# Patient Record
Sex: FEMALE | Race: BLACK OR AFRICAN AMERICAN | NOT HISPANIC OR LATINO | Employment: FULL TIME | URBAN - METROPOLITAN AREA
[De-identification: names, ages, dates, MRNs, and addresses within clinical notes are randomized per-mention and may not be internally consistent; named-entity substitution may affect disease eponyms.]

---

## 2017-05-23 ENCOUNTER — OFFICE VISIT (OUTPATIENT)
Dept: ORTHOPEDICS | Facility: CLINIC | Age: 62
End: 2017-05-23
Payer: COMMERCIAL

## 2017-05-23 ENCOUNTER — HOSPITAL ENCOUNTER (OUTPATIENT)
Dept: RADIOLOGY | Facility: HOSPITAL | Age: 62
Discharge: HOME | End: 2017-05-23
Attending: ORTHOPAEDIC SURGERY
Payer: COMMERCIAL

## 2017-05-23 ENCOUNTER — TELEPHONE (OUTPATIENT)
Dept: ORTHOPEDICS | Facility: CLINIC | Age: 62
End: 2017-05-23

## 2017-05-23 VITALS — HEIGHT: 66 IN | WEIGHT: 180.13 LBS | BODY MASS INDEX: 28.95 KG/M2

## 2017-05-23 DIAGNOSIS — G89.29 CHRONIC PAIN OF LEFT KNEE: Primary | ICD-10-CM

## 2017-05-23 DIAGNOSIS — M25.562 CHRONIC PAIN OF LEFT KNEE: ICD-10-CM

## 2017-05-23 DIAGNOSIS — G89.29 CHRONIC PAIN OF LEFT KNEE: ICD-10-CM

## 2017-05-23 DIAGNOSIS — M25.562 CHRONIC PAIN OF LEFT KNEE: Primary | ICD-10-CM

## 2017-05-23 DIAGNOSIS — M17.12 PRIMARY OSTEOARTHRITIS OF LEFT KNEE: ICD-10-CM

## 2017-05-23 PROCEDURE — 73560 X-RAY EXAM OF KNEE 1 OR 2: CPT | Mod: TC,RT

## 2017-05-23 PROCEDURE — 73562 X-RAY EXAM OF KNEE 3: CPT | Mod: 26,LT,, | Performed by: RADIOLOGY

## 2017-05-23 PROCEDURE — 99213 OFFICE O/P EST LOW 20 MIN: CPT | Mod: S$GLB,,, | Performed by: ORTHOPAEDIC SURGERY

## 2017-05-23 PROCEDURE — 1160F RVW MEDS BY RX/DR IN RCRD: CPT | Mod: S$GLB,,, | Performed by: ORTHOPAEDIC SURGERY

## 2017-05-23 PROCEDURE — 73560 X-RAY EXAM OF KNEE 1 OR 2: CPT | Mod: 26,RT,, | Performed by: RADIOLOGY

## 2017-05-23 PROCEDURE — 99999 PR PBB SHADOW E&M-NEW PATIENT-LVL II: CPT | Mod: PBBFAC,,, | Performed by: ORTHOPAEDIC SURGERY

## 2017-05-23 RX ORDER — AMOXICILLIN 500 MG
2 CAPSULE ORAL DAILY
COMMUNITY
End: 2023-01-09

## 2017-05-23 RX ORDER — ACETAMINOPHEN 500 MG
5000 TABLET ORAL DAILY
COMMUNITY
End: 2023-05-22

## 2017-05-23 NOTE — PROGRESS NOTES
"HPI:    Brittany Modi is a 61 y.o. female who is here today for   Chief Complaint   Patient presents with    Left Knee - Pain   .   Patient had a right total knee 12/21/15 and has done well with it.  Now starting to have left knee pain.  It's limiting her activity and she is using a cane.    Duration: 6 months  Intensity: severe  Character of pain: sharp  Location: She reports that the pain is predominately  medial    Past Medical History:   Diagnosis Date    Heart murmur           Current Outpatient Prescriptions:     cholecalciferol, vitamin D3, (VITAMIN D3) 5,000 unit Tab, Take 5,000 Units by mouth once daily., Disp: , Rfl:     fish oil-omega-3 fatty acids 300-1,000 mg capsule, Take 2 g by mouth once daily., Disp: , Rfl:     multivitamin capsule, Take 1 capsule by mouth once daily., Disp: , Rfl:      Review of patient's allergies indicates:  No Known Allergies     ROS  Constitutional: Negative for fever, Negative for weight loss  HENT Negative for congestion  Cardiovascular: Negative chest pain  Respiratory: Negative Shortness of breath  Heme: Negative excessive bleeding  Skin:NegativeItching, Negative breakdown  Musculoskeletal:Negative for back pain, Positive for joint pain, Negative muscle pain, Negative muscle weakness  Neurological: Negative for numbness and paresthesias   Psychiatric/Behavioral: Negative altered mental status, Negative for depression    Physical Exam:   Ht 5' 5.5" (1.664 m)   Wt 81.7 kg (180 lb 1.9 oz)   BMI 29.52 kg/m²   General appearance: This is a well-developed, Well nourished female No obvious acute distress.  Psychiatric: normal mood and affect;  pleasant and conversant; judgment and thought content normal  Gait is coordinated. Patient has antalgic gait to the left  Cardiovascular: There are no swelling or varicosities present.   Respiratory: normal respiratory effort   Lymphatic: no adenopathy   Neurologic: alert and oriented to person, place, and time   Deep Tendon Reflexes " are normal;  Coordination and Balance: Normal   Musculoskeletal   Neck    ROM shows normal flexion and extension and lateral rotation    Palpation: Non-tender    Stability is normal    Strength is normal    Skin is normal without masses and lesions    Sensation is intact to light touch   Back    ROM showsnormal flexion, extension    and  rotation    Palpation shows no masses    Stability is normal    Strength to flexion and extension well maintained    Core strength is diminished    Skin shows no rashes or cafe au lait spots;     Sensation is intact to light touch    Right Knee  Swelling None  TendernessNone  Range of Motion: 120    Left Knee: Swelling None  TendernessMedial joint line  Range of Motion: 120    Radiograph   Osteoarthritis: severe  Angle: mild varus    Assessment: The medial osteoarthritis is not changed drastically in the last year.    Plan:  At this point will follow it along no plans for intervention yet.

## 2017-12-26 ENCOUNTER — OFFICE VISIT (OUTPATIENT)
Dept: CARDIOLOGY | Facility: CLINIC | Age: 62
End: 2017-12-26
Payer: COMMERCIAL

## 2017-12-26 VITALS
HEIGHT: 65 IN | BODY MASS INDEX: 29.97 KG/M2 | DIASTOLIC BLOOD PRESSURE: 70 MMHG | WEIGHT: 179.88 LBS | SYSTOLIC BLOOD PRESSURE: 110 MMHG | HEART RATE: 84 BPM

## 2017-12-26 DIAGNOSIS — R00.2 PALPITATION: ICD-10-CM

## 2017-12-26 DIAGNOSIS — R07.2 PRECORDIAL PAIN: Primary | ICD-10-CM

## 2017-12-26 PROCEDURE — 93000 ELECTROCARDIOGRAM COMPLETE: CPT | Mod: S$GLB,,, | Performed by: INTERNAL MEDICINE

## 2017-12-26 PROCEDURE — 99214 OFFICE O/P EST MOD 30 MIN: CPT | Mod: S$GLB,,, | Performed by: INTERNAL MEDICINE

## 2017-12-26 PROCEDURE — 99999 PR PBB SHADOW E&M-EST. PATIENT-LVL III: CPT | Mod: PBBFAC,,, | Performed by: INTERNAL MEDICINE

## 2017-12-26 RX ORDER — NITROGLYCERIN 0.4 MG/1
0.4 TABLET SUBLINGUAL EVERY 5 MIN PRN
Qty: 60 TABLET | Refills: 1 | Status: SHIPPED | OUTPATIENT
Start: 2017-12-26 | End: 2018-01-23 | Stop reason: SDUPTHER

## 2017-12-26 NOTE — PROGRESS NOTES
Subjective:    Patient ID:  Brittany Modi is a 62 y.o. female who presents for evaluation of Chest Pain      HPI   The patient is a 62 year old female presents for evaluation of chest pain with an onset [id number]. The pain is discribed as pressure which can be severe which can be brought on with extreme exercise and elevated heart rate but occurs at resting a few minutes. She was evaluated in [facility] in 2015. The echo stress test revealed abnormal ST response but no wall motion abnormalities. A nuclear study was normal followed but a LHC which was normal. See media. She was prescribed amlodipine which was of no help. She was never give a trial of TNG. An EKG and Echo here in 2015 were normal. He is a non smoker, has no hypertension, diabetes of family history of CAD. Today's EKG reveals non specific T wave abnormalities. 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.  Lab Results   Component Value Date     12/22/2015    K 3.8 12/22/2015     12/22/2015    CO2 23 12/22/2015    BUN 13 12/22/2015    CREATININE 0.9 12/22/2015     (H) 12/22/2015    MG 1.9 12/22/2015    AST 30 12/15/2015    ALT 18 12/15/2015    ALBUMIN 4.2 12/15/2015    PROT 7.6 12/15/2015    BILITOT 0.4 12/15/2015    WBC 5.46 12/21/2015    HGB 10.7 (L) 12/22/2015    HCT 33.2 (L) 12/22/2015    MCV 98 12/21/2015     12/21/2015    INR 1.1 12/15/2015         No results found for: CHOL, HDL, TRIG    No results found for: LDLCALC    Past Medical History:   Diagnosis Date    Arthritis     GERD (gastroesophageal reflux disease)     Heart murmur        Current Outpatient Prescriptions:     cholecalciferol, vitamin D3, (VITAMIN D3) 5,000 unit Tab, Take 5,000 Units by mouth once daily., Disp: , Rfl:     fish oil-omega-3 fatty acids 300-1,000 mg capsule, Take 2 g by mouth once daily., Disp: , Rfl:     multivitamin capsule, Take 1 capsule by mouth once daily., Disp: , Rfl:     amlodipine (NORVASC) 5 MG tablet, Take 5 mg by  mouth every evening., Disp: , Rfl:     aspirin 325 MG tablet, Take 1 tablet (325 mg total) by mouth 2 (two) times daily., Disp: 60 tablet, Rfl: 0    hydrocodone-acetaminophen 5-325mg (NORCO) 5-325 mg per tablet, Take 1 tablet by mouth every 4 to 6 hours as needed for Pain., Disp: 90 tablet, Rfl: 0    metoprolol succinate (TOPROL-XL) 50 MG 24 hr tablet, Take 50 mg by mouth every evening., Disp: , Rfl:     ondansetron (ZOFRAN-ODT) 4 MG TbDL, Take 1 tablet (4 mg total) by mouth every 6 (six) hours as needed (nausea)., Disp: 60 tablet, Rfl: 0    tramadol (ULTRAM) 50 mg tablet, One or two tablets every 4 hours, prn pain, Disp: 60 tablet, Rfl: 0        Review of Systems   Constitution: Negative for decreased appetite, diaphoresis, fever, weakness, malaise/fatigue, weight gain and weight loss.   HENT: Negative for congestion, ear discharge, ear pain and nosebleeds.    Eyes: Negative for blurred vision, double vision and visual disturbance.   Cardiovascular: Positive for chest pain. Negative for claudication, cyanosis, dyspnea on exertion, irregular heartbeat, leg swelling, near-syncope, orthopnea, palpitations, paroxysmal nocturnal dyspnea and syncope.   Respiratory: Negative for cough, hemoptysis, shortness of breath, sleep disturbances due to breathing, snoring, sputum production and wheezing.    Endocrine: Negative for polydipsia, polyphagia and polyuria.   Hematologic/Lymphatic: Negative for adenopathy and bleeding problem. Does not bruise/bleed easily.   Skin: Negative for color change, nail changes, poor wound healing and rash.   Musculoskeletal: Negative for muscle cramps and muscle weakness.   Gastrointestinal: Negative for abdominal pain, anorexia, change in bowel habit, hematochezia, nausea and vomiting.   Genitourinary: Negative for dysuria, frequency and hematuria.   Neurological: Negative for brief paralysis, difficulty with concentration, excessive daytime sleepiness, dizziness, focal weakness,  "headaches, light-headedness, seizures and vertigo.   Psychiatric/Behavioral: Negative for altered mental status and depression.   Allergic/Immunologic: Negative for persistent infections.        Objective:/70   Pulse 84   Ht 5' 5" (1.651 m)   Wt 81.6 kg (179 lb 14.3 oz)   BMI 29.94 kg/m²           Physical Exam   Constitutional: She is oriented to person, place, and time. She appears well-developed and well-nourished.   HENT:   Head: Normocephalic.   Right Ear: External ear normal.   Left Ear: External ear normal.   Nose: Nose normal.   Inspection of lips, teeth and gums normal   Eyes: Conjunctivae and EOM are normal. Pupils are equal, round, and reactive to light. No scleral icterus.   Neck: Normal range of motion. No JVD present. No tracheal deviation present. No thyromegaly present.   Cardiovascular: Normal rate, regular rhythm, normal heart sounds and intact distal pulses.  Exam reveals no gallop and no friction rub.    No murmur heard.  Pulses:       Carotid pulses are 2+ on the right side, and 2+ on the left side.       Dorsalis pedis pulses are 2+ on the right side, and 2+ on the left side.        Posterior tibial pulses are 2+ on the right side, and 2+ on the left side.   Pulmonary/Chest: Effort normal and breath sounds normal. No respiratory distress. She has no wheezes. She has no rales. She exhibits no tenderness.   Abdominal: Soft. Bowel sounds are normal. She exhibits no distension. There is no hepatosplenomegaly. There is no tenderness. There is no guarding.   Musculoskeletal: Normal range of motion. She exhibits no edema or tenderness.   Lymphadenopathy:   Palpation of lymph nodes of neck and groin normal   Neurological: She is oriented to person, place, and time. No cranial nerve deficit. She exhibits normal muscle tone. Coordination normal.   Skin: Skin is warm and dry. No rash noted. No erythema. No pallor.   Palpation of skin normal   Psychiatric: She has a normal mood and affect. Her " behavior is normal. Judgment and thought content normal.         Assessment:       1. Precordial pain atypical        Plan:       Brittany was seen today for chest pain.    Diagnoses and all orders for this visit:    Precordial pain    PET ordered to evaluate for small vessel diease  Trial of TNG

## 2017-12-28 ENCOUNTER — TELEPHONE (OUTPATIENT)
Dept: CARDIOLOGY | Facility: CLINIC | Age: 62
End: 2017-12-28

## 2017-12-28 DIAGNOSIS — R00.2 PALPITATION: Primary | ICD-10-CM

## 2017-12-28 NOTE — TELEPHONE ENCOUNTER
----- Message from Geri Mckenna RN sent at 12/28/2017  1:20 PM CST -----  Regarding: FW: EKG ORDER      ----- Message -----  From: Chanel Arechiga  Sent: 12/28/2017  11:16 AM  To: [facility] Clinical Support Staff  Subject: EKG ORDER                                        Please put in EKG order, thanks. Chanel [phone]

## 2018-01-15 ENCOUNTER — PATIENT MESSAGE (OUTPATIENT)
Dept: CARDIOLOGY | Facility: CLINIC | Age: 63
End: 2018-01-15

## 2018-01-15 ENCOUNTER — TELEPHONE (OUTPATIENT)
Dept: CARDIOLOGY | Facility: CLINIC | Age: 63
End: 2018-01-15

## 2018-01-15 DIAGNOSIS — I25.10 CORONARY ARTERY DISEASE, ANGINA PRESENCE UNSPECIFIED, UNSPECIFIED VESSEL OR LESION TYPE, UNSPECIFIED WHETHER NATIVE OR TRANSPLANTED HEART: Primary | ICD-10-CM

## 2018-01-23 ENCOUNTER — PATIENT MESSAGE (OUTPATIENT)
Dept: CARDIOLOGY | Facility: CLINIC | Age: 63
End: 2018-01-23

## 2018-01-23 DIAGNOSIS — R07.2 PRECORDIAL PAIN: ICD-10-CM

## 2018-01-23 RX ORDER — NITROGLYCERIN 0.4 MG/1
0.4 TABLET SUBLINGUAL EVERY 5 MIN PRN
Qty: 25 TABLET | Refills: 3 | Status: SHIPPED | OUTPATIENT
Start: 2018-01-23 | End: 2019-07-17

## 2018-01-29 ENCOUNTER — LAB VISIT (OUTPATIENT)
Dept: LAB | Facility: HOSPITAL | Age: 63
End: 2018-01-29
Attending: OBSTETRICS & GYNECOLOGY
Payer: COMMERCIAL

## 2018-01-29 ENCOUNTER — TELEPHONE (OUTPATIENT)
Dept: OBSTETRICS AND GYNECOLOGY | Facility: CLINIC | Age: 63
End: 2018-01-29

## 2018-01-29 ENCOUNTER — OFFICE VISIT (OUTPATIENT)
Dept: OBSTETRICS AND GYNECOLOGY | Facility: CLINIC | Age: 63
End: 2018-01-29
Payer: COMMERCIAL

## 2018-01-29 VITALS
HEIGHT: 65 IN | SYSTOLIC BLOOD PRESSURE: 120 MMHG | WEIGHT: 179.88 LBS | BODY MASS INDEX: 29.97 KG/M2 | DIASTOLIC BLOOD PRESSURE: 80 MMHG

## 2018-01-29 DIAGNOSIS — Z01.419 WELL WOMAN EXAM WITH ROUTINE GYNECOLOGICAL EXAM: Primary | ICD-10-CM

## 2018-01-29 DIAGNOSIS — Z01.419 WELL WOMAN EXAM WITH ROUTINE GYNECOLOGICAL EXAM: ICD-10-CM

## 2018-01-29 DIAGNOSIS — Z78.0 MENOPAUSE: ICD-10-CM

## 2018-01-29 LAB
CHOLEST SERPL-MCNC: 168 MG/DL
CHOLEST/HDLC SERPL: 3.1 {RATIO}
ESTIMATED AVG GLUCOSE: 91 MG/DL
HBA1C MFR BLD HPLC: 4.8 %
HDLC SERPL-MCNC: 54 MG/DL
HDLC SERPL: 32.1 %
LDLC SERPL CALC-MCNC: 103.4 MG/DL
NONHDLC SERPL-MCNC: 114 MG/DL
TRIGL SERPL-MCNC: 53 MG/DL
TSH SERPL DL<=0.005 MIU/L-ACNC: 1.53 UIU/ML

## 2018-01-29 PROCEDURE — 83036 HEMOGLOBIN GLYCOSYLATED A1C: CPT

## 2018-01-29 PROCEDURE — 99386 PREV VISIT NEW AGE 40-64: CPT | Mod: S$GLB,,, | Performed by: OBSTETRICS & GYNECOLOGY

## 2018-01-29 PROCEDURE — 36415 COLL VENOUS BLD VENIPUNCTURE: CPT

## 2018-01-29 PROCEDURE — 99999 PR PBB SHADOW E&M-EST. PATIENT-LVL III: CPT | Mod: PBBFAC,,, | Performed by: OBSTETRICS & GYNECOLOGY

## 2018-01-29 PROCEDURE — 84443 ASSAY THYROID STIM HORMONE: CPT

## 2018-01-29 PROCEDURE — 80061 LIPID PANEL: CPT

## 2018-01-29 RX ORDER — ESTRADIOL 0.03 MG/D
1 PATCH TRANSDERMAL
Qty: 4 PATCH | Refills: 11 | Status: SHIPPED | OUTPATIENT
Start: 2018-01-29 | End: 2018-02-02 | Stop reason: ALTCHOICE

## 2018-01-29 RX ORDER — MEDROXYPROGESTERONE ACETATE 5 MG/1
5 TABLET ORAL DAILY
Qty: 30 TABLET | Refills: 11 | Status: SHIPPED | OUTPATIENT
Start: 2018-01-29 | End: 2018-05-16

## 2018-01-29 NOTE — TELEPHONE ENCOUNTER
----- Message from Janki Lopes MA sent at 1/29/2018 10:37 AM CST -----  Pt needs a hormone consults with Dr. Azar. Please call and set up with her.   Thank you

## 2018-01-29 NOTE — PROGRESS NOTES
History & Physical  Gynecology      SUBJECTIVE:     Chief Complaint: Well Woman (menopause, having chest pains(everyday), was evaulated and normal)       History of Present Illness:  Annual Exam-Postmenopausal  Patient presents for annual exam.  Patient denies post-menopausal vaginal bleeding. Patient is complaining of menopausal symptoms- weight gain, hot flashes.  She was getting hormone pellets in [place]- last two years ago.  Patient also complaining of urinary frequency.  The patient is sexually active. The patient is not currently taking hormone replacement therapy.  The patient participates in regular exercise: yes.  She does not smoke.     GYN screening history: last pap: approximate date  and was normal  Mammogram history: 2016, normal  Colonoscopy history: >10 years ago, normal  Dexa history: 8-10 years ago, normal    FH:   Breast cancer: sister  Colon cancer: none  Ovarian cancer: none    Review of patient's allergies indicates:  No Known Allergies    Past Medical History:   Diagnosis Date    Arthritis     GERD (gastroesophageal reflux disease)     Heart murmur      Past Surgical History:   Procedure Laterality Date    abdominal surgery      abdominoplasty    APPENDECTOMY          COLONOSCOPY      KNEE SURGERY Right 12-21-15    TKR    KNEE SURGERY Right 2015    TKR    TUBAL LIGATION      age 30's     OB History      Para Term  AB Living    2 2 2     2    SAB TAB Ectopic Multiple Live Births            2        Family History   Problem Relation Age of Onset    Diabetes Mother     Hypertension Mother     Hypothyroidism Mother     Asthma Father     Hypertension Sister     Cancer Sister      breast    Breast cancer Sister     Hypertension Brother     Hypertension Sister     Hypertension Sister     Hypertension Brother     Hypertension Son     Colon cancer Neg Hx     Ovarian cancer Neg Hx      Social History   Substance Use Topics    Smoking status: Never Smoker     Smokeless tobacco: Never Used      Comment: RN at [employer]    Alcohol use No       Current Outpatient Prescriptions   Medication Sig    cholecalciferol, vitamin D3, (VITAMIN D3) 5,000 unit Tab Take 5,000 Units by mouth once daily.    fish oil-omega-3 fatty acids 300-1,000 mg capsule Take 2 g by mouth once daily.    multivitamin capsule Take 1 capsule by mouth once daily.    nitroGLYCERIN (NITROSTAT) 0.4 MG SL tablet Place 1 tablet (0.4 mg total) under the tongue every 5 (five) minutes as needed for Chest pain.     No current facility-administered medications for this visit.        Review of Systems:  Review of Systems   Constitutional: Negative for activity change, appetite change, fever and unexpected weight change (increase in weight since menopause).   Respiratory: Negative for shortness of breath.    Cardiovascular: Negative for chest pain.   Gastrointestinal: Negative for abdominal pain, constipation, diarrhea, nausea and vomiting.   Endocrine: Positive for hot flashes.   Genitourinary: Positive for frequency. Negative for menorrhagia, menstrual problem, pelvic pain, urgency, vaginal bleeding, vaginal discharge, vaginal pain and vaginal odor.   Neurological: Negative for numbness and headaches.   Breast: Negative for breast pain and nipple discharge       OBJECTIVE:     Physical Exam:  Physical Exam   Constitutional: She is oriented to person, place, and time. She appears well-developed and well-nourished.   Neck: Normal range of motion. Neck supple. No tracheal deviation present. No thyromegaly present.   Cardiovascular: Normal rate, regular rhythm and normal heart sounds.    Pulmonary/Chest: Effort normal and breath sounds normal. Right breast exhibits no inverted nipple, no mass, no nipple discharge, no skin change and no tenderness. Left breast exhibits no inverted nipple, no mass, no nipple discharge, no skin change and no tenderness. Breasts are symmetrical.   Abdominal: Soft.   Genitourinary: Vagina  normal and uterus normal. No labial fusion. There is no rash, tenderness, lesion or injury on the right labia. There is no rash, tenderness, lesion or injury on the left labia. Uterus is not deviated, not enlarged, not fixed and not tender. Cervix exhibits no motion tenderness, no discharge and no friability. Right adnexum displays no mass, no tenderness and no fullness. Left adnexum displays no mass, no tenderness and no fullness. No erythema, tenderness or bleeding in the vagina. No foreign body in the vagina. No signs of injury around the vagina. No vaginal discharge found.   Neurological: She is alert and oriented to person, place, and time.   Psychiatric: She has a normal mood and affect. Her behavior is normal. Judgment and thought content normal.   Nursing note and vitals reviewed.        ASSESSMENT:       ICD-10-CM ICD-9-CM    1. Well woman exam with routine gynecological exam Z01.419 V72.31 Mammo Digital Screening Bilat with CAD      Urine culture      TSH      LIPID PANEL      Hemoglobin A1c      Urine culture   2. Menopause Z78.0 627.2           Plan:      Brittany was seen today for well woman.    Diagnoses and all orders for this visit:    Well woman exam with routine gynecological exam  -     Mammo Digital Screening Bilat with CAD; Future  -     Urine culture  -     TSH; Future  -     LIPID PANEL; Future  -     Hemoglobin A1c; Future  -     Urine culture; Future    Menopause        Orders Placed This Encounter   Procedures    Urine culture    Urine culture    Mammo Digital Screening Bilat with CAD    TSH    LIPID PANEL    Hemoglobin A1c       Well Woman:   - Pap smear: up to date per patient  - Mammogram: ordered for today  - Colonoscopy: needs to be done, patient does not want to schedule today  - Dexa: has had, normal  - Immunizations: n/a  - Labs: a1c, lipids, TSH  - Exercise counseling provided    Menopause:  - patient previously on pellets- last used in 2015; discussed Dr. Doe faith  and she is interested in making an appointment  - patient does desire treatment again; discussed using hormones recommendations by ACOG- smallest dose for the shortest amount of time.  Discussed the risks with hormones including slight increased risk for breast cancer, cardiovascular disease, stroke, blood clots; patient recognizes the risk and would like to start back on hormones again  - will prescribe     Follow up in one year for annual, or prn.    Bethany Denney

## 2018-01-30 ENCOUNTER — HOSPITAL ENCOUNTER (OUTPATIENT)
Dept: RADIOLOGY | Facility: HOSPITAL | Age: 63
Discharge: HOME | End: 2018-01-30
Attending: OBSTETRICS & GYNECOLOGY
Payer: COMMERCIAL

## 2018-01-30 DIAGNOSIS — Z01.419 WELL WOMAN EXAM WITH ROUTINE GYNECOLOGICAL EXAM: ICD-10-CM

## 2018-01-30 DIAGNOSIS — Z12.39 BREAST CANCER SCREENING: ICD-10-CM

## 2018-01-30 PROCEDURE — 77063 BREAST TOMOSYNTHESIS BI: CPT | Mod: 26,,, | Performed by: RADIOLOGY

## 2018-01-30 PROCEDURE — 77067 SCR MAMMO BI INCL CAD: CPT | Mod: TC,PO

## 2018-01-30 PROCEDURE — 77067 SCR MAMMO BI INCL CAD: CPT | Mod: 26,,, | Performed by: RADIOLOGY

## 2018-02-02 ENCOUNTER — OFFICE VISIT (OUTPATIENT)
Dept: OBSTETRICS AND GYNECOLOGY | Facility: CLINIC | Age: 63
End: 2018-02-02
Attending: OBSTETRICS & GYNECOLOGY
Payer: COMMERCIAL

## 2018-02-02 VITALS
WEIGHT: 182.31 LBS | DIASTOLIC BLOOD PRESSURE: 74 MMHG | SYSTOLIC BLOOD PRESSURE: 124 MMHG | BODY MASS INDEX: 30.37 KG/M2 | HEIGHT: 65 IN

## 2018-02-02 DIAGNOSIS — Z78.0 MENOPAUSE: Primary | ICD-10-CM

## 2018-02-02 PROCEDURE — 99214 OFFICE O/P EST MOD 30 MIN: CPT | Mod: 25,S$GLB,, | Performed by: OBSTETRICS & GYNECOLOGY

## 2018-02-02 PROCEDURE — 3008F BODY MASS INDEX DOCD: CPT | Mod: S$GLB,,, | Performed by: OBSTETRICS & GYNECOLOGY

## 2018-02-02 PROCEDURE — 96372 THER/PROPH/DIAG INJ SC/IM: CPT | Mod: S$GLB,,, | Performed by: OBSTETRICS & GYNECOLOGY

## 2018-02-02 RX ORDER — TESTOSTERONE CYPIONATE 200 MG/ML
50 INJECTION, SOLUTION INTRAMUSCULAR ONCE
Status: COMPLETED | OUTPATIENT
Start: 2018-02-02 | End: 2018-02-02

## 2018-02-02 RX ADMIN — TESTOSTERONE CYPIONATE 50 MG: 200 INJECTION, SOLUTION INTRAMUSCULAR at 03:02

## 2018-02-02 NOTE — PROGRESS NOTES
Subjective:       Patient ID: Brittany Modi is a 62 y.o. female.    Chief Complaint:  Menopause      History of Present Illness  HPI  This 62 yr old P2 female is here for discussion of HRT specifically she desires to get pellets.  She was receiving pellets for yrs in [facility] from Dr Isauro Mina and states she never took a progesterone pill as was in her pellets.  She never had bleeding either.  She just saw Dr Denney for [employer] and is up to date.  No family of clotting and one sister with breast ca.  We discussed risks and benefits and she is very up to date on the data.  We also discussed injection today for dosing purposes.  She last had pellets 4 yrs ago and on nothing since then.  She is having classic symptoms of hot flashes, night sweats, vaginal dryness, fatigue and weight gain. And urinary frequency  We spent a significant amt of time discussing estrogen replacement theropy.  We discussed the risks and benefits including breast cancer and embolic risk, osteoporosis and heart and colon health benefits.  Pt understands that there are many different ways to take estrogen and many different doses and that she does not have to take long term unless she chooses.  She understands that if she still has her uterus, she will need to take progesterone with this to decrease risk of endometrial cancer.We discussed side effects that might include but not limited to headaches, edema, nauseasness,   The patient and I have discussed testosterone therapy and its risks and benefits.  The risks include increased increasing cholesterol levels, facial hair and other hair growth, acne, increased sized of clitoris, deepening of voice, anxiety as well as other side effects.  Benefits include increased energy level, increased libido, easier orgasm and potiential increased muscle mass.  Pt understands that different women have different amounts of risks and benefits to this med and that she can stop the medication at any  time.   GYN & OB History  No LMP recorded. Patient is postmenopausal.   Date of Last Pap: No result found    OB History    Para Term  AB Living   2 2 2     2   SAB TAB Ectopic Multiple Live Births           2      # Outcome Date GA Lbr Alejandro/2nd Weight Sex Delivery Anes PTL Lv   2 Term     M Vag-Spont   JOB   1 Term     F Vag-Spont   JOB          Review of Systems  Review of Systems   Constitutional: Positive for fatigue and unexpected weight change. Negative for chills and fever.   Respiratory: Negative for shortness of breath.    Cardiovascular: Negative for chest pain.   Gastrointestinal: Negative for abdominal pain, nausea and vomiting.   Endocrine: Positive for heat intolerance.   Genitourinary: Positive for frequency. Negative for difficulty urinating, dyspareunia, genital sores, menstrual problem, pelvic pain, vaginal bleeding, vaginal discharge and vaginal pain.   Skin: Negative for wound.   Hematological: Negative for adenopathy.   Psychiatric/Behavioral: Positive for sleep disturbance.           Objective:    Physical Exam       Assessment:        1. Menopause               Plan:      Will give her IM E/T 5/50 today and dose her pellets accordingly.  We spent over 25 min and all in counseling

## 2018-02-02 NOTE — PROGRESS NOTES
Brittany ADITI Modi received hormone therapy injection in clinic per Dr. Azar, no complaints at this time , Injection given as ordered, tolerated well, no report of pain prior to or after injection. Return to clinic as scheduled.     Site - LB    Testosterone   50   mg  Depo Estradiol       5  mg    Clinic Supplied Medication

## 2018-02-23 ENCOUNTER — TELEPHONE (OUTPATIENT)
Dept: OBSTETRICS AND GYNECOLOGY | Facility: CLINIC | Age: 63
End: 2018-02-23

## 2018-02-23 NOTE — TELEPHONE ENCOUNTER
----- Message from Geri Azar MD sent at 2/22/2018  2:31 PM CST -----  Contact: Pt  Please let her know this can be normal but if she starts heavy bleeding to let us know. Make sure she is taking her progesterone.  thanks  ----- Message -----  From: Padma Melo MA  Sent: 2/21/2018  10:29 AM  To: Geri Azar MD        ----- Message -----  From: Tello [place]  Sent: 2/21/2018   8:56 AM  To: Doe [facility] Staff    X_ 1st Request  _ 2nd Request  _ 3rd Request    Who: KERMIT PAULSON [[id number]]    Why: Patient would like to speak with staff in regards to heavy spotting since injection. Please advise    What Number to Call Back: [phone]    When to Expect a call back: (Before the end of the day)  -- if call after 3:00 call back will be tomorrow.

## 2018-03-02 ENCOUNTER — TELEPHONE (OUTPATIENT)
Dept: OBSTETRICS AND GYNECOLOGY | Facility: CLINIC | Age: 63
End: 2018-03-02

## 2018-03-02 NOTE — TELEPHONE ENCOUNTER
----- Message from Staci Neely sent at 3/2/2018 11:31 AM CST -----  Contact: pt  x_ 1st Request  _ 2nd Request  _ 3rd Request    Who: pt    Why: in regards to reporting her vaginal bleeding after taking the RX    What Number to Call Back: [phone]    When to Expect a call back: (Before the end of the day)  -- if call after 3:00 call back will be tomorrow.

## 2018-03-02 NOTE — TELEPHONE ENCOUNTER
----- Message from Bry Magana sent at 3/2/2018  1:24 PM CST -----  Contact: Pt  x 1st Request  _  2nd Request  _  3rd Request        Who: KERMIT PAULSON [[id number]]    Why: Returning a call back from your office. Please return the call at earliest convenience.   Thanks!    What Number to Call Back:[phone] (home)       When to Expect a call back: (With in 24 hours)

## 2018-05-16 ENCOUNTER — OFFICE VISIT (OUTPATIENT)
Dept: INTERNAL MEDICINE | Facility: CLINIC | Age: 63
End: 2018-05-16
Payer: COMMERCIAL

## 2018-05-16 ENCOUNTER — LAB VISIT (OUTPATIENT)
Dept: LAB | Facility: HOSPITAL | Age: 63
End: 2018-05-16
Attending: INTERNAL MEDICINE
Payer: COMMERCIAL

## 2018-05-16 ENCOUNTER — PATIENT MESSAGE (OUTPATIENT)
Dept: INTERNAL MEDICINE | Facility: CLINIC | Age: 63
End: 2018-05-16

## 2018-05-16 ENCOUNTER — PATIENT MESSAGE (OUTPATIENT)
Dept: ADMINISTRATIVE | Facility: OTHER | Age: 63
End: 2018-05-16

## 2018-05-16 ENCOUNTER — TELEPHONE (OUTPATIENT)
Dept: INTERNAL MEDICINE | Facility: CLINIC | Age: 63
End: 2018-05-16

## 2018-05-16 VITALS
DIASTOLIC BLOOD PRESSURE: 74 MMHG | BODY MASS INDEX: 30.04 KG/M2 | RESPIRATION RATE: 16 BRPM | SYSTOLIC BLOOD PRESSURE: 128 MMHG | TEMPERATURE: 99 F | WEIGHT: 180.31 LBS | HEART RATE: 71 BPM | HEIGHT: 65 IN

## 2018-05-16 DIAGNOSIS — E04.9 PALPABLE THYROID: ICD-10-CM

## 2018-05-16 DIAGNOSIS — R35.89 POLYURIA: ICD-10-CM

## 2018-05-16 DIAGNOSIS — R35.89 POLYURIA: Primary | ICD-10-CM

## 2018-05-16 DIAGNOSIS — R63.1 POLYDIPSIA: ICD-10-CM

## 2018-05-16 DIAGNOSIS — Z78.0 MENOPAUSE: Primary | ICD-10-CM

## 2018-05-16 DIAGNOSIS — R07.9 INTERMITTENT CHEST PAIN: ICD-10-CM

## 2018-05-16 DIAGNOSIS — E66.9 OBESITY (BMI 30.0-34.9): ICD-10-CM

## 2018-05-16 LAB
ALBUMIN SERPL BCP-MCNC: 4.3 G/DL
ALP SERPL-CCNC: 77 U/L
ALT SERPL W/O P-5'-P-CCNC: 18 U/L
ANION GAP SERPL CALC-SCNC: 6 MMOL/L
AST SERPL-CCNC: 28 U/L
BASOPHILS # BLD AUTO: 0.04 K/UL
BASOPHILS NFR BLD: 1 %
BILIRUB SERPL-MCNC: 0.6 MG/DL
BUN SERPL-MCNC: 14 MG/DL
CALCIUM SERPL-MCNC: 10.1 MG/DL
CHLORIDE SERPL-SCNC: 105 MMOL/L
CO2 SERPL-SCNC: 32 MMOL/L
CREAT SERPL-MCNC: 0.9 MG/DL
DIFFERENTIAL METHOD: ABNORMAL
EOSINOPHIL # BLD AUTO: 0 K/UL
EOSINOPHIL NFR BLD: 1 %
ERYTHROCYTE [DISTWIDTH] IN BLOOD BY AUTOMATED COUNT: 13.1 %
EST. GFR  (AFRICAN AMERICAN): >60 ML/MIN/1.73 M^2
EST. GFR  (NON AFRICAN AMERICAN): >60 ML/MIN/1.73 M^2
ESTIMATED AVG GLUCOSE: 97 MG/DL
GLUCOSE SERPL-MCNC: 93 MG/DL
HBA1C MFR BLD HPLC: 5 %
HCT VFR BLD AUTO: 42.2 %
HGB BLD-MCNC: 13.5 G/DL
IMM GRANULOCYTES # BLD AUTO: 0 K/UL
IMM GRANULOCYTES NFR BLD AUTO: 0 %
LYMPHOCYTES # BLD AUTO: 2 K/UL
LYMPHOCYTES NFR BLD: 47.7 %
MCH RBC QN AUTO: 31.5 PG
MCHC RBC AUTO-ENTMCNC: 32 G/DL
MCV RBC AUTO: 99 FL
MONOCYTES # BLD AUTO: 0.4 K/UL
MONOCYTES NFR BLD: 10.2 %
NEUTROPHILS # BLD AUTO: 1.7 K/UL
NEUTROPHILS NFR BLD: 40.1 %
NRBC BLD-RTO: 0 /100 WBC
PLATELET # BLD AUTO: 282 K/UL
PMV BLD AUTO: 11.9 FL
POTASSIUM SERPL-SCNC: 4.3 MMOL/L
PROT SERPL-MCNC: 7.8 G/DL
RBC # BLD AUTO: 4.28 M/UL
SODIUM SERPL-SCNC: 143 MMOL/L
THYROGLOB AB SERPL IA-ACNC: <4 IU/ML
THYROPEROXIDASE IGG SERPL-ACNC: <6 IU/ML
TSH SERPL DL<=0.005 MIU/L-ACNC: 1.85 UIU/ML
WBC # BLD AUTO: 4.21 K/UL

## 2018-05-16 PROCEDURE — 84445 ASSAY OF TSI GLOBULIN: CPT

## 2018-05-16 PROCEDURE — 3008F BODY MASS INDEX DOCD: CPT | Mod: CPTII,S$GLB,, | Performed by: INTERNAL MEDICINE

## 2018-05-16 PROCEDURE — 86376 MICROSOMAL ANTIBODY EACH: CPT

## 2018-05-16 PROCEDURE — 83036 HEMOGLOBIN GLYCOSYLATED A1C: CPT

## 2018-05-16 PROCEDURE — 85025 COMPLETE CBC W/AUTO DIFF WBC: CPT

## 2018-05-16 PROCEDURE — 99999 PR PBB SHADOW E&M-EST. PATIENT-LVL IV: CPT | Mod: PBBFAC,,, | Performed by: INTERNAL MEDICINE

## 2018-05-16 PROCEDURE — 86800 THYROGLOBULIN ANTIBODY: CPT

## 2018-05-16 PROCEDURE — 84443 ASSAY THYROID STIM HORMONE: CPT

## 2018-05-16 PROCEDURE — 99214 OFFICE O/P EST MOD 30 MIN: CPT | Mod: S$GLB,,, | Performed by: INTERNAL MEDICINE

## 2018-05-16 PROCEDURE — 36415 COLL VENOUS BLD VENIPUNCTURE: CPT | Mod: PO

## 2018-05-16 PROCEDURE — 80053 COMPREHEN METABOLIC PANEL: CPT

## 2018-05-16 NOTE — PROGRESS NOTES
Subjective:       Patient ID: Brittany Modi is a 62 y.o. female who presents for frequent urination and thirsty  intermittent chest pain- declined PET scan       61yo F who presents with increase in urination and increase in thirst. She has a family history of diabetes. Her recent HbA1c was normal.    Previously on HRT using hormone pellets when she lived in [place], was evaluated here by . She requested hormonal testing but would need evaluation by OB/GYN    Urinary Frequency    This is a new problem. The current episode started 1 to 4 weeks ago. The problem has been waxing and waning. The pain is at a severity of 0/10. The patient is experiencing no pain. There has been no fever. Associated symptoms include frequency. Pertinent negatives include no hematuria, nausea, urgency, vomiting, constipation or rash. There is no history of recurrent UTIs.        Review of Systems   Constitutional: Positive for unexpected weight change. Negative for activity change, diaphoresis and fever.   HENT: Negative for congestion, hearing loss, rhinorrhea, sinus pressure and trouble swallowing.    Eyes: Positive for visual disturbance. Negative for discharge and redness.   Respiratory: Negative for cough, chest tightness, shortness of breath and wheezing.    Cardiovascular: Positive for chest pain and palpitations. Negative for leg swelling.   Gastrointestinal: Negative for blood in stool, constipation, diarrhea, nausea and vomiting.   Endocrine: Positive for cold intolerance, polydipsia and polyuria. Negative for heat intolerance and polyphagia.   Genitourinary: Positive for frequency. Negative for difficulty urinating, dysuria, hematuria, menstrual problem and urgency.   Musculoskeletal: Positive for arthralgias. Negative for joint swelling and neck pain.   Skin: Negative for rash.   Neurological: Negative for dizziness, weakness, numbness and headaches.   Hematological: Negative for adenopathy.   Psychiatric/Behavioral:  Positive for dysphoric mood and sleep disturbance. Negative for confusion.       Answers for HPI/ROS submitted by the patient on 5/15/2018   activity change: No  unexpected weight change: Yes  neck pain: No  hearing loss: No  rhinorrhea: No  trouble swallowing: No  eye discharge: No  visual disturbance: Yes  chest tightness: No  wheezing: No  chest pain: Yes  palpitations: Yes  blood in stool: No  constipation: No  vomiting: No  diarrhea: No  polydipsia: Yes  polyuria: Yes  difficulty urinating: No  hematuria: No  menstrual problem: No  dysuria: No  joint swelling: No  arthralgias: Yes  headaches: No  weakness: No  confusion: No  dysphoric mood: Yes      Objective:      Physical Exam   Constitutional: She is oriented to person, place, and time. Vital signs are normal. She appears well-developed and well-nourished. No distress.   HENT:   Head: Normocephalic and atraumatic.   Right Ear: Hearing and external ear normal.   Left Ear: Hearing and external ear normal.   Nose: Nose normal.   Mouth/Throat: Uvula is midline and mucous membranes are normal.   Eyes: Lids are normal.   Neck: Full passive range of motion without pain. No thyroid mass present. Thyromegaly: thyroid is palpable.   Cardiovascular: Normal rate, regular rhythm, normal heart sounds and intact distal pulses.    No murmur heard.  Pulmonary/Chest: Effort normal and breath sounds normal. She has no wheezes.   Abdominal: Soft. Bowel sounds are normal. She exhibits no distension. There is no tenderness.   Musculoskeletal: Normal range of motion. She exhibits no edema.   Neurological: She is alert and oriented to person, place, and time.   Skin: Skin is warm, dry and intact. No rash noted. She is not diaphoretic.   Psychiatric: She has a normal mood and affect.   Vitals reviewed.      Assessment:       1. Polyuria    2. Palpable thyroid    3. Polydipsia    4. Obesity (BMI 30.0-34.9)    5. Intermittent chest pain        Plan:         1. Polyuria  - recent HbA1c  in January was 4.8  - CBC auto differential; Future  - Comprehensive metabolic panel; Future  - Hemoglobin A1c; Future  - Urinalysis; Future  - Urine culture; Future    2. Palpable thyroid  - TSH; Future  - Anti-thyroglobulin antibody; Future  - Thyroid peroxidase antibody; Future  - Thyroid stimulating immunoglobulin; Future  - US Soft Tissue Head Neck Thyroid; Future    3. Polydipsia  - Comprehensive metabolic panel; Future  - Hemoglobin A1c; Future    4. Obesity (BMI 30.0-34.9)  - Ambulatory Referral to [facility] ([facility])    5. Intermittent chest pain  - pain evaluated by Cardiology  - planned cardiac PET but patient has delayed this, encouraged her to schedule    Sandra Conley MD

## 2018-05-16 NOTE — TELEPHONE ENCOUNTER
Spoke with lab, unable to add on labs patient is requesting due to correct tubes not drawn this am for these. Dr Conley notified.

## 2018-05-16 NOTE — TELEPHONE ENCOUNTER
Spoke with patient.    She is agreeable with f/u with gynecology for appropriate hormone testing.

## 2018-05-18 LAB — TSI SER-ACNC: <0.1 IU/L

## 2018-07-03 ENCOUNTER — OFFICE VISIT (OUTPATIENT)
Dept: URGENT CARE | Facility: CLINIC | Age: 63
End: 2018-07-03
Payer: COMMERCIAL

## 2018-07-03 VITALS
BODY MASS INDEX: 29.99 KG/M2 | TEMPERATURE: 98 F | OXYGEN SATURATION: 100 % | HEART RATE: 72 BPM | WEIGHT: 180 LBS | SYSTOLIC BLOOD PRESSURE: 141 MMHG | DIASTOLIC BLOOD PRESSURE: 86 MMHG | RESPIRATION RATE: 19 BRPM | HEIGHT: 65 IN

## 2018-07-03 DIAGNOSIS — M75.21 BICEPS TENDINITIS OF RIGHT UPPER EXTREMITY: Primary | ICD-10-CM

## 2018-07-03 PROCEDURE — 96372 THER/PROPH/DIAG INJ SC/IM: CPT | Mod: S$GLB,,, | Performed by: NURSE PRACTITIONER

## 2018-07-03 PROCEDURE — 99214 OFFICE O/P EST MOD 30 MIN: CPT | Mod: 25,S$GLB,, | Performed by: NURSE PRACTITIONER

## 2018-07-03 PROCEDURE — 3008F BODY MASS INDEX DOCD: CPT | Mod: CPTII,S$GLB,, | Performed by: NURSE PRACTITIONER

## 2018-07-03 RX ORDER — METHYLPREDNISOLONE 4 MG/1
TABLET ORAL
Qty: 21 TABLET | Refills: 0 | Status: SHIPPED | OUTPATIENT
Start: 2018-07-03 | End: 2019-07-17

## 2018-07-03 RX ORDER — KETOROLAC TROMETHAMINE 30 MG/ML
30 INJECTION, SOLUTION INTRAMUSCULAR; INTRAVENOUS
Status: COMPLETED | OUTPATIENT
Start: 2018-07-03 | End: 2018-07-03

## 2018-07-03 RX ADMIN — KETOROLAC TROMETHAMINE 30 MG: 30 INJECTION, SOLUTION INTRAMUSCULAR; INTRAVENOUS at 11:07

## 2018-07-03 NOTE — PATIENT INSTRUCTIONS
FOLLOW UP WITH PCP IF SYMPTOMS PERSIST FOR FURTHER EVAL AND TREATMENT    REST THE ARM    PAIN IS YOUR LIMITATION    Tendonitis  A tendon is the thick fibrous cord that joins muscle to bone and allows joints to move. When a tendon becomes inflamed, it is called tendonitis. This can occur from overuse, injury, or infection. This usually involves the shoulders, forearm, wrist, hands and foot. Symptoms include pain, swelling and tenderness to the touch. Moving the joint increases the pain.  It takes 4 to 6 weeks for tendonitis to heal. It is treated by preventing motion of the tendon with a splint or brace and the use of anti-inflammatory medicine.  Home care  · Some people find relief with ice packs. These can be crushed or cubed ice in a plastic bag or a bag of frozen vegetables wrapped in a thin towel. Other people get better relief with heat. This can include a hot shower, hot bath, or a moist towel warmed in a microwave. Try each and use the method that feels best, for 15 to 20 minutes several times a day.  · Rest the inflamed joint and protect it from movement.  · You may use over-the-counter ibuprofen or naproxen to treat pain and inflammation, unless another medicine was prescribed. If you can't take these medicines, acetaminophen may help with the pain, but does not treat inflammation. If you have chronic liver or kidney disease or ever had a stomach ulcer or gastrointestinal bleeding, talk with your doctor before using these medicines.  · As your symptoms improve, begin gradual motion at the involved joint.  Follow-up care  Follow up with your healthcare provider if you are not improving after 5 days of treatment.  When to seek medical advice  Call your healthcare provider right away if any of these occur:  · Redness over the painful area  · Increasing pain or swelling at the joint  · Fever (1 degree above your normal temperature) lasting 24 to 48 hours Or, whatever your healthcare provider told you to  report based on your condition  Date Last Reviewed: 11/21/2015  © 8086-9979 [employer]. [address] [place] [address], [place] [address]. All rights reserved. This information is not intended as a substitute for professional medical care. Always follow your healthcare professional's instructions.        Understanding Biceps Tendonitis    A tendon is a strong band of tissue that connects muscle to bone. The biceps muscle is in the front of the upper arm. It helps with movements such as bending the elbow or raising the arm. The upper end of the biceps muscle is called the proximal end. It has two tendons called the long head and the short head. These tendons attach the muscle to the bones in the shoulder. Biceps tendonitis occurs when either of these tendons is irritated or red and swollen (inflamed). Most cases involve the long head.  Causes of biceps tendonitis  Causes can include:  · Wear and tear of the tendon from aging or normal use over time  · Overuse of the tendon from sports or work activities, especially those that involve repeated overhead movements  · Injury to the tendon from a fall or other accident  · Other problems in the shoulder, such as shoulder impingement or a rotator cuff tear  Symptoms of biceps tendonitis  Common symptoms include:  · Pain in the front of the shoulder that may also travel down the arm. The pain may be worse with activity and at night.  · Swelling in the shoulder  · Clicking or catching sensation when using the arm and shoulder  · Trouble moving the arm and shoulder  Treating biceps tendonitis  Treatment for biceps tendonitis may include:  · Resting the arm and shoulder. This involves limiting certain movements, such as reaching above the head or raising the arm. These can slow healing and make symptoms worse. You may also need to limit certain sports and types of work for a time.  · Cold therapy. This involves using items such as ice packs to help relieve symptoms. Cold  can help reduce pain and swelling.  · Medicines. These help relieve pain and swelling. NSAIDs (nonsteroidal anti-inflammatory drugs) are the most common medicines used. Medicines may be prescribed or bought over-the-counter. They may be given as pills. Or they may be applied to the skin in the form of a gel, cream, or patch.  · Injections of medicine into the injured area. These help relieve pain and swelling for a time.  · Physical therapy and exercises.  These help improve strength and range of motion in the arm and shoulder.  Possible complications  · If the tendon isnt given time to heal, symptoms may worsen. Also, the tendon may tear (rupture).  · If the tendon ruptures or doesnt get better with treatment, your healthcare provider may recommend surgery. This most often involves repairing and reattaching the tendon.     When to call your healthcare provider  Call your healthcare provider right away if you have any of these:  · Fever of 100.4°F (38°C) or higher, or as directed  · Symptoms that dont get better with treatment, or get worse  · Weakness or instability in the arm or shoulder  · Sudden sharp pain, bruising, swelling, popping or snapping sensation, or bulge in the upper arm or shoulder  · New symptoms   Date Last Reviewed: 3/10/2016  © 9633-6751 [employer]. [address] [place] [address], [place] [address]. All rights reserved. This information is not intended as a substitute for professional medical care. Always follow your healthcare professional's instructions.

## 2018-07-03 NOTE — PROGRESS NOTES
"Subjective:       Patient ID: Brittany Modi is a 62 y.o. female.    Vitals:  height is 5' 5" (1.651 m) and weight is 81.6 kg (180 lb). Her oral temperature is 97.5 °F (36.4 °C). Her blood pressure is 141/86 (abnormal) and her pulse is 72. Her respiration is 19 and oxygen saturation is 100%.     Chief Complaint: Arm Pain (right)    Pt is here right upper arm pain that started 3 weeks ago. Pt denies any fall or injury. Pt says the day before the pain started she was lifting weights at the gym but experienced no pain at the gym. Pt says the pain is in her biceps radiates to her upper back/shoulder at times. Pt reports the pain is worse when she bends her elbow but pain also occurs at rest. Pt denies any actual elbow pain. Pt says bicep is not tender to touch. Pt denies any chest pain. Pt is requesting a sling. 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 Pt has been taking ibuprofen for 3 weeks.       Arm Pain    The incident occurred more than 1 week ago (3 weeks). The incident occurred at home. There was no injury mechanism. The pain is present in the right shoulder and upper right arm. The quality of the pain is described as stabbing and burning. The pain radiates to the right arm and back. The pain is at a severity of 8/10. The pain is severe. The pain has been constant since the incident. Pertinent negatives include no chest pain. The symptoms are aggravated by movement and lifting. She has tried ice and acetaminophen for the symptoms. The treatment provided mild relief.     Review of Systems   Constitution: Negative for chills and fever.   HENT: Negative for sore throat.    Eyes: Negative for blurred vision.   Cardiovascular: Negative for chest pain.   Respiratory: Negative for shortness of breath.    Skin: Negative for rash.   Musculoskeletal: Negative for back pain and joint pain.   Gastrointestinal: Negative for abdominal pain, diarrhea, nausea and vomiting.   Neurological: Negative for " headaches.   Psychiatric/Behavioral: The patient is not nervous/anxious.        Objective:      Physical Exam   Constitutional: She is oriented to person, place, and time. Vital signs are normal. She appears well-developed and well-nourished. She is active and cooperative. No distress.   HENT:   Head: Normocephalic and atraumatic.   Nose: Nose normal.   Mouth/Throat: Oropharynx is clear and moist and mucous membranes are normal.   Eyes: Conjunctivae and lids are normal.   Neck: Trachea normal, normal range of motion, full passive range of motion without pain and phonation normal. Neck supple.   Cardiovascular: Normal rate, regular rhythm, normal heart sounds, intact distal pulses and normal pulses.    Pulmonary/Chest: Effort normal and breath sounds normal.   Abdominal: Soft. Normal appearance and bowel sounds are normal. She exhibits no abdominal bruit, no pulsatile midline mass and no mass.   Musculoskeletal: She exhibits no edema or deformity.        Right elbow: Normal.       Right upper arm: Normal.   Pain in biceps tendon with flexion and extension of the elbow, bicep non tender to touch   Neurological: She is alert and oriented to person, place, and time. She has normal strength and normal reflexes. No sensory deficit.   Skin: Skin is warm, dry and intact. She is not diaphoretic.   Psychiatric: She has a normal mood and affect. Her speech is normal and behavior is normal. Judgment and thought content normal. Cognition and memory are normal.   Nursing note and vitals reviewed.      Assessment:       1. Biceps tendinitis of right upper extremity        Plan:         Biceps tendinitis of right upper extremity  -     methylPREDNISolone (MEDROL DOSEPACK) 4 mg tablet; Take as directed on package  Dispense: 21 tablet; Refill: 0  -     ketorolac injection 30 mg; Inject 1 mL (30 mg total) into the muscle one time.  -     SLING FOR HOME USE    pt instructed to wear the sling as needed but not to wear it at all times to  prevent muscle stiffness/spasms   Patient Instructions       FOLLOW UP WITH PCP IF SYMPTOMS PERSIST FOR FURTHER EVAL AND TREATMENT    REST THE ARM    PAIN IS YOUR LIMITATION    Tendonitis  A tendon is the thick fibrous cord that joins muscle to bone and allows joints to move. When a tendon becomes inflamed, it is called tendonitis. This can occur from overuse, injury, or infection. This usually involves the shoulders, forearm, wrist, hands and foot. Symptoms include pain, swelling and tenderness to the touch. Moving the joint increases the pain.  It takes 4 to 6 weeks for tendonitis to heal. It is treated by preventing motion of the tendon with a splint or brace and the use of anti-inflammatory medicine.  Home care  · Some people find relief with ice packs. These can be crushed or cubed ice in a plastic bag or a bag of frozen vegetables wrapped in a thin towel. Other people get better relief with heat. This can include a hot shower, hot bath, or a moist towel warmed in a microwave. Try each and use the method that feels best, for 15 to 20 minutes several times a day.  · Rest the inflamed joint and protect it from movement.  · You may use over-the-counter ibuprofen or naproxen to treat pain and inflammation, unless another medicine was prescribed. If you can't take these medicines, acetaminophen may help with the pain, but does not treat inflammation. If you have chronic liver or kidney disease or ever had a stomach ulcer or gastrointestinal bleeding, talk with your doctor before using these medicines.  · As your symptoms improve, begin gradual motion at the involved joint.  Follow-up care  Follow up with your healthcare provider if you are not improving after 5 days of treatment.  When to seek medical advice  Call your healthcare provider right away if any of these occur:  · Redness over the painful area  · Increasing pain or swelling at the joint  · Fever (1 degree above your normal temperature) lasting 24 to 48  hours Or, whatever your healthcare provider told you to report based on your condition  Date Last Reviewed: 11/21/2015 © 2000-2017 [employer]. [address] [place] [address], [place] [address]. All rights reserved. This information is not intended as a substitute for professional medical care. Always follow your healthcare professional's instructions.        Understanding Biceps Tendonitis    A tendon is a strong band of tissue that connects muscle to bone. The biceps muscle is in the front of the upper arm. It helps with movements such as bending the elbow or raising the arm. The upper end of the biceps muscle is called the proximal end. It has two tendons called the long head and the short head. These tendons attach the muscle to the bones in the shoulder. Biceps tendonitis occurs when either of these tendons is irritated or red and swollen (inflamed). Most cases involve the long head.  Causes of biceps tendonitis  Causes can include:  · Wear and tear of the tendon from aging or normal use over time  · Overuse of the tendon from sports or work activities, especially those that involve repeated overhead movements  · Injury to the tendon from a fall or other accident  · Other problems in the shoulder, such as shoulder impingement or a rotator cuff tear  Symptoms of biceps tendonitis  Common symptoms include:  · Pain in the front of the shoulder that may also travel down the arm. The pain may be worse with activity and at night.  · Swelling in the shoulder  · Clicking or catching sensation when using the arm and shoulder  · Trouble moving the arm and shoulder  Treating biceps tendonitis  Treatment for biceps tendonitis may include:  · Resting the arm and shoulder. This involves limiting certain movements, such as reaching above the head or raising the arm. These can slow healing and make symptoms worse. You may also need to limit certain sports and types of work for a time.  · Cold therapy. This involves  using items such as ice packs to help relieve symptoms. Cold can help reduce pain and swelling.  · Medicines. These help relieve pain and swelling. NSAIDs (nonsteroidal anti-inflammatory drugs) are the most common medicines used. Medicines may be prescribed or bought over-the-counter. They may be given as pills. Or they may be applied to the skin in the form of a gel, cream, or patch.  · Injections of medicine into the injured area. These help relieve pain and swelling for a time.  · Physical therapy and exercises.  These help improve strength and range of motion in the arm and shoulder.  Possible complications  · If the tendon isnt given time to heal, symptoms may worsen. Also, the tendon may tear (rupture).  · If the tendon ruptures or doesnt get better with treatment, your healthcare provider may recommend surgery. This most often involves repairing and reattaching the tendon.     When to call your healthcare provider  Call your healthcare provider right away if you have any of these:  · Fever of 100.4°F (38°C) or higher, or as directed  · Symptoms that dont get better with treatment, or get worse  · Weakness or instability in the arm or shoulder  · Sudden sharp pain, bruising, swelling, popping or snapping sensation, or bulge in the upper arm or shoulder  · New symptoms   Date Last Reviewed: 3/10/2016  © 3634-4212 [employer] [address] [place] [address], [place] [address]. All rights reserved. This information is not intended as a substitute for professional medical care. Always follow your healthcare professional's instructions.

## 2018-07-06 ENCOUNTER — TELEPHONE (OUTPATIENT)
Dept: URGENT CARE | Facility: CLINIC | Age: 63
End: 2018-07-06

## 2018-07-06 NOTE — TELEPHONE ENCOUNTER
Pt Called stating Pharmacy did not receive her RX on DOS 7/3/18, I called & spoke with pharmacy & they stated it has been ready since 7/3/18. Notified pt that RX is ready, she claimed that she went to pharmacy x3 & it was not ready. I instructed her to go back & if they give her the same message to call us back while she is at [facility].

## 2018-09-19 ENCOUNTER — OFFICE VISIT (OUTPATIENT)
Dept: URGENT CARE | Facility: CLINIC | Age: 63
End: 2018-09-19
Payer: COMMERCIAL

## 2018-09-19 VITALS
TEMPERATURE: 98 F | HEIGHT: 65 IN | RESPIRATION RATE: 16 BRPM | OXYGEN SATURATION: 99 % | DIASTOLIC BLOOD PRESSURE: 71 MMHG | WEIGHT: 180 LBS | BODY MASS INDEX: 29.99 KG/M2 | SYSTOLIC BLOOD PRESSURE: 116 MMHG | HEART RATE: 102 BPM

## 2018-09-19 DIAGNOSIS — J01.10 ACUTE FRONTAL SINUSITIS, RECURRENCE NOT SPECIFIED: Primary | ICD-10-CM

## 2018-09-19 DIAGNOSIS — J20.9 ACUTE BRONCHITIS, UNSPECIFIED ORGANISM: ICD-10-CM

## 2018-09-19 PROCEDURE — 99214 OFFICE O/P EST MOD 30 MIN: CPT | Mod: S$GLB,,, | Performed by: EMERGENCY MEDICINE

## 2018-09-19 PROCEDURE — 3008F BODY MASS INDEX DOCD: CPT | Mod: CPTII,S$GLB,, | Performed by: EMERGENCY MEDICINE

## 2018-09-19 RX ORDER — PANTOPRAZOLE SODIUM 40 MG/1
40 TABLET, DELAYED RELEASE ORAL
COMMUNITY
Start: 2015-01-08 | End: 2019-07-17

## 2018-09-19 RX ORDER — METOPROLOL SUCCINATE 25 MG/1
12.5 TABLET, EXTENDED RELEASE ORAL
COMMUNITY
Start: 2015-01-08 | End: 2019-07-17

## 2018-09-19 RX ORDER — PROMETHAZINE HYDROCHLORIDE AND DEXTROMETHORPHAN HYDROBROMIDE 6.25; 15 MG/5ML; MG/5ML
5 SYRUP ORAL EVERY 6 HOURS PRN
Qty: 180 ML | Refills: 0 | Status: SHIPPED | OUTPATIENT
Start: 2018-09-19 | End: 2018-09-29

## 2018-09-19 RX ORDER — ESTRADIOL 0.03 MG/D
1 FILM, EXTENDED RELEASE TRANSDERMAL
COMMUNITY
End: 2019-07-17

## 2018-09-19 NOTE — PATIENT INSTRUCTIONS
Please return here or go to the Emergency Department for any concerns or worsening of condition      Zyrtec, Claritin, or Allegra OTC as directed for the next 7 days    Flonase OTC as directed for the next 7 days    Salt Water Nasal Spray OTC 3x/day for the next 7 days    Alternate Tylenol and Ibuprofen every 4 hours for fever control    Mucinex or Robitussin OTC as directed for cough    Sudafed as directed for runny nose and congestion        Follow up with Primary Care or ENT if not improved in 7-10 Days:  [id number]      Sinusitis (No Antibiotics)    The sinuses are air-filled spaces within the bones of the face. They connect to the inside of the nose. Sinusitis is an inflammation of the tissue lining the sinus cavity. Sinus inflammation can occur during a cold. It can also be due to allergies to pollens and other particles in the air. It can cause symptoms such as sinus congestion, headache, sore throat, facial swelling and fullness. It may also cause a low-grade fever. No infection is present, and no antibiotic treatment is needed.  Home care  · Drink plenty of water, hot tea, and other liquids. This may help thin mucus. It also may promote sinus drainage.  · Heat may help soothe painful areas of the face. Use a towel soaked in hot water. Or,  the shower and direct the hot spray onto your face. Using a vaporizer along with a menthol rub at night may also help.   · An expectorant containing guaifenesin may help thin the mucus and promote drainage from the sinuses.  · Over-the-counter decongestants may be used unless a similar medicine was prescribed. Nasal sprays work the fastest. Use one that contains phenylephrine or oxymetazoline. First blow the nose gently. Then use the spray. Do not use these medicines more often than directed on the label or symptoms may get worse. You may also use tablets containing pseudoephedrine. Avoid products that combine ingredients, because side effects may be increased. Read  labels. You can also ask the pharmacist for help. (NOTE: Persons with high blood pressure should not use decongestants. They can raise blood pressure.)  · Over-the-counter antihistamines may help if allergies contributed to your sinusitis.    · Use acetaminophen or ibuprofen to control pain, unless another pain medicine was prescribed. (If you have chronic liver or kidney disease or ever had a stomach ulcer, talk with your doctor before using these medicines. Aspirin should never be used in anyone under 18 years of age who is ill with a fever. It may cause severe liver damage.)  · Use nasal rinses or irrigation as instructed by your health care provider.  · Don't smoke. This can worsen symptoms.  Follow-up care  Follow up with your healthcare provider or our staff if you are not improving within the next week.  When to seek medical advice  Call your healthcare provider if any of these occur:  · Green or yellow discharge from the nose or into the throat  · Facial pain or headache becoming more severe  · Stiff neck  · Unusual drowsiness or confusion  · Swelling of the forehead or eyelids  · Vision problems, including blurred or double vision  · Fever of 100.4ºF (38ºC) or higher, or as directed by your healthcare provider  · Seizure  · Breathing problems  · Symptoms not resolving within 10 days  Date Last Reviewed: 4/13/2015 © 2000-2016 [employer]. [address] [place] [address], [place] [address]. All rights reserved. This information is not intended as a substitute for professional medical care. Always follow your healthcare professional's instructions.      When to Use Antibiotics   Antibiotics are medicines used to treat infections caused by bacteria. They dont work for illnesses caused by viruses or an allergic reaction. In fact, taking antibiotics for reasons other than a bacterial infection can cause problems. For example, you may have side effects from the medicine. And if you really need an  antibiotic, it may not work well.                                                                                                                                              When antibiotics wont help  Your healthcare provider wont usually prescribe antibiotics for the following conditions. You can help by not asking for them if you have:   · A cold. This type of illness is caused by a virus. It can cause a runny nose, stuffed-up nose, sneezing, coughing, headache, mild body aches, and low fever. A cold gets better on its own in a few days to a week.  · The flu (influenza). This is a respiratory illness caused by a virus. The flu usually goes away on its own in a week or so. It can cause fever, body aches, sore throat, and fatigue.  · Bronchitis. This is an infection in the lungs most often caused by a virus. You may have coughing, phlegm, body aches, and a low fever. A common type of bronchitis is known as a chest cold (acute bronchitis). This often happens after you have a respiratory infection like a common cold. Bronchitis can take weeks to go away, but antibiotics usually dont help.  · Most sore throats. Sore throats are most often caused by viruses. Your throat may feel scratchy or achy, and it may hurt to swallow. You may also have a low fever and body aches. A sore throat usually gets better in a few days.  · Most ear infections. An ear infection may be caused by a virus or bacteria. It causes pain in the ear. Antibiotics usually dont help, and the infection goes away on its own.  · Most sinus infections (sinusitis). This kind of infection causes sinus pain and swelling, and a runny nose. In most cases, sinusitis goes away on its own, and antibiotics dont make recovery quicker.  · Allergic rhinitis. This is a set of symptoms caused by an allergic reaction. You may have sneezing, a runny nose, itchy or watery eyes, or a sore throat. Allergies are not treated with antibiotics.  · Low fever. A mild fever  thats less than 100.4°F (38°C) most likely doesnt need treatment with antibiotics.   When antibiotics can help   Antibiotics can be used to treat:                                                     · Strep throat. This is a throat infectioncaused by a certain type of bacteria. Symptoms of strep throat include a sore throat, white patches on the tonsils, red spots on the roof of the mouth, fever, body aches, and nausea and vomiting.  · Urinary tract infection (UTI). This is a bacterial infection of the bladder and the tube that takes urine out of the body. It can cause burning pain and urine thats cloudy or tinted with blood. UTIs are very common. Antibiotics usually help treat these infections.  · Some ear infections. In some cases, a healthcare provider may prescribe antibiotics for an ear infection. You may need a test to show whats causing the ear infection.  · Some sinus infections. In some cases, yourhealthcare provider may give you antibiotics. He or she may first need to make sure your symptoms arent caused by a virus, fungus, allergies, or air pollutants such as smoke.   Your doctor may also recommend antibiotics if you have a condition that can affect your immune system, such as diabetes or cancer.   Self-care at home   If your infection cant be treated with antibiotics, you can take other steps to feel better. Try the remedies below. In general:   · Rest and sleep as much as needed.  · Drink water and other clear fluids.  · Dont smoke, and avoid smoke from other people.  · Use over-the-counter medicine such as acetaminophen to ease pain or fever, as directed by your healthcare provider.   To treat sinus pain or nasal congestion:   · Put a warm, moist washcloth on your face where you feel sinus pain or pressure.  · Use a nasal spray with medicine or saline, as directed by your healthcare provider.  · Breathe in steam from a hot shower.  · Use a humidifier or cool mist vaporizer.   To quiet a  cough:   · Use a humidifier or cool mist vaporizer.  · Breathe in steam from a hot shower.  · Use cough lozenges.   To sooth a sore throat:   · Suck on ice chips, popsicles, or lozenges.  · Use a sore throat spray.  · Use a humidifier or cool mist vaporizer.  · Gargle with saltwater.  · Drink warm liquids.   To ease ear pain:   · Hold a warm, moist washcloth on the ear for 10 minutes at a time.  Date Last Reviewed: 9/1/2016 © 2000-2016 [employer]. [address] [place] [address], [place] [address]. All rights reserved. This information is not intended as a substitute for professional medical care. Always follow your healthcare professional's instructions.             What Is Acute Bronchitis?  Acute bronchitis is when the airways in your lungs (bronchial tubes) become red and swollen (inflamed). It is usually caused by a viral infection. But it can also occur because of a bacteria or allergen. Symptoms include a cough that produces yellow or greenish mucus and can last for days or sometimes weeks.  Inside healthy lungs    Air travels in and out of the lungs through the airways. The linings of these airways produce sticky mucus. This mucus traps particles that enter the lungs. Tiny structures called cilia then sweep the particles out of the airways.     Healthy airway: Airways are normally open. Air moves in and out easily.      Healthy cilia: Tiny, hairlike cilia sweep mucus and particles up and out of the airways.   Lungs with bronchitis  Bronchitis often occurs with a cold or the flu virus. The airways become inflamed (red and swollen). There is a deep hacking cough from the extra mucus. Other symptoms may include:  · Wheezing  · Chest discomfort  · Shortness of breath  · Mild fever  A second infection, this time due to bacteria, may then occur. And airways irritated by allergens or smoke are more likely to get infected.        Inflamed airway: Inflammation and extra mucus narrow the airway, causing  shortness of breath.      Impaired cilia: Extra mucus impairs cilia, causing congestion and wheezing. Smoking makes the problem worse.   Making a diagnosis  A physical exam, health history, and certain tests help your healthcare provider make the diagnosis.  Health history  Your healthcare provider will ask you about your symptoms.  The exam  Your provider listens to your chest for signs of congestion. He or she may also check your ears, nose, and throat.  Possible tests  · A sputum test for bacteria. This requires a sample of mucus from your lungs.  · A nasal or throat swab. This tests to see if you have a bacterial infection.  · A chest X-ray. This is done if your healthcare provider thinks you have pneumonia.  · Tests to check for an underlying condition. Other tests may be done to check for things such as allergies, asthma, or COPD (chronic obstructive pulmonary disease). You may need to see a specialist for more lung function testing.  Treating a cough  The main treatment for bronchitis is easing symptoms. Avoiding smoke, allergens, and other things that trigger coughing can often help. If the infection is bacterial, you may be given antibiotics. During the illness, it's important to get plenty of sleep. To ease symptoms:  · Dont smoke. Also avoid secondhand smoke.  · Use a humidifier. Or try breathing in steam from a hot shower. This may help loosen mucus.  · Drink a lot of water and juice. They can soothe the throat and may help thin mucus.  · Sit up or use extra pillows when in bed. This helps to lessen coughing and congestion.  · Ask your provider about using medicine. Ask about using cough medicine, pain and fever medicine, or a decongestant.  Antibiotics  Most cases of bronchitis are caused by cold or flu viruses. They dont need antibiotics to treat them, even if your mucus is thick and green or yellow. Antibiotics dont treat viral illness and antibiotics have not been shown to have any benefit in  cases of acute bronchitis. Taking antibiotics when they are not needed increases your risk of getting an infection later that is antibiotic-resistant. Antibiotics can also cause severe cases of diarrhea that require other antibiotics to treat.  It is important that you accept your healthcare provider's opinion to not use antibiotics. Your provider will prescribe antibiotics if the infection is caused by bacteria. If they are prescribed:  · Take all of the medicine. Take the medicine until it is used up, even if symptoms have improved. If you dont, the bronchitis may come back.  · Take the medicines as directed. For instance, some medicines should be taken with food.  · Ask about side effects. Ask your provider or pharmacist what side effects are common, and what to do about them.  Follow-up care  You should see your provider again in 2 to 3 weeks. By this time, symptoms should have improved. An infection that lasts longer may mean you have a more serious problem.  Prevention  · Avoid tobacco smoke. If you smoke, quit. Stay away from smoky places. Ask friends and family not to smoke around you, or in your home or car.  · Get checked for allergies.  · Ask your provider about getting a yearly flu shot. Also ask about pneumococcal or pneumonia shots.  · Wash your hands often. This helps reduce the chance of picking up viruses that cause colds and flu.  Call your healthcare provider if:  · Symptoms worsen, or you have new symptoms  · Breathing problems worsen or  become severe  · Symptoms dont get better within a week, or within 3 days of taking antibiotics   Date Last Reviewed: 2/1/2017  © 4890-5011 [email or web address]. [address] [place] [address], [place] [address]. All rights reserved. This information is not intended as a substitute for professional medical care. Always follow your healthcare professional's instructions.

## 2018-09-19 NOTE — PROGRESS NOTES
"Subjective:       Patient ID: Brittany Modi is a 63 y.o. female.    Vitals:    09/19/18 1455   BP: 116/71   Pulse: 102   Resp: 16   Temp: 98.1 °F (36.7 °C)   TempSrc: Tympanic   SpO2: 99%   Weight: 81.6 kg (180 lb)   Height: 5' 5" (1.651 m)       Chief Complaint: Cough    Patient reports Dry cough that started yesterday along with ear fullness,nasal congestion,sinus drip and sinus pressure.      Cough   This is a new problem. The current episode started yesterday. The problem has been gradually worsening. The problem occurs every few minutes. The cough is non-productive. Associated symptoms include ear congestion, headaches, nasal congestion, postnasal drip, rhinorrhea and a sore throat. Pertinent negatives include no chest pain, chills, ear pain, eye redness, fever, myalgias, shortness of breath or wheezing. Treatments tried: Mucinex DM. The treatment provided no relief. There is no history of asthma or bronchitis.     Review of Systems   Constitution: Negative for chills, fever and malaise/fatigue.   HENT: Positive for congestion (nasal), postnasal drip, rhinorrhea and sore throat. Negative for ear pain and hoarse voice.         Ear fullness  Sinus drip   Sinus pressure   Eyes: Negative for discharge and redness.   Cardiovascular: Negative for chest pain, dyspnea on exertion and leg swelling.   Respiratory: Positive for cough (Dry). Negative for shortness of breath, sputum production and wheezing.    Musculoskeletal: Negative for myalgias.   Gastrointestinal: Negative for abdominal pain and nausea.   Neurological: Positive for headaches.       Objective:      Physical Exam   Constitutional: She is oriented to person, place, and time. She appears well-developed and well-nourished. She is cooperative.  Non-toxic appearance. She does not appear ill. No distress.   HENT:   Head: Normocephalic and atraumatic.   Right Ear: Hearing, tympanic membrane, external ear and ear canal normal.   Left Ear: Hearing, tympanic " membrane, external ear and ear canal normal.   Nose: Mucosal edema and rhinorrhea present. No nasal deformity. No epistaxis. Right sinus exhibits frontal sinus tenderness. Right sinus exhibits no maxillary sinus tenderness. Left sinus exhibits frontal sinus tenderness. Left sinus exhibits no maxillary sinus tenderness.   Mouth/Throat: Uvula is midline, oropharynx is clear and moist and mucous membranes are normal. No trismus in the jaw. Normal dentition. No uvula swelling. No posterior oropharyngeal erythema.   Eyes: Conjunctivae and lids are normal. No scleral icterus.   Sclera clear bilat   Neck: Trachea normal, full passive range of motion without pain and phonation normal. Neck supple.   Cardiovascular: Normal rate, regular rhythm, normal heart sounds, intact distal pulses and normal pulses.   Pulmonary/Chest: Effort normal and breath sounds normal. No respiratory distress.   Frequent cough on exam     Abdominal: Soft. Normal appearance and bowel sounds are normal. She exhibits no distension. There is no tenderness.   Musculoskeletal: Normal range of motion. She exhibits no edema or deformity.   Neurological: She is alert and oriented to person, place, and time. She exhibits normal muscle tone. Coordination normal.   Skin: Skin is warm, dry and intact. She is not diaphoretic. No pallor.   Psychiatric: She has a normal mood and affect. Her speech is normal and behavior is normal. Judgment and thought content normal. Cognition and memory are normal.   Nursing note and vitals reviewed.      Assessment:       1. Acute frontal sinusitis, recurrence not specified    2. Acute bronchitis, unspecified organism        Plan:       Brittany was seen today for cough.    Diagnoses and all orders for this visit:    Acute frontal sinusitis, recurrence not specified    Acute bronchitis, unspecified organism    Other orders  -     promethazine-dextromethorphan (PROMETHAZINE-DM) 6.25-15 mg/5 mL Syrp; Take 5 mLs by mouth every 6  (six) hours as needed.      Patient Instructions     Please return here or go to the Emergency Department for any concerns or worsening of condition      Zyrtec, Claritin, or Allegra OTC as directed for the next 7 days    Flonase OTC as directed for the next 7 days    Salt Water Nasal Spray OTC 3x/day for the next 7 days    Alternate Tylenol and Ibuprofen every 4 hours for fever control    Mucinex or Robitussin OTC as directed for cough    Sudafed as directed for runny nose and congestion        Follow up with Primary Care or ENT if not improved in 7-10 Days:  [id number]      Sinusitis (No Antibiotics)    The sinuses are air-filled spaces within the bones of the face. They connect to the inside of the nose. Sinusitis is an inflammation of the tissue lining the sinus cavity. Sinus inflammation can occur during a cold. It can also be due to allergies to pollens and other particles in the air. It can cause symptoms such as sinus congestion, headache, sore throat, facial swelling and fullness. It may also cause a low-grade fever. No infection is present, and no antibiotic treatment is needed.  Home care  · Drink plenty of water, hot tea, and other liquids. This may help thin mucus. It also may promote sinus drainage.  · Heat may help soothe painful areas of the face. Use a towel soaked in hot water. Or,  the shower and direct the hot spray onto your face. Using a vaporizer along with a menthol rub at night may also help.   · An expectorant containing guaifenesin may help thin the mucus and promote drainage from the sinuses.  · Over-the-counter decongestants may be used unless a similar medicine was prescribed. Nasal sprays work the fastest. Use one that contains phenylephrine or oxymetazoline. First blow the nose gently. Then use the spray. Do not use these medicines more often than directed on the label or symptoms may get worse. You may also use tablets containing pseudoephedrine. Avoid products that combine  ingredients, because side effects may be increased. Read labels. You can also ask the pharmacist for help. (NOTE: Persons with high blood pressure should not use decongestants. They can raise blood pressure.)  · Over-the-counter antihistamines may help if allergies contributed to your sinusitis.    · Use acetaminophen or ibuprofen to control pain, unless another pain medicine was prescribed. (If you have chronic liver or kidney disease or ever had a stomach ulcer, talk with your doctor before using these medicines. Aspirin should never be used in anyone under 18 years of age who is ill with a fever. It may cause severe liver damage.)  · Use nasal rinses or irrigation as instructed by your health care provider.  · Don't smoke. This can worsen symptoms.  Follow-up care  Follow up with your healthcare provider or our staff if you are not improving within the next week.  When to seek medical advice  Call your healthcare provider if any of these occur:  · Green or yellow discharge from the nose or into the throat  · Facial pain or headache becoming more severe  · Stiff neck  · Unusual drowsiness or confusion  · Swelling of the forehead or eyelids  · Vision problems, including blurred or double vision  · Fever of 100.4ºF (38ºC) or higher, or as directed by your healthcare provider  · Seizure  · Breathing problems  · Symptoms not resolving within 10 days  Date Last Reviewed: 4/13/2015  © 1786-6651 [employer]. [address] [place] [address], [place] [address]. All rights reserved. This information is not intended as a substitute for professional medical care. Always follow your healthcare professional's instructions.      When to Use Antibiotics   Antibiotics are medicines used to treat infections caused by bacteria. They dont work for illnesses caused by viruses or an allergic reaction. In fact, taking antibiotics for reasons other than a bacterial infection can cause problems. For example, you may have side  effects from the medicine. And if you really need an antibiotic, it may not work well.                                                                                                                                              When antibiotics wont help  Your healthcare provider wont usually prescribe antibiotics for the following conditions. You can help by not asking for them if you have:   · A cold. This type of illness is caused by a virus. It can cause a runny nose, stuffed-up nose, sneezing, coughing, headache, mild body aches, and low fever. A cold gets better on its own in a few days to a week.  · The flu (influenza). This is a respiratory illness caused by a virus. The flu usually goes away on its own in a week or so. It can cause fever, body aches, sore throat, and fatigue.  · Bronchitis. This is an infection in the lungs most often caused by a virus. You may have coughing, phlegm, body aches, and a low fever. A common type of bronchitis is known as a chest cold (acute bronchitis). This often happens after you have a respiratory infection like a common cold. Bronchitis can take weeks to go away, but antibiotics usually dont help.  · Most sore throats. Sore throats are most often caused by viruses. Your throat may feel scratchy or achy, and it may hurt to swallow. You may also have a low fever and body aches. A sore throat usually gets better in a few days.  · Most ear infections. An ear infection may be caused by a virus or bacteria. It causes pain in the ear. Antibiotics usually dont help, and the infection goes away on its own.  · Most sinus infections (sinusitis). This kind of infection causes sinus pain and swelling, and a runny nose. In most cases, sinusitis goes away on its own, and antibiotics dont make recovery quicker.  · Allergic rhinitis. This is a set of symptoms caused by an allergic reaction. You may have sneezing, a runny nose, itchy or watery eyes, or a sore throat. Allergies are not  treated with antibiotics.  · Low fever. A mild fever thats less than 100.4°F (38°C) most likely doesnt need treatment with antibiotics.   When antibiotics can help   Antibiotics can be used to treat:                                                     · Strep throat. This is a throat infectioncaused by a certain type of bacteria. Symptoms of strep throat include a sore throat, white patches on the tonsils, red spots on the roof of the mouth, fever, body aches, and nausea and vomiting.  · Urinary tract infection (UTI). This is a bacterial infection of the bladder and the tube that takes urine out of the body. It can cause burning pain and urine thats cloudy or tinted with blood. UTIs are very common. Antibiotics usually help treat these infections.  · Some ear infections. In some cases, a healthcare provider may prescribe antibiotics for an ear infection. You may need a test to show whats causing the ear infection.  · Some sinus infections. In some cases, yourhealthcare provider may give you antibiotics. He or she may first need to make sure your symptoms arent caused by a virus, fungus, allergies, or air pollutants such as smoke.   Your doctor may also recommend antibiotics if you have a condition that can affect your immune system, such as diabetes or cancer.   Self-care at home   If your infection cant be treated with antibiotics, you can take other steps to feel better. Try the remedies below. In general:   · Rest and sleep as much as needed.  · Drink water and other clear fluids.  · Dont smoke, and avoid smoke from other people.  · Use over-the-counter medicine such as acetaminophen to ease pain or fever, as directed by your healthcare provider.   To treat sinus pain or nasal congestion:   · Put a warm, moist washcloth on your face where you feel sinus pain or pressure.  · Use a nasal spray with medicine or saline, as directed by your healthcare provider.  · Breathe in steam from a hot shower.  · Use a  humidifier or cool mist vaporizer.   To quiet a cough:   · Use a humidifier or cool mist vaporizer.  · Breathe in steam from a hot shower.  · Use cough lozenges.   To sooth a sore throat:   · Suck on ice chips, popsicles, or lozenges.  · Use a sore throat spray.  · Use a humidifier or cool mist vaporizer.  · Gargle with saltwater.  · Drink warm liquids.   To ease ear pain:   · Hold a warm, moist washcloth on the ear for 10 minutes at a time.  Date Last Reviewed: 9/1/2016 © 2000-2016 [employer]. [address] [place] [address], [place] [address]. All rights reserved. This information is not intended as a substitute for professional medical care. Always follow your healthcare professional's instructions.             What Is Acute Bronchitis?  Acute bronchitis is when the airways in your lungs (bronchial tubes) become red and swollen (inflamed). It is usually caused by a viral infection. But it can also occur because of a bacteria or allergen. Symptoms include a cough that produces yellow or greenish mucus and can last for days or sometimes weeks.  Inside healthy lungs    Air travels in and out of the lungs through the airways. The linings of these airways produce sticky mucus. This mucus traps particles that enter the lungs. Tiny structures called cilia then sweep the particles out of the airways.     Healthy airway: Airways are normally open. Air moves in and out easily.      Healthy cilia: Tiny, hairlike cilia sweep mucus and particles up and out of the airways.   Lungs with bronchitis  Bronchitis often occurs with a cold or the flu virus. The airways become inflamed (red and swollen). There is a deep hacking cough from the extra mucus. Other symptoms may include:  · Wheezing  · Chest discomfort  · Shortness of breath  · Mild fever  A second infection, this time due to bacteria, may then occur. And airways irritated by allergens or smoke are more likely to get infected.        Inflamed airway: Inflammation  and extra mucus narrow the airway, causing shortness of breath.      Impaired cilia: Extra mucus impairs cilia, causing congestion and wheezing. Smoking makes the problem worse.   Making a diagnosis  A physical exam, health history, and certain tests help your healthcare provider make the diagnosis.  Health history  Your healthcare provider will ask you about your symptoms.  The exam  Your provider listens to your chest for signs of congestion. He or she may also check your ears, nose, and throat.  Possible tests  · A sputum test for bacteria. This requires a sample of mucus from your lungs.  · A nasal or throat swab. This tests to see if you have a bacterial infection.  · A chest X-ray. This is done if your healthcare provider thinks you have pneumonia.  · Tests to check for an underlying condition. Other tests may be done to check for things such as allergies, asthma, or COPD (chronic obstructive pulmonary disease). You may need to see a specialist for more lung function testing.  Treating a cough  The main treatment for bronchitis is easing symptoms. Avoiding smoke, allergens, and other things that trigger coughing can often help. If the infection is bacterial, you may be given antibiotics. During the illness, it's important to get plenty of sleep. To ease symptoms:  · Dont smoke. Also avoid secondhand smoke.  · Use a humidifier. Or try breathing in steam from a hot shower. This may help loosen mucus.  · Drink a lot of water and juice. They can soothe the throat and may help thin mucus.  · Sit up or use extra pillows when in bed. This helps to lessen coughing and congestion.  · Ask your provider about using medicine. Ask about using cough medicine, pain and fever medicine, or a decongestant.  Antibiotics  Most cases of bronchitis are caused by cold or flu viruses. They dont need antibiotics to treat them, even if your mucus is thick and green or yellow. Antibiotics dont treat viral illness and antibiotics have  not been shown to have any benefit in cases of acute bronchitis. Taking antibiotics when they are not needed increases your risk of getting an infection later that is antibiotic-resistant. Antibiotics can also cause severe cases of diarrhea that require other antibiotics to treat.  It is important that you accept your healthcare provider's opinion to not use antibiotics. Your provider will prescribe antibiotics if the infection is caused by bacteria. If they are prescribed:  · Take all of the medicine. Take the medicine until it is used up, even if symptoms have improved. If you dont, the bronchitis may come back.  · Take the medicines as directed. For instance, some medicines should be taken with food.  · Ask about side effects. Ask your provider or pharmacist what side effects are common, and what to do about them.  Follow-up care  You should see your provider again in 2 to 3 weeks. By this time, symptoms should have improved. An infection that lasts longer may mean you have a more serious problem.  Prevention  · Avoid tobacco smoke. If you smoke, quit. Stay away from smoky places. Ask friends and family not to smoke around you, or in your home or car.  · Get checked for allergies.  · Ask your provider about getting a yearly flu shot. Also ask about pneumococcal or pneumonia shots.  · Wash your hands often. This helps reduce the chance of picking up viruses that cause colds and flu.  Call your healthcare provider if:  · Symptoms worsen, or you have new symptoms  · Breathing problems worsen or  become severe  · Symptoms dont get better within a week, or within 3 days of taking antibiotics   Date Last Reviewed: 2/1/2017  © 8811-8800 [employer]. [address] [place] [address], [place] [address]. All rights reserved. This information is not intended as a substitute for professional medical care. Always follow your healthcare professional's instructions.

## 2018-09-19 NOTE — LETTER
September 19, 2018      [facility] [address] [place]  [facility] [address]  Phone: [phone]  Fax: [phone]       Patient: Brittany Modi   YOB: 1955  Date of Visit: 09/19/2018    To Whom It May Concern:    Kristie Modi  was at [facility] on 09/19/2018. She may return to work/school on 9/22/18 with no restrictions. If you have any questions or concerns, or if I can be of further assistance, please do not hesitate to contact me.    Sincerely,    Bartolome Darden III, MD

## 2018-09-24 ENCOUNTER — TELEPHONE (OUTPATIENT)
Dept: URGENT CARE | Facility: CLINIC | Age: 63
End: 2018-09-24

## 2018-09-24 RX ORDER — CEFDINIR 300 MG/1
300 CAPSULE ORAL EVERY 12 HOURS
Qty: 20 CAPSULE | Refills: 0 | Status: SHIPPED | OUTPATIENT
Start: 2018-09-24 | End: 2018-10-04

## 2018-09-24 NOTE — TELEPHONE ENCOUNTER
CALLED AS NOT IMPROVED WITH REGIMEN GIVEN IN CLINIC. WAS TOLD TO CALL BACK FOR ANTIBIOTIC IF NOT IMPROVED IN 5 DAYS. WELL, SHE IS NOT BETTER IN 5 DAYS AND STATES SHE DOES NOT WANT TO PAY ANOTHER 30$ TO BE SEEN, AND SHE CAN'T JUST TAKE OFF WORK TO GO SEE HER PCP WHICH HAD BEEN RECOMMENDED TO HER. I THERE FORE SENT OVER SINUS APPROPRIATE ANTIBIOTIC TO HER PHARMACY OF CHOICE FOR HER GIVEN HER PERSISTENCE OF SYMPTOMS OF FACIAL PRESSURE AND ALSO POST NASAL DRIP, SINUS PAIN, COUGH, CONGESTION. DENIES SOB OR FEVERS. ENCOURAGED TO RETURN IF NOT BETTER OR WORSE, BUT THAT WE WOULD DO THIS FOR HER.    9/24/18  11:33  DEVENDRA SCHMIDT

## 2018-10-08 ENCOUNTER — TELEPHONE (OUTPATIENT)
Dept: OBSTETRICS AND GYNECOLOGY | Facility: CLINIC | Age: 63
End: 2018-10-08

## 2018-10-08 NOTE — TELEPHONE ENCOUNTER
----- Message from Sarah Bonilla sent at 10/8/2018 10:26 AM CDT -----  Contact: pt   Name of Who is Calling: KERMIT LICONA [[id number]]       What is the request in detail: patient is requesting a call back she needs appointment for pellet insertion..... Please contact to further discuss and advise.       Can the clinic reply by [facility]: no       What Number to Call Back if not in [facility]: [phone]

## 2018-10-09 ENCOUNTER — TELEPHONE (OUTPATIENT)
Dept: OBSTETRICS AND GYNECOLOGY | Facility: CLINIC | Age: 63
End: 2018-10-09

## 2018-10-09 ENCOUNTER — LAB VISIT (OUTPATIENT)
Dept: LAB | Facility: HOSPITAL | Age: 63
End: 2018-10-09
Attending: OBSTETRICS & GYNECOLOGY
Payer: COMMERCIAL

## 2018-10-09 DIAGNOSIS — R53.83 FATIGUE, UNSPECIFIED TYPE: ICD-10-CM

## 2018-10-09 DIAGNOSIS — N95.1 MENOPAUSAL SYMPTOMS: Primary | ICD-10-CM

## 2018-10-09 DIAGNOSIS — N95.1 MENOPAUSAL SYMPTOMS: ICD-10-CM

## 2018-10-09 PROCEDURE — 84402 ASSAY OF FREE TESTOSTERONE: CPT

## 2018-10-09 PROCEDURE — 82670 ASSAY OF TOTAL ESTRADIOL: CPT

## 2018-10-09 NOTE — TELEPHONE ENCOUNTER
----- Message from Sarah Bonilla sent at 10/9/2018  9:50 AM CDT -----  Contact: pt   Name of Who is Calling: KERMIT LICONA [[id number]]       What is the request in detail:patient is requesting a call back in regards to an pelvic insertion... Please contact to further discuss and advise.       Can the clinic reply by [facility]: no       [facility]: [phone]

## 2018-10-09 NOTE — TELEPHONE ENCOUNTER
----- Message from Sarah Bonilla sent at 10/9/2018 10:22 AM CDT -----  Contact: pt   Name of Who is Calling: KERMIT LICONA [[id number]]       What is the request in detail:patient is returning a call.... Please contact to further discuss and advise.       Can the clinic reply by [facility]: no       What [facility]: [phone]

## 2018-10-10 ENCOUNTER — PATIENT MESSAGE (OUTPATIENT)
Dept: OBSTETRICS AND GYNECOLOGY | Facility: CLINIC | Age: 63
End: 2018-10-10

## 2018-10-10 LAB — ESTRADIOL SERPL-MCNC: 10 PG/ML

## 2018-10-12 LAB — TESTOST FREE SERPL-MCNC: 0.8 PG/ML

## 2018-10-17 ENCOUNTER — PATIENT MESSAGE (OUTPATIENT)
Dept: OBSTETRICS AND GYNECOLOGY | Facility: CLINIC | Age: 63
End: 2018-10-17

## 2018-10-25 ENCOUNTER — PATIENT MESSAGE (OUTPATIENT)
Dept: OBSTETRICS AND GYNECOLOGY | Facility: CLINIC | Age: 63
End: 2018-10-25

## 2018-10-30 ENCOUNTER — PROCEDURE VISIT (OUTPATIENT)
Dept: OBSTETRICS AND GYNECOLOGY | Facility: CLINIC | Age: 63
End: 2018-10-30
Attending: OBSTETRICS & GYNECOLOGY

## 2018-10-30 VITALS
DIASTOLIC BLOOD PRESSURE: 80 MMHG | HEIGHT: 65 IN | WEIGHT: 179.69 LBS | BODY MASS INDEX: 29.94 KG/M2 | SYSTOLIC BLOOD PRESSURE: 138 MMHG

## 2018-10-30 DIAGNOSIS — Z78.0 MENOPAUSE: Primary | ICD-10-CM

## 2018-10-30 DIAGNOSIS — N95.1 SYMPTOMATIC MENOPAUSAL OR FEMALE CLIMACTERIC STATES: ICD-10-CM

## 2018-10-30 PROCEDURE — 11980 IMPLANT HORMONE PELLET(S): CPT | Mod: CSM,S$GLB,, | Performed by: OBSTETRICS & GYNECOLOGY

## 2018-10-30 RX ORDER — PROGESTERONE 100 MG/1
100 CAPSULE ORAL NIGHTLY
Qty: 90 CAPSULE | Refills: 5 | Status: SHIPPED | OUTPATIENT
Start: 2018-10-30 | End: 2019-07-17

## 2018-10-30 NOTE — PATIENT INSTRUCTIONS
Follow the information you have been given by your doctor. Here are some additional instructions and reminders on how to care for yourself once you're home.     Keep pressure on the area for 1 hour. You may have some discomfort. This will go away.   A slight bloody discharge is normal.   A slight yellow discharge of serum is normal.   Pellet can be felt below the skin cut. This is normal.   Breast and vulva tingling and feeling of fullness is normal.   Change your bandage daily for 5 to 7 days.   If small butterfly tape comes off, you do not need to do anything.   You may remove butterfly tape after 5 days.   You may shower the next morning after your procedure. Do NOT take a bath.   Change your bandage if it becomes wet.   If one small white pellet comes out, just wipe off with alcohol and throw away.   If area becomes tender and inflamed, take the antibiotic medication you have been given.   If the suture looks like it has melted, schedule an appointment to have it removed.   Do not do exercise or stretch for 3 days.   Some fluid retention can occur in first weeks or later.   In about 1 month, this area will feel like a lump. This is normal.    If you have any questions, contact the [facility] at [phone].    SUSANA Azar MD  [facility]  [address] [facility], [address]  Phone: [phone]  Fax: [phone]

## 2018-10-30 NOTE — LETTER
October 30, 2018      [facility]  [address], [address]  [facility] [address]  Phone: [phone]  Fax: [phone]       Patient: Brittany Modi   YOB: 1955  Date of Visit: 10/30/2018    To Whom It May Concern:    Kristie Modi  was at [facility] on 10/30/2018. She may return to work on .10/30/2018 with no restrictions. If you have any questions or concerns, or if I can be of further assistance, please do not hesitate to contact me.    Sincerely,          Geri Azar MD

## 2018-11-23 ENCOUNTER — TELEPHONE (OUTPATIENT)
Dept: OBSTETRICS AND GYNECOLOGY | Facility: CLINIC | Age: 63
End: 2018-11-23

## 2018-11-23 NOTE — TELEPHONE ENCOUNTER
Spoke with patient whom complained of breast tenderness and vaginal bleeding since starting progesterone. Patient was informed that breast tenderness is a side effect of the estrogen and vaginal  Bleeding is a side effect of progesterone. She was advised to double up on progesterone for seven days and there after continue one a day. Will f/u with patient on Tuesday. Patient has no further questions or complaints.

## 2018-11-29 ENCOUNTER — TELEPHONE (OUTPATIENT)
Dept: OBSTETRICS AND GYNECOLOGY | Facility: CLINIC | Age: 63
End: 2018-11-29

## 2018-11-29 NOTE — TELEPHONE ENCOUNTER
----- Message from Sarah Bonilla sent at 11/29/2018  8:56 AM CST -----  Contact: pt   Name of Who is Calling: KERMIT LICONA [[id number]]       What is the request in detail:   Patient is requesting a call in regards to medication she was prescribed she states she is having PMS symptoms, and tender breast ........Please contact to further discuss and advise.       Can the clinic reply by [facility]: no       What [facility]: [phone]

## 2018-12-03 ENCOUNTER — TELEPHONE (OUTPATIENT)
Dept: OBSTETRICS AND GYNECOLOGY | Facility: CLINIC | Age: 63
End: 2018-12-03

## 2018-12-03 DIAGNOSIS — N95.0 POSTMENOPAUSAL BLEEDING: Primary | ICD-10-CM

## 2018-12-03 NOTE — TELEPHONE ENCOUNTER
----- Message from Megan Lind sent at 12/3/2018  9:53 AM CST -----  Contact: Pt   Name of Who is Calling: KERMIT LICONA [[id number]]      What is the request in detail: Patient states she didn't receive a call from staff in regards to the symptoms she's having with her medication. Please contact to further discuss and advise      Can the clinic reply by [facility]: No       What Number to Call Back if not in [facility]: [phone]

## 2018-12-03 NOTE — TELEPHONE ENCOUNTER
Spoke with patient and advised her to stop taking the progesterone for 2 weeks and patient is scheduled for a pelvic ultrasound. Patient has no further questions or complaints.

## 2018-12-07 ENCOUNTER — HOSPITAL ENCOUNTER (OUTPATIENT)
Dept: RADIOLOGY | Facility: OTHER | Age: 63
Discharge: HOME | End: 2018-12-07
Attending: OBSTETRICS & GYNECOLOGY
Payer: COMMERCIAL

## 2018-12-07 DIAGNOSIS — N95.0 POSTMENOPAUSAL BLEEDING: ICD-10-CM

## 2018-12-07 PROCEDURE — 76856 US EXAM PELVIC COMPLETE: CPT | Mod: TC

## 2018-12-07 PROCEDURE — 76830 TRANSVAGINAL US NON-OB: CPT | Mod: 26,,, | Performed by: INTERNAL MEDICINE

## 2018-12-07 PROCEDURE — 76830 TRANSVAGINAL US NON-OB: CPT | Mod: TC

## 2018-12-07 PROCEDURE — 76856 US EXAM PELVIC COMPLETE: CPT | Mod: 26,,, | Performed by: INTERNAL MEDICINE

## 2018-12-17 ENCOUNTER — PATIENT MESSAGE (OUTPATIENT)
Dept: OBSTETRICS AND GYNECOLOGY | Facility: CLINIC | Age: 63
End: 2018-12-17

## 2018-12-17 ENCOUNTER — TELEPHONE (OUTPATIENT)
Dept: OBSTETRICS AND GYNECOLOGY | Facility: CLINIC | Age: 63
End: 2018-12-17

## 2018-12-17 NOTE — TELEPHONE ENCOUNTER
----- Message from Geri Azar MD sent at 12/17/2018  8:44 AM CST -----  Padma, Please call this pt to come in for EMBx in January.  Thanks  (sent her message)

## 2018-12-17 NOTE — TELEPHONE ENCOUNTER
"----- Message from Nataly Cruz sent at 12/17/2018  1:10 PM CST -----  Contact: KERMIT LICONA [[id number]]            Name of Who is Calling: KERMIT LICONA [[id number]]      What is the request in detail:   Patient called and said, "she's returning your call and would like you to please call again."   THANKS!      Can the clinic reply by [facility]: No      What Number to Call Back: KERMIT LICONA / ph# [phone]                                    "

## 2019-01-24 ENCOUNTER — TELEPHONE (OUTPATIENT)
Dept: OBSTETRICS AND GYNECOLOGY | Facility: CLINIC | Age: 64
End: 2019-01-24

## 2019-01-24 NOTE — TELEPHONE ENCOUNTER
----- Message from Ganga Mari LPN sent at 1/23/2019  4:28 PM CST -----  Pt is needing to r/s her EMBX that is scheduled for 1/24/19. The pt can be reached at [phone].     Pt canceled for 1/24/19

## 2019-02-01 ENCOUNTER — OFFICE VISIT (OUTPATIENT)
Dept: URGENT CARE | Facility: CLINIC | Age: 64
End: 2019-02-01
Payer: COMMERCIAL

## 2019-02-01 VITALS
OXYGEN SATURATION: 100 % | WEIGHT: 180 LBS | BODY MASS INDEX: 29.99 KG/M2 | SYSTOLIC BLOOD PRESSURE: 144 MMHG | DIASTOLIC BLOOD PRESSURE: 70 MMHG | HEIGHT: 65 IN | RESPIRATION RATE: 16 BRPM | HEART RATE: 66 BPM | TEMPERATURE: 98 F

## 2019-02-01 DIAGNOSIS — N89.8 VAGINAL DISCHARGE: ICD-10-CM

## 2019-02-01 DIAGNOSIS — N39.0 URINARY TRACT INFECTION WITHOUT HEMATURIA, SITE UNSPECIFIED: Primary | ICD-10-CM

## 2019-02-01 LAB
BILIRUB UR QL STRIP: NEGATIVE
GLUCOSE UR QL STRIP: NEGATIVE
KETONES UR QL STRIP: NEGATIVE
LEUKOCYTE ESTERASE UR QL STRIP: POSITIVE
PH, POC UA: 6 (ref 5–8)
POC BLOOD, URINE: NEGATIVE
POC NITRATES, URINE: NEGATIVE
PROT UR QL STRIP: NEGATIVE
SP GR UR STRIP: 1.02 (ref 1–1.03)
UROBILINOGEN UR STRIP-ACNC: NORMAL (ref 0.1–1.1)

## 2019-02-01 PROCEDURE — 3008F BODY MASS INDEX DOCD: CPT | Mod: CPTII,S$GLB,, | Performed by: PHYSICIAN ASSISTANT

## 2019-02-01 PROCEDURE — 99214 PR OFFICE/OUTPT VISIT, EST, LEVL IV, 30-39 MIN: ICD-10-PCS | Mod: 25,S$GLB,, | Performed by: PHYSICIAN ASSISTANT

## 2019-02-01 PROCEDURE — 81003 POCT URINALYSIS, DIPSTICK, AUTOMATED, W/O SCOPE: ICD-10-PCS | Mod: QW,S$GLB,, | Performed by: PHYSICIAN ASSISTANT

## 2019-02-01 PROCEDURE — 3008F PR BODY MASS INDEX (BMI) DOCUMENTED: ICD-10-PCS | Mod: CPTII,S$GLB,, | Performed by: PHYSICIAN ASSISTANT

## 2019-02-01 PROCEDURE — 99214 OFFICE O/P EST MOD 30 MIN: CPT | Mod: 25,S$GLB,, | Performed by: PHYSICIAN ASSISTANT

## 2019-02-01 PROCEDURE — 81003 URINALYSIS AUTO W/O SCOPE: CPT | Mod: QW,S$GLB,, | Performed by: PHYSICIAN ASSISTANT

## 2019-02-01 RX ORDER — NITROFURANTOIN 25; 75 MG/1; MG/1
100 CAPSULE ORAL 2 TIMES DAILY
Qty: 10 CAPSULE | Refills: 0 | Status: SHIPPED | OUTPATIENT
Start: 2019-02-01 | End: 2019-02-06

## 2019-02-01 RX ORDER — FLUCONAZOLE 150 MG/1
150 TABLET ORAL DAILY
Qty: 1 TABLET | Refills: 1 | Status: SHIPPED | OUTPATIENT
Start: 2019-02-01 | End: 2019-02-02

## 2019-02-01 NOTE — PATIENT INSTRUCTIONS
- Rest.    - Drink plenty of fluids.    - Tylenol or Ibuprofen as directed as needed for fever/pain.      - You have been given an antibiotic to treat your condition today.    - Please complete the antibiotic as directed on the bottle.   - If you are female and on oral birth control pills, use additional methods to prevent pregnancy while on antibiotics and for one cycle after.     - You can take over the counter AZO as prescribed for discomfort with urination.     -   Fluconazole is a yeast infection medication to treat yeast infection.  The treatment is just 1 pill ( 150 mg) taken once.  I gave the 1 refill in case you get an antibiotic-induced yeast infection.    - we will call you in the next 3-7 days regarding your lab results    - Follow up with your PCP or gynecologist as directed in the next 1-2 weeks if not improved or as needed.  You can call [phone] to schedule an appointment with the appropriate provider.    - Go to the ER if your symptoms worsen.    - You must understand that you have received an Urgent Care treatment only and that you may be released before all of your medical problems are known or treated.   - You, the patient, will arrange for follow up care as instructed.   - If your condition worsens or fails to improve we recommend that you receive another evaluation at the ER immediately or contact your PCP to discuss your concerns or return here.       Urinary Tract Infections in Women    Urinary tract infections (UTIs) are most often caused by bacteria (germs). These bacteria enter the urinary tract. The bacteria may come from outside the body. Or they may travel from the skin outside the rectum or vagina into the urethra. Female anatomy makes it easy for bacteria from the bowel to enter a womans urinary tract, which is the most common source of UTI. This means women develop UTIs more often than men. Pain in or around the urinary tract is a common UTI symptom. But the only way to know  for sure if you have a UTI for the healthcare provider to test your urine. The two tests that may be done are the urinalysis and urine culture.  Types of UTIs  · Cystitis: A bladder infection (cystitis) is the most common UTI in women. You may have urgent or frequent urination. You may also have pain, burning when you urinate, and bloody urine.  · Urethritis: This is an inflamed urethra, which is the tube that carries urine from the bladder to outside the body. You may have lower stomach or back pain. You may also have urgent or frequent urination.  · Pyelonephritis: This is a kidney infection. If not treated, it can be serious and damage your kidneys. In severe cases, you may be hospitalized. You may have a fever and lower back pain.  Medicines to treat a UTI  Most UTIs are treated with antibiotics. These kill the bacteria. The length of time you need to take them depends on the type of infection. It may be as short as 3 days. If you have repeated UTIs, a low-dose antibiotic may be needed for several months. Take antibiotics exactly as directed. Dont stop taking them until all of the medicine is gone. If you stop taking the antibiotic too soon, the infection may not go away, and you may develop a resistance to the antibiotic. This can make it much harder to treat.  Lifestyle changes to treat and prevent UTIs  The lifestyle changes below will help get rid of your UTI. They may also help prevent future UTIs.  · Drink plenty of fluids. This includes water, juice, or other caffeine-free drinks. Fluids help flush bacteria out of your body.  · Empty your bladder. Always empty your bladder when you feel the urge to urinate. And always urinate before going to sleep. Urine that stays in your bladder can lead to infection. Try to urinate before and after sex as well.  · Practice good personal hygiene. Wipe yourself from front to back after using the toilet. This helps keep bacteria from getting into the urethra.  · Use  condoms during sex. These help prevent UTIs caused by sexually transmitted bacteria. Also, avoid using spermicides during sex. These can increase the risk of UTIs. Choose other forms of birth control instead. For women who tend to get UTIs after sex, a low-dose of a preventive antibiotic may be used. Be sure to discuss this option with your healthcare provider.  · Follow up with your healthcare provider as directed. He or she may test to make sure the infection has cleared. If needed, more treatment may be started.  Date Last Reviewed: 1/1/2017 © 2000-2017 [employer]. [address] [place] [address]. All rights reserved. This information is not intended as a substitute for professional medical care. Always follow your healthcare professional's instructions.        Candida Vaginal Infection    You have a Candida vaginal infection. This is also known as a yeast infection. It is most often caused by a type of yeast (fungus) called Candida. Candida are normally found in the vagina. But if they increase in number, this can lead to infection and cause symptoms.  Symptoms of a yeast infection can include:  · Clumpy or thin, white discharge, which may look like cottage cheese  · Itching or burning  · Burning with urination  Certain factors can make a yeast infection more likely. These can include:  · Taking certain medicines, such as antibiotics or birth control pills  · Pregnancy  · Diabetes  · Weakened immune system  A yeast infection is most often treated with antifungal medicine. This may be given as a vaginal cream or pills you take by mouth. Treatment may last for about 1 to 7 days. Women with severe or recurrent infections may need longer courses of treatment.  Home care  · If youre prescribed medicine, be sure to use it as directed. Finish all of the medicine, even if your symptoms go away. Note: Dont try to treat yourself using over-the-counter products without talking to your provider  first. He or she will let you know if this is a good option for you.  · Ask your provider what steps you can take to help reduce your risk of having a yeast infection in the future.  Follow-up care  Follow up with your healthcare provider, or as directed.  When to seek medical advice  Call your healthcare provider right away if:  · You have a fever of 100.4ºF (38ºC) or higher, or as directed by your provider.  · Your symptoms worsen, or they dont go away within a few days of starting treatment.  · You have new pain in the lower belly or pelvic region.  · You have side effects that bother you or a reaction to the cream or pills youre prescribed.  · You or any partners you have sex with have new symptoms, such as a rash, joint pain, or sores.  Date Last Reviewed: 7/30/2015  © 0295-2148 [employer]. [address] [place] [address]. All rights reserved. This information is not intended as a substitute for professional medical care. Always follow your healthcare professional's instructions.        Vaginal Infection: Understanding the Vaginal Environment  The vagina is a canal. It connects the uterus (womb) to the outside of the body. It is home to many types of bacteria and other tiny organisms. These different bacteria most often stay balanced in number. This keeps the vagina healthy. If the balance changes, it can cause infection.   A healthy environment  Many types of bacteria are present in a healthy vagina. When balanced, they dont cause problems. Small amounts of yeast may also be present without causing problems. The most common type of bacteria in the vagina is lactobacillus. It helps keep the vagina at a low pH. A low pH keeps bad bacteria from taking over.  Normal vaginal discharge  The vagina makes fluid. It is sent out as discharge. This also keeps the vagina healthy. Normal discharge can be clear, white, or yellowish. Most women find that normal discharge varies in amount and color  through the month.  An unhealthy environment  The vaginal environment may get out of balance. This may result in a vaginal infection. There are a few reasons this can happen. The pH may have changed. The amount of one organism, such as yeast, may increase. Or an outside organism may get into the vagina and throw off the balance:  · Bacterial vaginosis (BV). BV is due to an imbalance in the normal bacteria in the vagina. Lactobacillus bacteria decrease. As a result, the numbers of bad bacteria increase.  · Candidiasis (yeast infection). Yeast is a type of fungus. A yeast infection occurs when yeast cells in the vagina increase. They then attack vaginal tissues. A type of yeast called Candida albicans is often involved.  · Trichomoniasis (trich). Trich is a parasite. It is passed from one person to another during sex. Men with trich often dont have any symptoms. In women, it can take weeks or months before symptoms appear.  Date Last Reviewed: 3/1/2017  © 1027-3853 [employer]. [address] [place] [address], [place] [address]. All rights reserved. This information is not intended as a substitute for professional medical care. Always follow your healthcare professional's instructions.

## 2019-02-03 LAB
BACTERIA UR CULT: NO GROWTH
BACTERIA UR CULT: NORMAL

## 2019-02-05 LAB
A VAGINAE DNA VAG QL NAA+PROBE: ABNORMAL SCORE
BVAB2 DNA VAG QL NAA+PROBE: ABNORMAL SCORE
C ALBICANS DNA VAG QL NAA+PROBE: POSITIVE
C GLABRATA DNA VAG QL NAA+PROBE: NEGATIVE
C TRACH RRNA SPEC QL NAA+PROBE: NEGATIVE
MEGA1 DNA VAG QL NAA+PROBE: ABNORMAL SCORE
N GONORRHOEA RRNA SPEC QL NAA+PROBE: NEGATIVE
T VAGINALIS RRNA SPEC QL NAA+PROBE: NEGATIVE

## 2019-02-08 ENCOUNTER — TELEPHONE (OUTPATIENT)
Dept: URGENT CARE | Facility: CLINIC | Age: 64
End: 2019-02-08

## 2019-02-08 NOTE — TELEPHONE ENCOUNTER
Informed pt of test results. Pt had no further questions.  ----- Message from Ulices Hurst MD sent at 2/7/2019  2:53 PM CST -----  Call patient and let know test only showed yeast for which she was treated

## 2019-02-20 ENCOUNTER — TELEPHONE (OUTPATIENT)
Dept: OBSTETRICS AND GYNECOLOGY | Facility: CLINIC | Age: 64
End: 2019-02-20

## 2019-02-20 DIAGNOSIS — Z12.31 VISIT FOR SCREENING MAMMOGRAM: Primary | ICD-10-CM

## 2019-02-20 NOTE — TELEPHONE ENCOUNTER
----- Message from Elba Severino sent at 2/20/2019 11:59 AM CST -----  Contact: Pt  Patient Requesting Order    Order Needed: Mammo Orders    Communication Preference: [phone]    Additional Information: Pt would like to receive a call once the orders are in if possible, so she can know to contact Dolores to schedule.

## 2019-02-26 ENCOUNTER — PROCEDURE VISIT (OUTPATIENT)
Dept: OBSTETRICS AND GYNECOLOGY | Facility: CLINIC | Age: 64
End: 2019-02-26
Attending: OBSTETRICS & GYNECOLOGY
Payer: COMMERCIAL

## 2019-02-26 VITALS
WEIGHT: 188.25 LBS | SYSTOLIC BLOOD PRESSURE: 136 MMHG | HEIGHT: 65 IN | BODY MASS INDEX: 31.36 KG/M2 | DIASTOLIC BLOOD PRESSURE: 84 MMHG

## 2019-02-26 DIAGNOSIS — N95.0 POST-MENOPAUSAL BLEEDING: Primary | ICD-10-CM

## 2019-02-26 PROCEDURE — 88305 TISSUE EXAM BY PATHOLOGIST: CPT | Performed by: PATHOLOGY

## 2019-02-26 PROCEDURE — 58100 BIOPSY OF UTERUS LINING: CPT | Mod: S$GLB,,, | Performed by: OBSTETRICS & GYNECOLOGY

## 2019-02-26 PROCEDURE — 58100 PR BIOPSY OF UTERUS LINING: ICD-10-PCS | Mod: S$GLB,,, | Performed by: OBSTETRICS & GYNECOLOGY

## 2019-02-26 PROCEDURE — 88305 TISSUE EXAM BY PATHOLOGIST: CPT | Mod: 26,,, | Performed by: PATHOLOGY

## 2019-02-26 PROCEDURE — 88305 TISSUE SPECIMEN TO PATHOLOGY, OBSTETRICS/GYNECOLOGY: ICD-10-PCS | Mod: 26,,, | Performed by: PATHOLOGY

## 2019-02-26 NOTE — PROCEDURES
This 63 yr old female is here for and endometrial biopsy.  She had ultrasound done for bleeding on HRT (when she restarted her pellets) and was found to have thickened endometrial stripe of 11mm and is menopausal.     We discussed the risks and benefits of the procedure and she signed her informed consent.  While in dorso lithotomy position and after uterus confirmed to be anteflexed, cervix was cleansed with betadine and the anterior cervix was grasped with a single toothed tenaculum and the sound was easily passed after os finder used to dilate.pipelle passed to 8cm and pipelle was easily passed x2as well and adequate tissue obtained.  All instruments were then removed and pt tolerated the procedure well.  Will follow up on [facility] with results  Specimen sent to pathology

## 2019-03-12 ENCOUNTER — PATIENT MESSAGE (OUTPATIENT)
Dept: OBSTETRICS AND GYNECOLOGY | Facility: CLINIC | Age: 64
End: 2019-03-12

## 2019-03-12 NOTE — TELEPHONE ENCOUNTER
Spoke with pt and will discuss and possible pre op for D&C at next appointment.  Path was hyperplasia without atypia

## 2019-07-17 ENCOUNTER — HOSPITAL ENCOUNTER (OUTPATIENT)
Dept: RADIOLOGY | Facility: HOSPITAL | Age: 64
Discharge: HOME | End: 2019-07-17
Attending: OBSTETRICS & GYNECOLOGY
Payer: COMMERCIAL

## 2019-07-17 ENCOUNTER — OFFICE VISIT (OUTPATIENT)
Dept: INTERNAL MEDICINE | Facility: CLINIC | Age: 64
End: 2019-07-17
Payer: COMMERCIAL

## 2019-07-17 VITALS
TEMPERATURE: 99 F | WEIGHT: 176.56 LBS | BODY MASS INDEX: 29.42 KG/M2 | DIASTOLIC BLOOD PRESSURE: 68 MMHG | HEIGHT: 65 IN | SYSTOLIC BLOOD PRESSURE: 130 MMHG | HEART RATE: 73 BPM | RESPIRATION RATE: 18 BRPM

## 2019-07-17 DIAGNOSIS — Z12.31 VISIT FOR SCREENING MAMMOGRAM: ICD-10-CM

## 2019-07-17 DIAGNOSIS — Z00.00 ANNUAL PHYSICAL EXAM: Primary | ICD-10-CM

## 2019-07-17 DIAGNOSIS — K59.09 CHRONIC CONSTIPATION: ICD-10-CM

## 2019-07-17 PROCEDURE — 77067 SCR MAMMO BI INCL CAD: CPT | Mod: 26,,, | Performed by: RADIOLOGY

## 2019-07-17 PROCEDURE — 99396 PREV VISIT EST AGE 40-64: CPT | Mod: S$GLB,,, | Performed by: INTERNAL MEDICINE

## 2019-07-17 PROCEDURE — 99999 PR PBB SHADOW E&M-EST. PATIENT-LVL III: ICD-10-PCS | Mod: PBBFAC,,, | Performed by: INTERNAL MEDICINE

## 2019-07-17 PROCEDURE — 77063 BREAST TOMOSYNTHESIS BI: CPT | Mod: 26,,, | Performed by: RADIOLOGY

## 2019-07-17 PROCEDURE — 77067 MAMMO DIGITAL SCREENING BILAT WITH TOMOSYNTHESIS_CAD: ICD-10-PCS | Mod: 26,,, | Performed by: RADIOLOGY

## 2019-07-17 PROCEDURE — 77063 MAMMO DIGITAL SCREENING BILAT WITH TOMOSYNTHESIS_CAD: ICD-10-PCS | Mod: 26,,, | Performed by: RADIOLOGY

## 2019-07-17 PROCEDURE — 77067 SCR MAMMO BI INCL CAD: CPT | Mod: TC,PO

## 2019-07-17 PROCEDURE — 99999 PR PBB SHADOW E&M-EST. PATIENT-LVL III: CPT | Mod: PBBFAC,,, | Performed by: INTERNAL MEDICINE

## 2019-07-17 PROCEDURE — 99396 PR PREVENTIVE VISIT,EST,40-64: ICD-10-PCS | Mod: S$GLB,,, | Performed by: INTERNAL MEDICINE

## 2019-07-17 RX ORDER — POLYETHYLENE GLYCOL 3350 17 G/17G
17 POWDER, FOR SOLUTION ORAL DAILY
Qty: 850 G | Refills: 5 | Status: ON HOLD | OUTPATIENT
Start: 2019-07-17 | End: 2019-08-22 | Stop reason: HOSPADM

## 2019-07-17 NOTE — PROGRESS NOTES
Subjective:       Patient ID: Brittany Modi is a 63 y.o. female.    Chief Complaint: Establish Care; Constipation (chronic); and Abdominal Pain    HPI   63 y.o. Female here for annual exam.     Vaccines: Influenza (2018); Tetanus (declined); Shingrix (will get)  Eye exam: pt will schedule  Mammogram: 7/19  Gyn exam: 2018  Colonoscopy: needs    Exercise: no  Diet: regular    Past Medical History:  No date: Arthritis  No date: GERD (gastroesophageal reflux disease)  No date: Heart murmur  Past Surgical History:  No date: abdominal surgery      Comment:  abdominoplasty  No date: APPENDECTOMY      Comment:  2010  No date: COLONOSCOPY  12-21-15: KNEE SURGERY; Right      Comment:  TKR  12/2015: KNEE SURGERY; Right      Comment:  TKR  12/21/2015: REPLACEMENT-KNEE-TOTAL; Right      Comment:  Performed by [facility] at [facility] OR [address] [place] [address]  No date: TUBAL LIGATION      Comment:  age 30's  Social History    Socioeconomic History      Marital status:       Spouse name: Not on file      Number of children: 2      Years of education: Not on file      Highest education level: Not on file    Occupational History      Occupation: RN    Social Needs      Financial resource strain: Not on file      Food insecurity:        Worry: Not on file        Inability: Not on file      Transportation needs:        Medical: Not on file        Non-medical: Not on file    Tobacco Use      Smoking status: Never Smoker      Smokeless tobacco: Never Used      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at [employer]    Substance and Sexual Activity      Alcohol use: No      Drug use: No      Sexual activity: Yes        Partners: Male        Birth control/protection: None    Lifestyle      Physical activity:        Days per week: Not on file        Minutes per session: Not on file      Stress: Not on file    Relationships      Social connections:        Talks on phone: Not on file        Gets together: Not on file        Attends Buddhist service: Not on file         Active member of club or organization: Not on file        Attends meetings of clubs or organizations: Not on file        Relationship status: Not on file    Other Topics      Concerns:        Not on file    ocial History Narrative      ** Merged History Encounter **           Review of patient's allergies indicates:  No Known Allergies  Xenia Modi had no medications administered during this visit.    Review of Systems   Constitutional: Negative for activity change, appetite change, chills, diaphoresis, fatigue, fever and unexpected weight change.   HENT: Negative for congestion, mouth sores, postnasal drip, rhinorrhea, sinus pressure, sneezing, sore throat, trouble swallowing and voice change.    Eyes: Negative for pain, discharge and visual disturbance.   Respiratory: Negative for cough, shortness of breath and wheezing.    Cardiovascular: Negative for chest pain, palpitations and leg swelling.   Gastrointestinal: Negative for abdominal pain, blood in stool, constipation, diarrhea, nausea and vomiting.   Endocrine: Negative for cold intolerance and heat intolerance.   Genitourinary: Negative for difficulty urinating, dysuria, frequency, hematuria and urgency.   Musculoskeletal: Negative for arthralgias and myalgias.   Skin: Negative for rash and wound.   Allergic/Immunologic: Negative for environmental allergies and food allergies.   Neurological: Negative for dizziness, tremors, seizures, syncope, weakness, light-headedness and headaches.   Hematological: Negative for adenopathy. Does not bruise/bleed easily.   Psychiatric/Behavioral: Negative for confusion and sleep disturbance. The patient is not nervous/anxious.        Objective:      Physical Exam   Constitutional: She is oriented to person, place, and time. She appears well-developed and well-nourished. No distress.   HENT:   Head: Normocephalic and atraumatic.   Right Ear: External ear normal.   Left Ear: External ear normal.   Nose: Nose normal.    Mouth/Throat: Oropharynx is clear and moist. No oropharyngeal exudate.   Eyes: Pupils are equal, round, and reactive to light. Conjunctivae and EOM are normal. Right eye exhibits no discharge. Left eye exhibits no discharge. No scleral icterus.   Neck: Neck supple. No JVD present. No thyromegaly present.   Cardiovascular: Normal rate, regular rhythm, normal heart sounds and intact distal pulses.   No murmur heard.  Pulmonary/Chest: Effort normal and breath sounds normal. No respiratory distress. She has no wheezes. She has no rales. She exhibits no tenderness.   Abdominal: Soft. Bowel sounds are normal. She exhibits no distension. There is no tenderness. There is no guarding.   Musculoskeletal: She exhibits no edema.   Lymphadenopathy:     She has no cervical adenopathy.   Neurological: She is alert and oriented to person, place, and time. No cranial nerve deficit. Coordination normal.   Skin: Skin is warm and dry. No rash noted. She is not diaphoretic. No pallor.   Psychiatric: She has a normal mood and affect. Judgment normal.   Nursing note and vitals reviewed.      Assessment:       1. Annual physical exam    2. Chronic constipation        Plan:    1. Blood work ordered       Vaccines: Influenza (2018); Tetanus (declined); Shingrix (will get)       Eye exam: pt will schedule       Mammogram: 7/19       Gyn exam: 2018       Colonoscopy: needs   2. Chronic constipation- pt advised to start daily Miralax       Will consider Linzess if above not effective       Will schedule colonoscopy   3. F/u in 1 yr

## 2019-07-18 ENCOUNTER — TELEPHONE (OUTPATIENT)
Dept: RADIOLOGY | Facility: HOSPITAL | Age: 64
End: 2019-07-18

## 2019-07-19 ENCOUNTER — TELEPHONE (OUTPATIENT)
Dept: RADIOLOGY | Facility: HOSPITAL | Age: 64
End: 2019-07-19

## 2019-07-19 ENCOUNTER — PATIENT MESSAGE (OUTPATIENT)
Dept: OPTOMETRY | Facility: CLINIC | Age: 64
End: 2019-07-19

## 2019-07-19 ENCOUNTER — LAB VISIT (OUTPATIENT)
Dept: LAB | Facility: HOSPITAL | Age: 64
End: 2019-07-19
Attending: INTERNAL MEDICINE
Payer: COMMERCIAL

## 2019-07-19 ENCOUNTER — OFFICE VISIT (OUTPATIENT)
Dept: OPTOMETRY | Facility: CLINIC | Age: 64
End: 2019-07-19
Payer: COMMERCIAL

## 2019-07-19 DIAGNOSIS — H52.4 MYOPIA WITH ASTIGMATISM AND PRESBYOPIA, BILATERAL: ICD-10-CM

## 2019-07-19 DIAGNOSIS — H43.393 VISUAL FLOATERS, BILATERAL: Primary | ICD-10-CM

## 2019-07-19 DIAGNOSIS — H53.19 VITREOUS FLASHES OF BOTH EYES: ICD-10-CM

## 2019-07-19 DIAGNOSIS — H52.203 MYOPIA WITH ASTIGMATISM AND PRESBYOPIA, BILATERAL: ICD-10-CM

## 2019-07-19 DIAGNOSIS — H40.013 OAG (OPEN ANGLE GLAUCOMA) SUSPECT, LOW RISK, BILATERAL: ICD-10-CM

## 2019-07-19 DIAGNOSIS — Z00.00 ANNUAL PHYSICAL EXAM: ICD-10-CM

## 2019-07-19 DIAGNOSIS — H52.13 MYOPIA WITH ASTIGMATISM AND PRESBYOPIA, BILATERAL: ICD-10-CM

## 2019-07-19 DIAGNOSIS — H04.123 DRY EYE SYNDROME OF BOTH EYES: ICD-10-CM

## 2019-07-19 LAB
ALBUMIN SERPL BCP-MCNC: 3.9 G/DL (ref 3.5–5.2)
ALP SERPL-CCNC: 55 U/L (ref 55–135)
ALT SERPL W/O P-5'-P-CCNC: 30 U/L (ref 10–44)
ANION GAP SERPL CALC-SCNC: 8 MMOL/L (ref 8–16)
AST SERPL-CCNC: 38 U/L (ref 10–40)
BASOPHILS # BLD AUTO: 0.03 K/UL (ref 0–0.2)
BASOPHILS NFR BLD: 0.8 % (ref 0–1.9)
BILIRUB SERPL-MCNC: 0.7 MG/DL (ref 0.1–1)
BUN SERPL-MCNC: 10 MG/DL (ref 8–23)
CALCIUM SERPL-MCNC: 9.6 MG/DL (ref 8.7–10.5)
CHLORIDE SERPL-SCNC: 105 MMOL/L (ref 95–110)
CHOLEST SERPL-MCNC: 165 MG/DL (ref 120–199)
CHOLEST/HDLC SERPL: 3.8 {RATIO} (ref 2–5)
CO2 SERPL-SCNC: 27 MMOL/L (ref 23–29)
CREAT SERPL-MCNC: 1 MG/DL (ref 0.5–1.4)
DIFFERENTIAL METHOD: ABNORMAL
EOSINOPHIL # BLD AUTO: 0.1 K/UL (ref 0–0.5)
EOSINOPHIL NFR BLD: 1.3 % (ref 0–8)
ERYTHROCYTE [DISTWIDTH] IN BLOOD BY AUTOMATED COUNT: 12.6 % (ref 11.5–14.5)
EST. GFR  (AFRICAN AMERICAN): >60 ML/MIN/1.73 M^2
EST. GFR  (NON AFRICAN AMERICAN): >60 ML/MIN/1.73 M^2
ESTIMATED AVG GLUCOSE: 94 MG/DL (ref 68–131)
GLUCOSE SERPL-MCNC: 90 MG/DL (ref 70–110)
HBA1C MFR BLD HPLC: 4.9 % (ref 4–5.6)
HCT VFR BLD AUTO: 41.3 % (ref 37–48.5)
HDLC SERPL-MCNC: 44 MG/DL (ref 40–75)
HDLC SERPL: 26.7 % (ref 20–50)
HGB BLD-MCNC: 13.3 G/DL (ref 12–16)
IMM GRANULOCYTES # BLD AUTO: 0.01 K/UL (ref 0–0.04)
IMM GRANULOCYTES NFR BLD AUTO: 0.3 % (ref 0–0.5)
LDLC SERPL CALC-MCNC: 107 MG/DL (ref 63–159)
LYMPHOCYTES # BLD AUTO: 1.9 K/UL (ref 1–4.8)
LYMPHOCYTES NFR BLD: 47.8 % (ref 18–48)
MCH RBC QN AUTO: 32 PG (ref 27–31)
MCHC RBC AUTO-ENTMCNC: 32.2 G/DL (ref 32–36)
MCV RBC AUTO: 100 FL (ref 82–98)
MONOCYTES # BLD AUTO: 0.4 K/UL (ref 0.3–1)
MONOCYTES NFR BLD: 10.6 % (ref 4–15)
NEUTROPHILS # BLD AUTO: 1.6 K/UL (ref 1.8–7.7)
NEUTROPHILS NFR BLD: 39.2 % (ref 38–73)
NONHDLC SERPL-MCNC: 121 MG/DL
NRBC BLD-RTO: 0 /100 WBC
PLATELET # BLD AUTO: 271 K/UL (ref 150–350)
PMV BLD AUTO: 12.4 FL (ref 9.2–12.9)
POTASSIUM SERPL-SCNC: 4.3 MMOL/L (ref 3.5–5.1)
PROT SERPL-MCNC: 7.2 G/DL (ref 6–8.4)
RBC # BLD AUTO: 4.15 M/UL (ref 4–5.4)
SODIUM SERPL-SCNC: 140 MMOL/L (ref 136–145)
TRIGL SERPL-MCNC: 70 MG/DL (ref 30–150)
TSH SERPL DL<=0.005 MIU/L-ACNC: 1.88 UIU/ML (ref 0.4–4)
WBC # BLD AUTO: 3.95 K/UL (ref 3.9–12.7)

## 2019-07-19 PROCEDURE — 80061 LIPID PANEL: CPT

## 2019-07-19 PROCEDURE — 92004 COMPRE OPH EXAM NEW PT 1/>: CPT | Mod: S$GLB,,, | Performed by: OPTOMETRIST

## 2019-07-19 PROCEDURE — 92015 DETERMINE REFRACTIVE STATE: CPT | Mod: S$GLB,,, | Performed by: OPTOMETRIST

## 2019-07-19 PROCEDURE — 99999 PR PBB SHADOW E&M-EST. PATIENT-LVL II: ICD-10-PCS | Mod: PBBFAC,,, | Performed by: OPTOMETRIST

## 2019-07-19 PROCEDURE — 85025 COMPLETE CBC W/AUTO DIFF WBC: CPT

## 2019-07-19 PROCEDURE — 36415 COLL VENOUS BLD VENIPUNCTURE: CPT | Mod: PO

## 2019-07-19 PROCEDURE — 92133 CPTRZD OPH DX IMG PST SGM ON: CPT | Mod: S$GLB,,, | Performed by: OPTOMETRIST

## 2019-07-19 PROCEDURE — 92133 POSTERIOR SEGMENT OCT OPTIC NERVE(OCULAR COHERENCE TOMOGRAPHY) - OU - BOTH EYES: ICD-10-PCS | Mod: S$GLB,,, | Performed by: OPTOMETRIST

## 2019-07-19 PROCEDURE — 99999 PR PBB SHADOW E&M-EST. PATIENT-LVL II: CPT | Mod: PBBFAC,,, | Performed by: OPTOMETRIST

## 2019-07-19 PROCEDURE — 92004 PR EYE EXAM, NEW PATIENT,COMPREHESV: ICD-10-PCS | Mod: S$GLB,,, | Performed by: OPTOMETRIST

## 2019-07-19 PROCEDURE — 84443 ASSAY THYROID STIM HORMONE: CPT

## 2019-07-19 PROCEDURE — 80053 COMPREHEN METABOLIC PANEL: CPT

## 2019-07-19 PROCEDURE — 83036 HEMOGLOBIN GLYCOSYLATED A1C: CPT

## 2019-07-19 PROCEDURE — 92015 PR REFRACTION: ICD-10-PCS | Mod: S$GLB,,, | Performed by: OPTOMETRIST

## 2019-07-19 NOTE — TELEPHONE ENCOUNTER
Spoke with patient and explained mammogram findings.Patient expressed understanding of results. Patient scheduled abnormal mammogram follow up appointment at [facility] on 7/22/2019.

## 2019-07-19 NOTE — PROGRESS NOTES
HPI     THERESA:2 yrs   Glasses? Yes   Contacts? no  H/o eye surgery, injections or laser: no  H/o eye injury: no  Known eye conditions? no  Family h/o eye conditions? Mother, 2xsisters-glaucoma   Eye gtts?no    (+) Flashes for over a year. Not very often (+) Floaters OU ongoing, no   changes, frequent  (-) Mucous   (+) Tearing OU new, within the last two months  (-) Itching (-) Burning   (-) Headaches (-) Eye Pain/discomfort (-) Irritation   (-) Redness (-) Double vision (-) Blurry vision    Diabetic? (-)  A1c?  (Hemoglobin A1C       Date                     Value               Ref Range             Status                05/16/2018               5.0                 4.0 - 5.6 %           Final              Comment:    According to ADA guidelines, hemoglobin A1c <7.0% represents  optimal   control in non-pregnant diabetic patients. Different  metrics may apply to   specific patient populations.   Standards of Medical Care in   Diabetes-2016.  For the purpose of screening for the presence of   diabetes:  <5.7%     Consistent with the absence of diabetes  5.7-6.4%    Consistent with increasing risk for diabetes   (prediabetes)  >or=6.5%    Consistent with diabetes  Currently, no consensus exists for use of   hemoglobin A1c  for diagnosis of diabetes for children.  This Hemoglobin   A1c assay has significant interference with fetal   hemoglobin   (HbF).   The results are invalid for patients with abnormal amounts of   HbF,     including those with known Hereditary Persistence   of Fetal Hemoglobin.   Heterozygous hemoglobin variants (HbAS, HbAC,   HbAD, HbAE, HbA2) do not   significantly interfere with this assay;   however, presence of multiple   variants in a sample may impact the %   interference.         01/29/2018               4.8                 4.0 - 5.6 %           Final              Comment:    According to ADA guidelines, hemoglobin A1c <7.0% represents  optimal   control in non-pregnant diabetic patients.  Different  metrics may apply to   specific patient populations.   Standards of Medical Care in   Diabetes-2016.  For the purpose of screening for the presence of   diabetes:  <5.7%     Consistent with the absence of diabetes  5.7-6.4%    Consistent with increasing risk for diabetes   (prediabetes)  >or=6.5%    Consistent with diabetes  Currently, no consensus exists for use of   hemoglobin A1c  for diagnosis of diabetes for children.  This Hemoglobin   A1c assay has significant interference with fetal   hemoglobin   (HbF).   The results are invalid for patients with abnormal amounts of   HbF,     including those with known Hereditary Persistence   of Fetal Hemoglobin.   Heterozygous hemoglobin variants (HbAS, HbAC,   HbAD, HbAE, HbA2) do not   significantly interfere with this assay;   however, presence of multiple   variants in a sample may impact the %   interference.    ----------)        Last edited by Mark Michel, OD on 7/19/2019 11:39 AM. (History)        ROS     Negative for: Constitutional, Gastrointestinal, Neurological, Skin,   Genitourinary, Musculoskeletal, HENT, Endocrine, Cardiovascular, Eyes,   Respiratory, Psychiatric, Allergic/Imm, Heme/Lymph    Last edited by Mark Michel, OD on 7/19/2019 11:39 AM. (History)        Assessment /Plan     For exam results, see Encounter Report.    Visual floaters, bilateral    Vitreous flashes of both eyes    Dry eye syndrome of both eyes    OAG (open angle glaucoma) suspect, low risk, bilateral  -     Hartmann Visual Field - OU - Extended - Both Eyes; Future  -     Posterior Segment OCT Optic Nerve- Both eyes    Myopia with astigmatism and presbyopia, bilateral      1-2. No e/o h/b/t 360 degrees OU. Monitor for worsening of symptoms or S/Sx of RD.   3. Recommend Systane Ultra or Refresh Optive BID-TID OU to aid with symptoms of dry eyes.  4. (+)FHx- mother, 2 sisters. IOP 13 OD, 16 OS. C/d 0.7 OD, 0.65 OS.   7/19/19 OCT OD WNL, OS borderline TS.    Educated pt on findings. RTC 2-3 weeks IOP/pachy/HVF.   5. SRx released to patient. Patient educated on lens options. Normal ocular health.

## 2019-07-22 ENCOUNTER — HOSPITAL ENCOUNTER (OUTPATIENT)
Dept: RADIOLOGY | Facility: HOSPITAL | Age: 64
Discharge: HOME | End: 2019-07-22
Attending: OBSTETRICS & GYNECOLOGY
Payer: COMMERCIAL

## 2019-07-22 VITALS — WEIGHT: 176 LBS | HEIGHT: 65 IN | BODY MASS INDEX: 29.32 KG/M2

## 2019-07-22 DIAGNOSIS — R92.8 ABNORMAL MAMMOGRAM: ICD-10-CM

## 2019-07-22 PROCEDURE — 76642 US BREAST BILATERAL LIMITED: ICD-10-PCS | Mod: 26,,, | Performed by: RADIOLOGY

## 2019-07-22 PROCEDURE — 76642 ULTRASOUND BREAST LIMITED: CPT | Mod: 26,,, | Performed by: RADIOLOGY

## 2019-07-22 PROCEDURE — 77066 MAMMO DIGITAL DIAGNOSTIC BILAT WITH TOMOSYNTHESIS_CAD: ICD-10-PCS | Mod: 26,,, | Performed by: RADIOLOGY

## 2019-07-22 PROCEDURE — 77062 BREAST TOMOSYNTHESIS BI: CPT | Mod: 26,,, | Performed by: RADIOLOGY

## 2019-07-22 PROCEDURE — 77062 MAMMO DIGITAL DIAGNOSTIC BILAT WITH TOMOSYNTHESIS_CAD: ICD-10-PCS | Mod: 26,,, | Performed by: RADIOLOGY

## 2019-07-22 PROCEDURE — 76642 ULTRASOUND BREAST LIMITED: CPT | Mod: TC,50,PO

## 2019-07-22 PROCEDURE — 77066 DX MAMMO INCL CAD BI: CPT | Mod: TC,PO

## 2019-07-22 PROCEDURE — 77066 DX MAMMO INCL CAD BI: CPT | Mod: 26,,, | Performed by: RADIOLOGY

## 2019-07-26 ENCOUNTER — TELEPHONE (OUTPATIENT)
Dept: ENDOSCOPY | Facility: HOSPITAL | Age: 64
End: 2019-07-26

## 2019-07-26 DIAGNOSIS — Z12.11 SPECIAL SCREENING FOR MALIGNANT NEOPLASMS, COLON: Primary | ICD-10-CM

## 2019-07-26 RX ORDER — POLYETHYLENE GLYCOL 3350, SODIUM SULFATE ANHYDROUS, SODIUM BICARBONATE, SODIUM CHLORIDE, POTASSIUM CHLORIDE 236; 22.74; 6.74; 5.86; 2.97 G/4L; G/4L; G/4L; G/4L; G/4L
4 POWDER, FOR SOLUTION ORAL ONCE
Qty: 4000 ML | Refills: 0 | Status: SHIPPED | OUTPATIENT
Start: 2019-07-26 | End: 2019-07-26

## 2019-07-30 ENCOUNTER — PATIENT MESSAGE (OUTPATIENT)
Dept: OBSTETRICS AND GYNECOLOGY | Facility: CLINIC | Age: 64
End: 2019-07-30

## 2019-08-22 ENCOUNTER — HOSPITAL ENCOUNTER (OUTPATIENT)
Facility: HOSPITAL | Age: 64
Discharge: HOME | End: 2019-08-22
Attending: COLON & RECTAL SURGERY | Admitting: COLON & RECTAL SURGERY
Payer: COMMERCIAL

## 2019-08-22 ENCOUNTER — ANESTHESIA (OUTPATIENT)
Dept: ENDOSCOPY | Facility: HOSPITAL | Age: 64
End: 2019-08-22
Payer: COMMERCIAL

## 2019-08-22 ENCOUNTER — ANESTHESIA EVENT (OUTPATIENT)
Dept: ENDOSCOPY | Facility: HOSPITAL | Age: 64
End: 2019-08-22
Payer: COMMERCIAL

## 2019-08-22 VITALS
HEIGHT: 66 IN | OXYGEN SATURATION: 98 % | TEMPERATURE: 98 F | SYSTOLIC BLOOD PRESSURE: 168 MMHG | WEIGHT: 172 LBS | RESPIRATION RATE: 16 BRPM | HEART RATE: 84 BPM | BODY MASS INDEX: 27.64 KG/M2 | DIASTOLIC BLOOD PRESSURE: 77 MMHG

## 2019-08-22 DIAGNOSIS — Z12.11 ENCOUNTER FOR SCREENING COLONOSCOPY: Primary | ICD-10-CM

## 2019-08-22 PROCEDURE — E9220 PRA ENDO ANESTHESIA: HCPCS | Mod: ,,, | Performed by: NURSE ANESTHETIST, CERTIFIED REGISTERED

## 2019-08-22 PROCEDURE — G0121 COLON CA SCRN NOT HI RSK IND: HCPCS | Performed by: COLON & RECTAL SURGERY

## 2019-08-22 PROCEDURE — 37000009 HC ANESTHESIA EA ADD 15 MINS: Performed by: COLON & RECTAL SURGERY

## 2019-08-22 PROCEDURE — 37000008 HC ANESTHESIA 1ST 15 MINUTES: Performed by: COLON & RECTAL SURGERY

## 2019-08-22 PROCEDURE — 63600175 PHARM REV CODE 636 W HCPCS: Performed by: COLON & RECTAL SURGERY

## 2019-08-22 PROCEDURE — 63600175 PHARM REV CODE 636 W HCPCS: Performed by: NURSE ANESTHETIST, CERTIFIED REGISTERED

## 2019-08-22 PROCEDURE — G0121 COLON CA SCRN NOT HI RSK IND: ICD-10-PCS | Mod: ,,, | Performed by: COLON & RECTAL SURGERY

## 2019-08-22 PROCEDURE — G0121 COLON CA SCRN NOT HI RSK IND: HCPCS | Mod: ,,, | Performed by: COLON & RECTAL SURGERY

## 2019-08-22 PROCEDURE — E9220 PRA ENDO ANESTHESIA: ICD-10-PCS | Mod: ,,, | Performed by: NURSE ANESTHETIST, CERTIFIED REGISTERED

## 2019-08-22 RX ORDER — SODIUM CHLORIDE 9 MG/ML
INJECTION, SOLUTION INTRAVENOUS CONTINUOUS
Status: DISCONTINUED | OUTPATIENT
Start: 2019-08-22 | End: 2019-08-22 | Stop reason: HOSPADM

## 2019-08-22 RX ORDER — LIDOCAINE HCL/PF 100 MG/5ML
SYRINGE (ML) INTRAVENOUS
Status: DISCONTINUED | OUTPATIENT
Start: 2019-08-22 | End: 2019-08-22

## 2019-08-22 RX ORDER — PROPOFOL 10 MG/ML
VIAL (ML) INTRAVENOUS
Status: DISCONTINUED | OUTPATIENT
Start: 2019-08-22 | End: 2019-08-22

## 2019-08-22 RX ORDER — PROPOFOL 10 MG/ML
VIAL (ML) INTRAVENOUS CONTINUOUS PRN
Status: DISCONTINUED | OUTPATIENT
Start: 2019-08-22 | End: 2019-08-22

## 2019-08-22 RX ADMIN — PROPOFOL 80 MG: 10 INJECTION, EMULSION INTRAVENOUS at 02:08

## 2019-08-22 RX ADMIN — SODIUM CHLORIDE: 0.9 INJECTION, SOLUTION INTRAVENOUS at 01:08

## 2019-08-22 RX ADMIN — PROPOFOL 200 MCG/KG/MIN: 10 INJECTION, EMULSION INTRAVENOUS at 02:08

## 2019-08-22 RX ADMIN — LIDOCAINE HYDROCHLORIDE 40 MG: 20 INJECTION, SOLUTION INTRAVENOUS at 02:08

## 2019-08-22 NOTE — PROVATION PATIENT INSTRUCTIONS
Discharge Summary/Instructions after an Endoscopic Procedure  Patient Name: Brittany Modi  Patient MRN: [id number]  Patient YOB: 1955 Thursday, August 22, 2019  Rose Mary Ervin MD  RESTRICTIONS:  During your procedure today, you received medications for sedation.  These   medications may affect your judgment, balance and coordination.  Therefore,   for 24 hours, you have the following restrictions:   - DO NOT drive a car, operate machinery, make legal/financial decisions,   sign important papers or drink alcohol.    ACTIVITY:  Today: no heavy lifting, straining or running due to procedural   sedation/anesthesia.  The following day: return to full activity including work.  DIET:  Eat and drink normally unless instructed otherwise.     TREATMENT FOR COMMON SIDE EFFECTS:  - Mild abdominal pain, nausea, belching, bloating or excessive gas:  rest,   eat lightly and use a heating pad.  - Sore Throat: treat with throat lozenges and/or gargle with warm salt   water.  - Because air was used during the procedure, expelling large amounts of air   from your rectum or belching is normal.  - If a bowel prep was taken, you may not have a bowel movement for 1-3 days.    This is normal.  SYMPTOMS TO WATCH FOR AND REPORT TO YOUR PHYSICIAN:  1. Abdominal pain or bloating, other than gas cramps.  2. Chest pain.  3. Back pain.  4. Signs of infection such as: chills or fever occurring within 24 hours   after the procedure.  5. Rectal bleeding, which would show as bright red, maroon, or black stools.   (A tablespoon of blood from the rectum is not serious, especially if   hemorrhoids are present.)  6. Vomiting.  7. Weakness or dizziness.  GO DIRECTLY TO THE NEAREST EMERGENCY ROOM IF YOU HAVE ANY OF THE FOLLOWING:      Difficulty breathing              Chills and/or fever over 101 F   Persistent vomiting and/or vomiting blood   Severe abdominal pain   Severe chest pain   Black, tarry stools   Bleeding- more than one  tablespoon   Any other symptom or condition that you feel may need urgent attention  Your doctor recommends these additional instructions:  If any biopsies were taken, your doctors clinic will contact you in 1 to 2   weeks with any results.  - Discharge patient to home.   - Resume previous diet.   - Continue present medications.   - Repeat colonoscopy in 10 years for screening purposes.   - Return to referring physician.   - Written discharge instructions were provided to the patient.   - The signs and symptoms of potential delayed complications were discussed   with the patient.   - Patient has a contact number available for emergencies.   - Return to normal activities tomorrow.  For questions, problems or results please call your physician - Rose Mary Ervin MD at Work:  [phone].  [facility], EMERGENCY ROOM PHONE NUMBER: [phone]  IF A COMPLICATION OR EMERGENCY SITUATION ARISES AND YOU ARE UNABLE TO REACH   YOUR PHYSICIAN - GO DIRECTLY TO THE EMERGENCY ROOM.  Rose Mary Ervin MD  8/22/2019 3:35:01 PM  This report has been verified and signed electronically.  [employer]OVATION

## 2019-08-22 NOTE — TRANSFER OF CARE
"Anesthesia Transfer of Care Note    Patient: Brittany Modi    Procedure(s) Performed: Procedure(s) (LRB):  COLONOSCOPY (N/A)    Patient location: PACU    Anesthesia Type: general    Transport from OR: Transported from OR on room air with adequate spontaneous ventilation    Post pain: adequate analgesia    Post assessment: no apparent anesthetic complications and tolerated procedure well    Post vital signs: stable    Level of consciousness: awake, alert and oriented    Nausea/Vomiting: no nausea/vomiting    Complications: none    Transfer of care protocol was followed      Last vitals:   Visit Vitals  BP (!) 171/79 (BP Location: Left arm, Patient Position: Sitting)   Pulse 87   Temp 36.7 °C (98.1 °F) (Temporal)   Resp 18   Ht 5' 5.5" (1.664 m)   Wt 78 kg (172 lb)   SpO2 100%   Breastfeeding? No   BMI 28.19 kg/m²     "

## 2019-08-22 NOTE — DISCHARGE INSTRUCTIONS
Colonoscopy     A camera attached to a flexible tube with a viewing lens is used to take video pictures.     Colonoscopy is a test to view the inside of your lower digestive tract (colon and rectum). Sometimes it can show the last part of the small intestine (ileum). During the test, small pieces of tissue may be removed for testing. This is called a biopsy. Small growths, such as polyps, may also be removed.   Why is colonoscopy done?  The test is done to help look for colon cancer. And it can help find the source of abdominal pain, bleeding, and changes in bowel habits. It may be needed once a year, depending on factors such as your:  · Age  · Health history  · Family health history  · Symptoms  · Results from any prior colonoscopy  Risks and possible complications  These include:  · Bleeding               · A puncture or tear in the colon   · Risks of anesthesia  · A cancer lesion not being seen  Getting ready   To prepare for the test:  · Talk with your healthcare provider about the risks of the test (see below). Also ask your healthcare provider about alternatives to the test.  · Tell your healthcare provider about any medicines you take. Also tell him or her about any health conditions you may have.  · Make sure your rectum and colon are empty for the test. Follow the diet and bowel prep instructions exactly. If you dont, the test may need to be rescheduled.  · Plan for a friend or family member to drive you home after the test.     Colonoscopy provides an inside view of the entire colon.     You may discuss the results with your doctor right away or at a future visit.  During the test   The test is usually done in the hospital on an outpatient basis. This means you go home the same day. The procedure takes about 30 minutes. During that time:  · You are given relaxing (sedating) medicine through an IV line. You may be drowsy, or fully asleep.  · The healthcare provider will first give you a physical exam to  check for anal and rectal problems.  · Then the anus is lubricated and the scope inserted.  · If you are awake, you may have a feeling similar to needing to have a bowel movement. You may also feel pressure as air is pumped into the colon. Its OK to pass gas during the procedure.  · Biopsy, polyp removal, or other treatments may be done during the test.  After the test   You may have gas right after the test. It can help to try to pass it to help prevent later bloating. Your healthcare provider may discuss the results with you right away. Or you may need to schedule a follow-up visit to talk about the results. After the test, you can go back to your normal eating and other activities. You may be tired from the sedation and need to rest for a few hours.  Date Last Reviewed: 11/1/2016 © 2000-2017 [employer] [address] [place] [address], [place] [address]. All rights reserved. This information is not intended as a substitute for professional medical care. Always follow your healthcare professional's instructions.

## 2019-08-22 NOTE — ANESTHESIA PREPROCEDURE EVALUATION
08/22/2019  Brittany Modi is a 64 y.o., female.  Past Medical History:   Diagnosis Date    Arthritis     GERD (gastroesophageal reflux disease)     Heart murmur      Past Surgical History:   Procedure Laterality Date    abdominal surgery      abdominoplasty    APPENDECTOMY      2010    COLONOSCOPY      KNEE SURGERY Right 12-21-15    TKR    KNEE SURGERY Right 12/2015    TKR    REPLACEMENT-KNEE-TOTAL Right 12/21/2015    [facility]    TUBAL LIGATION      age 30's     Patient Active Problem List    Diagnosis Date Noted    OAG (open angle glaucoma) suspect, low risk, bilateral 07/19/2019    Status post total right knee replacement 12/21/2015 01/05/2016    GERD (gastroesophageal reflux disease) 12/15/2015    H/O angina pectoris 12/15/2015           Anesthesia Evaluation    I have reviewed the Patient Summary Reports.    I have reviewed the Nursing Notes.   I have reviewed the Medications.     Review of Systems  Anesthesia Hx:  No problems with previous Anesthesia    Hematology/Oncology:  Hematology Normal   Oncology Normal     EENT/Dental:EENT/Dental Normal   Cardiovascular:  Cardiovascular Normal     Pulmonary:  Pulmonary Normal    Renal/:  Renal/ Normal     Hepatic/GI:   GERD    Musculoskeletal:  Musculoskeletal Normal    Neurological:  Neurology Normal    Endocrine:  Endocrine Normal    Dermatological:  Skin Normal    Psych:  Psychiatric Normal           Physical Exam  General:  Well nourished    Airway/Jaw/Neck:  Airway Findings: Mouth Opening: Normal Tongue: Normal  General Airway Assessment: Adult  Mallampati: I  TM Distance: Normal, at least 6 cm  Jaw/Neck Findings:     Neck ROM: Normal ROM     Eyes/Ears/Nose:  Eyes/Ears/Nose Findings:    Dental:  Dental Findings: In tact        Mental Status:  Mental Status Findings:  Cooperative, Alert and  Oriented         Anesthesia Plan  Type of Anesthesia, risks & benefits discussed:  Anesthesia Type:  general  Patient's Preference:   Intra-op Monitoring Plan: standard ASA monitors  Intra-op Monitoring Plan Comments:   Post Op Pain Control Plan:   Post Op Pain Control Plan Comments:   Induction:   IV  Beta Blocker:  Patient is not currently on a Beta-Blocker (No further documentation required).       Informed Consent: Patient understands risks and agrees with Anesthesia plan.  Questions answered.   ASA Score: 2     Day of Surgery Review of History & Physical: I have interviewed and examined the patient. I have reviewed the patient's H&P dated:  There are no significant changes.          Ready For Surgery From Anesthesia Perspective.

## 2019-08-22 NOTE — ANESTHESIA POSTPROCEDURE EVALUATION
Anesthesia Post Evaluation    Patient: Brittany Modi    Procedure(s) Performed: Procedure(s) (LRB):  COLONOSCOPY (N/A)    Final Anesthesia Type: general  Patient location during evaluation: GI PACU  Patient participation: Yes- Able to Participate  Level of consciousness: awake and alert  Post-procedure vital signs: reviewed and stable  Pain management: adequate  Airway patency: patent  PONV status at discharge: No PONV  Anesthetic complications: no      Cardiovascular status: blood pressure returned to baseline  Respiratory status: unassisted  Hydration status: euvolemic  Follow-up not needed.          Vitals Value Taken Time   /73 8/22/2019  3:55 PM   Temp 36.7 °C (98.1 °F) 8/22/2019  3:40 PM   Pulse 72 8/22/2019  3:55 PM   Resp 16 8/22/2019  3:55 PM   SpO2 97 % 8/22/2019  3:40 PM         No case tracking events are documented in the log.      Pain/Heriberto Score: Heriberto Score: 7 (8/22/2019  3:40 PM)

## 2019-08-22 NOTE — H&P
COLONOSCOPY HISTORY AND PHYSICAL EXAM    Procedure : Colonoscopy      INDICATIONS: asymptomatic screening exam    Family Hx of CRC: none    Last Colonoscopy:  ~10 years ago  Findings: no polyps per patient       Past Medical History:   Diagnosis Date    Arthritis     GERD (gastroesophageal reflux disease)     Heart murmur      Sedation Problems: NO  Family History   Problem Relation Age of Onset    Diabetes Mother     Hypertension Mother     Hypothyroidism Mother     Asthma Father     Hypertension Sister     Cancer Sister         breast    Breast cancer Sister     Hypertension Brother     Hypertension Sister     Hypertension Sister     Hypertension Brother     Hypertension Son     Colon cancer Neg Hx     Ovarian cancer Neg Hx      Fam Hx of Sedation Problems: NO  Social History     Socioeconomic History    Marital status:      Spouse name: Not on file    Number of children: 2    Years of education: Not on file    Highest education level: Not on file   Occupational History    Occupation: RN   Social Needs    Financial resource strain: Not on file    Food insecurity:     Worry: Not on file     Inability: Not on file    Transportation needs:     Medical: Not on file     Non-medical: Not on file   Tobacco Use    Smoking status: Never Smoker    Smokeless tobacco: Never Used    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at [employer]   Substance and Sexual Activity    Alcohol use: No    Drug use: No    Sexual activity: Yes     Partners: Male     Birth control/protection: None   Lifestyle    Physical activity:     Days per week: Not on file     Minutes per session: Not on file    Stress: Not on file   Relationships    Social connections:     Talks on phone: Not on file     Gets together: Not on file     Attends Anabaptist service: Not on file     Active member of club or organization: Not on file     Attends meetings of clubs or organizations: Not on file     Relationship status: Not on file   Other Topics Concern     Not on file   Social History Narrative    ** Merged History Encounter **            Review of Systems - Negative except   Respiratory ROS: no dyspnea  Cardiovascular ROS: no exertional chest pain  Gastrointestinal ROS: NO abdominal discomfort,  NO rectal bleeding  Musculoskeletal ROS: no muscular pain  Neurological ROS: no recent stroke    Physical Exam:  Breastfeeding? No   General: no distress  Head: normocephalic  Mallampati Score   Neck: supple, symmetrical, trachea midline  Lungs:  clear to auscultation bilaterally and normal respiratory effort  Heart: regular rate and rhythm and no murmur  Abdomen: soft, non-tender non-distented; bowel sounds normal; no masses,  no organomegaly  Extremities: no cyanosis or edema, or clubbing    ASA:  II    PLAN  COLONOSCOPY.    SedationPlan :MAC    The details of the procedure, the possible need for biopsy or polypectomy and the potential risks including bleeding, perforation, missed polyps were discussed in detail.

## 2019-08-29 ENCOUNTER — TELEPHONE (OUTPATIENT)
Dept: ENDOSCOPY | Facility: HOSPITAL | Age: 64
End: 2019-08-29

## 2019-09-03 ENCOUNTER — TELEPHONE (OUTPATIENT)
Dept: SURGERY | Facility: CLINIC | Age: 64
End: 2019-09-03

## 2019-09-03 NOTE — TELEPHONE ENCOUNTER
----- Message from Yun Mancia sent at 9/3/2019 11:42 AM CDT -----  Contact: pt   Type:  Needs Medical Advice    Who Called: pt called stated she had a colonoscopy on 8/22/19,   Symptoms (please be specific): she stated she have not had a bowel movement since. She did take medication for this and it was watery and she when she eat she have abdominal pain and she is very bloated    How long has patient had these symptoms:  8/22/19  Pharmacy name and phone #:    [id number] - [place], [place] - [address] AT [address] [facility]  [facility]  [facility] [address]  Phone: [phone] Fax: [phone]    Would the patient rather a call back or a response via [facility]? Call   Best Call Back Number: [phone]  Additional Information:

## 2019-10-04 ENCOUNTER — TELEPHONE (OUTPATIENT)
Dept: OBSTETRICS AND GYNECOLOGY | Facility: CLINIC | Age: 64
End: 2019-10-04

## 2019-10-04 NOTE — TELEPHONE ENCOUNTER
----- Message from Cecilia Hoffman sent at 10/4/2019 12:34 PM CDT -----  Contact: KERMIT LICONA [[id number]]  Name of Who is Calling: KERMIT LICONA [[id number]]    What is the request in detail: patient would like to schedule appt for irregular bleeding (post-menstrual). Please call to schedule     Can the clinic reply by [facility]: no      What Number to Call Back if not in [facility]: [phone]

## 2019-10-09 ENCOUNTER — TELEPHONE (OUTPATIENT)
Dept: OBSTETRICS AND GYNECOLOGY | Facility: CLINIC | Age: 64
End: 2019-10-09

## 2019-10-09 DIAGNOSIS — N95.1 MENOPAUSAL SYMPTOMS: Primary | ICD-10-CM

## 2019-10-09 NOTE — TELEPHONE ENCOUNTER
----- Message from Rose Mary Quinones sent at 10/9/2019  1:47 PM CDT -----  Contact: KERMIT LICONA   Patient Returning Call      Who Called: KERMIT LICONA       Who Left Message for Patient: Judith Melo      Does the patient know what this is regarding?: What is the request in detail: patient would like to schedule appt for irregular bleeding (post-menstrual). Please call to schedule       Best Call Back Number: [phone]      Additional Information: n/a

## 2019-10-11 ENCOUNTER — LAB VISIT (OUTPATIENT)
Dept: LAB | Facility: HOSPITAL | Age: 64
End: 2019-10-11
Attending: OBSTETRICS & GYNECOLOGY
Payer: COMMERCIAL

## 2019-10-11 DIAGNOSIS — N95.1 MENOPAUSAL SYMPTOMS: ICD-10-CM

## 2019-10-11 LAB — ESTRADIOL SERPL-MCNC: 60 PG/ML

## 2019-10-11 PROCEDURE — 82670 ASSAY OF TOTAL ESTRADIOL: CPT

## 2019-10-11 PROCEDURE — 84402 ASSAY OF FREE TESTOSTERONE: CPT

## 2019-10-11 PROCEDURE — 36415 COLL VENOUS BLD VENIPUNCTURE: CPT | Mod: PO

## 2019-10-13 ENCOUNTER — PATIENT OUTREACH (OUTPATIENT)
Dept: ADMINISTRATIVE | Facility: OTHER | Age: 64
End: 2019-10-13

## 2019-10-14 LAB — TESTOST FREE SERPL-MCNC: 1 PG/ML

## 2019-10-15 ENCOUNTER — OFFICE VISIT (OUTPATIENT)
Dept: OBSTETRICS AND GYNECOLOGY | Facility: CLINIC | Age: 64
End: 2019-10-15
Attending: OBSTETRICS & GYNECOLOGY
Payer: COMMERCIAL

## 2019-10-15 ENCOUNTER — TELEPHONE (OUTPATIENT)
Dept: OBSTETRICS AND GYNECOLOGY | Facility: CLINIC | Age: 64
End: 2019-10-15

## 2019-10-15 VITALS
WEIGHT: 170.94 LBS | HEIGHT: 66 IN | DIASTOLIC BLOOD PRESSURE: 76 MMHG | SYSTOLIC BLOOD PRESSURE: 124 MMHG | BODY MASS INDEX: 27.47 KG/M2

## 2019-10-15 DIAGNOSIS — N95.0 POST-MENOPAUSAL BLEEDING: ICD-10-CM

## 2019-10-15 DIAGNOSIS — Z79.890 HORMONE REPLACEMENT THERAPY (HRT): ICD-10-CM

## 2019-10-15 DIAGNOSIS — N88.2 STENOTIC CERVICAL OS: ICD-10-CM

## 2019-10-15 DIAGNOSIS — N95.0 POSTMENOPAUSAL BLEEDING: Primary | ICD-10-CM

## 2019-10-15 PROCEDURE — 99214 PR OFFICE/OUTPT VISIT, EST, LEVL IV, 30-39 MIN: ICD-10-PCS | Mod: 25,S$GLB,, | Performed by: OBSTETRICS & GYNECOLOGY

## 2019-10-15 PROCEDURE — 3008F PR BODY MASS INDEX (BMI) DOCUMENTED: ICD-10-PCS | Mod: CPTII,S$GLB,, | Performed by: OBSTETRICS & GYNECOLOGY

## 2019-10-15 PROCEDURE — 96372 PR INJECTION,THERAP/PROPH/DIAG2ST, IM OR SUBCUT: ICD-10-PCS | Mod: S$GLB,,, | Performed by: OBSTETRICS & GYNECOLOGY

## 2019-10-15 PROCEDURE — 3008F BODY MASS INDEX DOCD: CPT | Mod: CPTII,S$GLB,, | Performed by: OBSTETRICS & GYNECOLOGY

## 2019-10-15 PROCEDURE — 99214 OFFICE O/P EST MOD 30 MIN: CPT | Mod: 25,S$GLB,, | Performed by: OBSTETRICS & GYNECOLOGY

## 2019-10-15 PROCEDURE — 96372 THER/PROPH/DIAG INJ SC/IM: CPT | Mod: S$GLB,,, | Performed by: OBSTETRICS & GYNECOLOGY

## 2019-10-15 RX ORDER — TESTOSTERONE CYPIONATE 200 MG/ML
50 INJECTION, SOLUTION INTRAMUSCULAR
Status: DISCONTINUED | OUTPATIENT
Start: 2019-10-15 | End: 2019-12-03 | Stop reason: CLARIF

## 2019-10-15 RX ORDER — MUPIROCIN 20 MG/G
OINTMENT TOPICAL
Status: CANCELLED | OUTPATIENT
Start: 2019-10-15

## 2019-10-15 RX ORDER — PROGESTERONE 100 MG/1
100 CAPSULE ORAL NIGHTLY
Qty: 90 CAPSULE | Refills: 3 | Status: SHIPPED | OUTPATIENT
Start: 2019-10-15 | End: 2019-12-03 | Stop reason: CLARIF

## 2019-10-15 RX ORDER — MISOPROSTOL 200 UG/1
200 TABLET ORAL EVERY 6 HOURS
Qty: 4 TABLET | Refills: 0 | Status: SHIPPED | OUTPATIENT
Start: 2019-10-15 | End: 2019-12-03 | Stop reason: CLARIF

## 2019-10-15 RX ADMIN — TESTOSTERONE CYPIONATE 50 MG: 200 INJECTION, SOLUTION INTRAMUSCULAR at 03:10

## 2019-10-15 NOTE — PROGRESS NOTES
Subjective:       Patient ID: Brittany Modi is a 64 y.o. female.    Chief Complaint:  discuss bleeding      History of Present Illness  HPI  This 64 yr old P2 female is here for follow up on bleeding on HRT. She had pellets placed in 10/18 100 of estrogen and 150 of testosterone and po progesterone.  We did ultrasound when she began bleeding and had stripe of 11mm.  Her EMBX in Feb showed hyperplasia without atypia.  Her estradiol last week was 60.  She has not been taking the prometrium.  We discussed and will do a D&C hysteroscope with pre treatment of cytotec.  See pre op for details.  She had appendectomy in     GYN & OB History  No LMP recorded. Patient is postmenopausal.   Date of Last Pap: No result found    OB History    Para Term  AB Living   2 2 2     2   SAB TAB Ectopic Multiple Live Births           2      # Outcome Date GA Lbr Alejandro/2nd Weight Sex Delivery Anes PTL Lv   2 Term     M Vag-Spont   JOB   1 Term     F Vag-Spont   JOB       Review of Systems  Review of Systems   Constitutional: Negative for chills and fever.   Respiratory: Negative for shortness of breath.    Cardiovascular: Negative for chest pain.   Gastrointestinal: Negative for abdominal pain, nausea and vomiting.   Genitourinary: Negative for difficulty urinating, dyspareunia, genital sores, menstrual problem, pelvic pain, vaginal bleeding, vaginal discharge and vaginal pain.   Skin: Negative for wound.   Hematological: Negative for adenopathy.           Objective:   Physical Exam       Assessment:        1. Post-menopausal bleeding    2. Hormone replacement therapy (HRT)    3. Stenotic cervical os               Plan:      Pre oped today for D&C

## 2019-10-15 NOTE — LETTER
October 15, 2019      [address]  [address], [address]  [facility] [address]  Phone: [phone]  Fax: [phone]       Patient: Brittany Modi   YOB: 1955  Date of Visit: 10/15/2019    To Whom It May Concern:    Kristie Modi  was at [facility] on 10/15/2019. She may return to work on 10/15/2019 with no restrictions. If you have any questions or concerns, or if I can be of further assistance, please do not hesitate to contact me.    Sincerely,          Geri Azar MD

## 2019-10-15 NOTE — PROGRESS NOTES
Here for  hormone therapy injection, no complaints at this time , Injection given as ordered, tolerated well, no report of pain prior to or after injection. Return to clinic as scheduled.     Site - RB    Testosterone 50  mg      Clinic Supplied Medication

## 2019-10-15 NOTE — H&P
64 y.o.  presents for pre-op H&P for D&C hysteroscope for post menopausal bleed on HRT.  She last received pellets with me 10/2018 and began spotting in 2019.  Ultrasound showed stripe of 11 mm.  EMBx showed hyperplasia with out atypia.  We disucssed progesterone again and the fact that she would need D&C.  She is currently not spotting , just on and off.  Her estradiol level is 60  She has had anesthia in the past with arthoscopy.  She had appendectomy in  She has no significant past medical history.  No LMP recorded. Patient is postmenopausal..    Past Medical History:   Diagnosis Date    Arthritis     GERD (gastroesophageal reflux disease)     Heart murmur      Past Surgical History:   Procedure Laterality Date    abdominal surgery      abdominoplasty    APPENDECTOMY          COLONOSCOPY      COLONOSCOPY N/A 2019    Procedure: COLONOSCOPY;  Surgeon: Rose Mary Ervin MD;  Location: [facility] ([address] [place] [address]);  Service: Endoscopy;  Laterality: N/A;  no PM prep    KNEE SURGERY Right 12-21-15    TKR    KNEE SURGERY Right 2015    TKR    TUBAL LIGATION      age 30's     Family History   Problem Relation Age of Onset    Diabetes Mother     Hypertension Mother     Hypothyroidism Mother     Asthma Father     Hypertension Sister     Cancer Sister         breast    Breast cancer Sister     Hypertension Brother     Hypertension Sister     Hypertension Sister     Hypertension Brother     Hypertension Son     Colon cancer Neg Hx     Ovarian cancer Neg Hx      Review of patient's allergies indicates:  No Known Allergies    Current Outpatient Medications:     cholecalciferol, vitamin D3, (VITAMIN D3) 5,000 unit Tab, Take 5,000 Units by mouth once daily., Disp: , Rfl:     fish oil-omega-3 fatty acids 300-1,000 mg capsule, Take 2 g by mouth once daily., Disp: , Rfl:     multivitamin capsule, Take 1 capsule by mouth once daily., Disp: , Rfl:     miSOPROStol (CYTOTEC) 200 MCG Tab,  Take 1 tablet (200 mcg total) by mouth every 6 (six) hours. for 1 day, Disp: 4 tablet, Rfl: 0    progesterone (PROMETRIUM) 100 MG capsule, Take 1 capsule (100 mg total) by mouth nightly., Disp: 90 capsule, Rfl: 3  Social History     Socioeconomic History    Marital status:      Spouse name: Not on file    Number of children: 2    Years of education: Not on file    Highest education level: Not on file   Occupational History    Occupation: RN   Social Needs    Financial resource strain: Not on file    Food insecurity:     Worry: Not on file     Inability: Not on file    Transportation needs:     Medical: Not on file     Non-medical: Not on file   Tobacco Use    Smoking status: Never Smoker    Smokeless tobacco: Never Used    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at [employer]   Substance and Sexual Activity    Alcohol use: No    Drug use: No    Sexual activity: Yes     Partners: Male     Birth control/protection: None   Lifestyle    Physical activity:     Days per week: Not on file     Minutes per session: Not on file    Stress: Not on file   Relationships    Social connections:     Talks on phone: Not on file     Gets together: Not on file     Attends Congregational service: Not on file     Active member of club or organization: Not on file     Attends meetings of clubs or organizations: Not on file     Relationship status: Not on file   Other Topics Concern    Not on file   Social History Narrative    ** Merged History Encounter **              Last pap pending  Last pathology 02/2019  Simple hyperplasia with out atypia  Last ultrasound 12/2018   Zainab Woodruff MD 12/7/2018       Narrative     EXAMINATION:  US PELVIS COMP WITH TRANSVAG NON-OB (XPD)    CLINICAL HISTORY:  Postmenopausal bleeding    TECHNIQUE:  Transabdominal sonography of the pelvis was performed, followed by transvaginal sonography to better evaluate the uterus and ovaries.    COMPARISON:  None.    FINDINGS:  The uterus is mildly prominent in size and  measures 9 x 5.5 x 6.8 cm.  The endometrial stripe is thickened and measures 12mm.  Within the endometrium there is a somewhat subtle hypoechoic region with some internal vascularity.  It measures 2 x 0.8 x 1.2 cm.  Additionally, there are multiple uterine fibroids.  Along the posterior body of the uterus is a 2 x 1.8 x 2 cm submucosal fibroid.  Additional intramural fibroids measure 2.2, 2.1 and 1.7 cm.    The ovaries are normal in size and appearance.    The right ovary measures 1 x 2.1 x 1 cm.   The left ovary measures 1.9 x 1.7 x 0.7cm.    There is appropriate flow in the ovaries.      Impression       The endometrial stripe is thickened in this postmenopausal woman with bleeding, measuring 12 mm.  There is a subtle more focal hypoechoic region within the endometrium containing internal flow and measuring up to 2 cm maximally.  This may represent a polyp or fibroid.  Further evaluation recommended.    Uterine fibroids.         Vitals:    10/15/19 1330   BP: 124/76     General Appearance: Alert, appropriate appearance for age. No acute distress, Chest/Respiratory Exam: Normal.   Cardiovascular Exam: regular rate and rhythm, S1, S2 normal, no murmur, click, rub or gallop and regular rate and rhythm   Gastrointestinal Exam: soft, non-tender; bowel sounds normal; no masses,  no organomegaly  Pelvic Exam Female: Exam deferred.   See last note.  Psychiatric Exam: Alert and oriented, appropriate affect.    Assessment: post menopausal bleeding    Plan:D&C hysteroscope and make sure she is on progeseterone  I have discussed the risks, benefits, indications, and alternatives of the procedure in detail.  The patient verbalizes her understanding.  All questions answered.  Consents signed.  The patient agrees to proceed to proceed as planned.

## 2019-10-21 ENCOUNTER — HOSPITAL ENCOUNTER (OUTPATIENT)
Dept: PREADMISSION TESTING | Facility: OTHER | Age: 64
Discharge: HOME | End: 2019-10-21
Attending: OBSTETRICS & GYNECOLOGY
Payer: COMMERCIAL

## 2019-10-21 VITALS
TEMPERATURE: 98 F | SYSTOLIC BLOOD PRESSURE: 148 MMHG | BODY MASS INDEX: 27.49 KG/M2 | OXYGEN SATURATION: 97 % | HEART RATE: 70 BPM | RESPIRATION RATE: 16 BRPM | HEIGHT: 65 IN | DIASTOLIC BLOOD PRESSURE: 71 MMHG | WEIGHT: 165 LBS

## 2019-10-21 DIAGNOSIS — N95.0 POST-MENOPAUSAL BLEEDING: ICD-10-CM

## 2019-10-21 DIAGNOSIS — Z01.818 PRE-OP TESTING: ICD-10-CM

## 2019-10-21 LAB
ABO + RH BLD: NORMAL
BLD GP AB SCN CELLS X3 SERPL QL: NORMAL

## 2019-10-21 PROCEDURE — 93010 EKG 12-LEAD: ICD-10-PCS | Mod: ,,, | Performed by: INTERNAL MEDICINE

## 2019-10-21 PROCEDURE — 86901 BLOOD TYPING SEROLOGIC RH(D): CPT

## 2019-10-21 PROCEDURE — 93005 ELECTROCARDIOGRAM TRACING: CPT

## 2019-10-21 PROCEDURE — 36415 COLL VENOUS BLD VENIPUNCTURE: CPT

## 2019-10-21 PROCEDURE — 93010 ELECTROCARDIOGRAM REPORT: CPT | Mod: ,,, | Performed by: INTERNAL MEDICINE

## 2019-10-21 RX ORDER — ACETAMINOPHEN 500 MG
1000 TABLET ORAL
Status: CANCELLED | OUTPATIENT
Start: 2019-10-21 | End: 2019-10-21

## 2019-10-21 RX ORDER — PREGABALIN 75 MG/1
75 CAPSULE ORAL ONCE
Status: CANCELLED | OUTPATIENT
Start: 2019-10-21 | End: 2019-10-21

## 2019-10-21 RX ORDER — SODIUM CHLORIDE, SODIUM LACTATE, POTASSIUM CHLORIDE, CALCIUM CHLORIDE 600; 310; 30; 20 MG/100ML; MG/100ML; MG/100ML; MG/100ML
INJECTION, SOLUTION INTRAVENOUS CONTINUOUS
Status: CANCELLED | OUTPATIENT
Start: 2019-10-21

## 2019-10-21 RX ORDER — LIDOCAINE HYDROCHLORIDE 10 MG/ML
0.5 INJECTION, SOLUTION EPIDURAL; INFILTRATION; INTRACAUDAL; PERINEURAL ONCE
Status: CANCELLED | OUTPATIENT
Start: 2019-10-21 | End: 2019-10-21

## 2019-10-21 NOTE — DISCHARGE INSTRUCTIONS
PRE-ADMIT TESTING -  [phone]    [address]  [place]          Your surgery has been scheduled at [facility]. We are pleased to have the opportunity to serve you. For Further Information please call [phone].    On the day of surgery please report to the Information Desk on the 1st floor.    · CONTACT YOUR PHYSICIAN'S OFFICE THE DAY PRIOR TO YOUR SURGERY TO OBTAIN YOUR ARRIVAL TIME.     · The evening before surgery do not eat anything after 9 p.m. ( this includes hard candy, chewing gum and mints).  You may only have GATORADE, POWERADE AND WATER  from 9 p.m. until you leave your home.   DO NOT DRINK ANY LIQUIDS ON THE WAY TO THE HOSPITAL.      SPECIAL MEDICATION INSTRUCTIONS: TAKE medications checked off by the Anesthesiologist on your Medication List.    Angiogram Patients: Take medications as instructed by your physician, including aspirin.     Surgery Patients:    If you take ASPIRIN - Your PHYSICIAN/SURGEON will need to inform you IF/OR when you need to stop taking aspirin prior to your surgery.     Do Not take any medications containing IBUPROFEN.  Do Not Wear any make-up or dark nail polish   (especially eye make-up) to surgery. If you come to surgery with makeup on you will be required to remove the makeup or nail polish.    Do not shave your surgical area at least 5 days prior to your surgery. The surgical prep will be performed at the hospital according to Infection Control regulations.    Leave all valuables at home.   Do Not wear any jewelry or watches, including any metal in body piercings. Jewelry must be removed prior to coming to the hospital.  There is a possibility that rings that are unable to be removed may be cut off if they are on the surgical extremity.    Contact Lens must be removed before surgery. Either do not wear the contact lens or bring a case and solution for storage.  Please bring a container for eyeglasses or dentures as required.  Bring  any paperwork your physician has provided, such as consent forms,  history and physicals, doctor's orders, etc.   Bring comfortable clothes that are loose fitting to wear upon discharge. Take into consideration the type of surgery being performed.  Maintain your diet as advised per your physician the day prior to surgery.      Adequate rest the night before surgery is advised.   Park in the Parking lot behind the hospital or in the [facility] across the street from the parking lot. Parking is complimentary.  If you will be discharged the same day as your procedure, please arrange for a responsible adult to drive you home or to accompany you if traveling by taxi.   YOU WILL NOT BE PERMITTED TO DRIVE OR TO LEAVE THE HOSPITAL ALONE AFTER SURGERY.   It is strongly recommended that you arrange for someone to remain with you for the first 24 hrs following your surgery.    The Surgeon will speak to your family/visitor after your surgery regarding the outcome of your surgery and post op care.  The Surgeon may speak to you after your surgery, but there is a possibility you may not remember the details.  Please check with your family members regarding the conversation with the Surgeon.    We strongly recommend whoever is bringing you home be present for discharge instructions.  This will ensure a thorough understanding for your post op home care.      Thank you for your cooperation.  The Staff of [facility].                Bathing Instructions with Hibiclens     Shower the evening before and morning of your procedure with Hibiclens:   Wash your face with water and your regular face wash/soap   Apply Hibiclens directly on your skin or on a wet washcloth and wash gently. When showering: Move away from the shower stream when applying Hibiclens to avoid rinsing off too soon.   Rinse thoroughly with warm water   Do not dilute Hibiclens         Dry off as usual, do not use any deodorant,  powder, body lotions, perfume, after shave or cologne.

## 2019-10-22 ENCOUNTER — TELEPHONE (OUTPATIENT)
Dept: OBSTETRICS AND GYNECOLOGY | Facility: CLINIC | Age: 64
End: 2019-10-22

## 2019-10-22 NOTE — TELEPHONE ENCOUNTER
----- Message from Poly Rodríguez sent at 10/22/2019  9:44 AM CDT -----  Contact: KERMIT LICONA [[id number]]  Name of Who is Calling: KERMIT LICONA [[id number]]    What is the request in detail: Would like to speak with staff in regards to upcoming procedure and obtaining excuse for work from 11/1 to 11/2 . Please contact to further discuss and advise      Can the clinic reply by [facility]: no    What Number to Call Back [facility] not [facility]: [phone]

## 2019-10-23 ENCOUNTER — TELEPHONE (OUTPATIENT)
Dept: OBSTETRICS AND GYNECOLOGY | Facility: CLINIC | Age: 64
End: 2019-10-23

## 2019-10-23 ENCOUNTER — PATIENT MESSAGE (OUTPATIENT)
Dept: SURGERY | Facility: OTHER | Age: 64
End: 2019-10-23

## 2019-10-23 NOTE — TELEPHONE ENCOUNTER
----- Message from Fany Sorto sent at 10/23/2019  9:30 AM CDT -----  Contact: pt    Name of Who is Calling:KERMIT LICONA [[id number]]    What is the request in detail: patient would like a call back regarding like information stating she will be having a procedure on Oct 31,2019 for work and a return to work form for November 4 , 2019 to be fax : [phone] to patient employer . Patient state she will need this information sent on today . Please contact to further discuss and advise    Can the clinic reply by [facility]: No      What [facility]: [phone]

## 2019-10-28 ENCOUNTER — TELEPHONE (OUTPATIENT)
Dept: OBSTETRICS AND GYNECOLOGY | Facility: CLINIC | Age: 64
End: 2019-10-28

## 2019-10-28 NOTE — TELEPHONE ENCOUNTER
----- Message from Graham Beasley sent at 10/28/2019 11:40 AM CDT -----  Contact: KERMIT LICONA [[id number]]  Name of Who is Calling: KERMIT LICONA [[id number]]      What is the request in detail: Pt is requesting a call back from clinical team in regard to Pt would like to cancel her Procedure. Please contact to further discuss and advise.          Can the clinic reply by [facility]: Yes      What Number to Call Back [facility] not [facility]: [phone]

## 2019-10-28 NOTE — TELEPHONE ENCOUNTER
Spoke with patient whom requested to have a hysterectomy, instead of a D&C. Dr. Azar will follow up about further recommendations.

## 2019-10-30 ENCOUNTER — PATIENT MESSAGE (OUTPATIENT)
Dept: OBSTETRICS AND GYNECOLOGY | Facility: CLINIC | Age: 64
End: 2019-10-30

## 2019-10-31 ENCOUNTER — PATIENT OUTREACH (OUTPATIENT)
Dept: ADMINISTRATIVE | Facility: OTHER | Age: 64
End: 2019-10-31

## 2019-10-31 ENCOUNTER — OFFICE VISIT (OUTPATIENT)
Dept: OBSTETRICS AND GYNECOLOGY | Facility: CLINIC | Age: 64
End: 2019-10-31
Attending: OBSTETRICS & GYNECOLOGY
Payer: COMMERCIAL

## 2019-10-31 VITALS
WEIGHT: 171.5 LBS | BODY MASS INDEX: 28.57 KG/M2 | HEIGHT: 65 IN | SYSTOLIC BLOOD PRESSURE: 138 MMHG | DIASTOLIC BLOOD PRESSURE: 86 MMHG

## 2019-10-31 DIAGNOSIS — N95.0 POST-MENOPAUSAL BLEEDING: Primary | ICD-10-CM

## 2019-10-31 PROCEDURE — 3008F PR BODY MASS INDEX (BMI) DOCUMENTED: ICD-10-PCS | Mod: CPTII,S$GLB,, | Performed by: OBSTETRICS & GYNECOLOGY

## 2019-10-31 PROCEDURE — 99214 PR OFFICE/OUTPT VISIT, EST, LEVL IV, 30-39 MIN: ICD-10-PCS | Mod: S$GLB,,, | Performed by: OBSTETRICS & GYNECOLOGY

## 2019-10-31 PROCEDURE — 3008F BODY MASS INDEX DOCD: CPT | Mod: CPTII,S$GLB,, | Performed by: OBSTETRICS & GYNECOLOGY

## 2019-10-31 PROCEDURE — 99214 OFFICE O/P EST MOD 30 MIN: CPT | Mod: S$GLB,,, | Performed by: OBSTETRICS & GYNECOLOGY

## 2019-11-01 ENCOUNTER — TELEPHONE (OUTPATIENT)
Dept: OBSTETRICS AND GYNECOLOGY | Facility: CLINIC | Age: 64
End: 2019-11-01

## 2019-11-01 NOTE — TELEPHONE ENCOUNTER
----- Message from Hany Davey, Patient Care Assistant sent at 11/1/2019  3:06 PM CDT -----  Contact: KERMIT LICONA [[id number]]  Name of Who is Calling: KERMIT LICONA [[id number]]    What is the request in detail:Requesting a call back in regards of surgery date. Please contact to further discuss and advise      Can the clinic reply by [facility]: No    What Number to Call Back [facility]: [phone]

## 2019-11-01 NOTE — TELEPHONE ENCOUNTER
Spoke with patient and informed her surgery will be scheduled tentatively 12/05/2019. Patient has no further questions or complaints.

## 2019-11-06 ENCOUNTER — TELEPHONE (OUTPATIENT)
Dept: OBSTETRICS AND GYNECOLOGY | Facility: CLINIC | Age: 64
End: 2019-11-06

## 2019-11-06 NOTE — TELEPHONE ENCOUNTER
----- Message from Katlin Barlow sent at 11/6/2019  2:50 PM CST -----  Contact: Self            Name of Who is Calling: KERMIT LICONA [[id number]]      What is the request in detail: Pt state she would like to know if her FMLA form is completed. Pt states she has already submitted the forms for her upcoming surgery. Please contact to further discuss and advise.        Can the clinic reply by [facility]: N      What Number to Call Back [facility] not [facility]: [phone]

## 2019-11-07 ENCOUNTER — TELEPHONE (OUTPATIENT)
Dept: OBSTETRICS AND GYNECOLOGY | Facility: CLINIC | Age: 64
End: 2019-11-07

## 2019-11-07 DIAGNOSIS — Z87.42 HISTORY OF POSTMENOPAUSAL BLEEDING: Primary | ICD-10-CM

## 2019-11-08 ENCOUNTER — TELEPHONE (OUTPATIENT)
Dept: OBSTETRICS AND GYNECOLOGY | Facility: CLINIC | Age: 64
End: 2019-11-08

## 2019-11-08 NOTE — TELEPHONE ENCOUNTER
Spoke with patient whom requested to  her disability forms from the disability department. Patient was given disabilities phone number to .  Patient has no further questions or complaints.

## 2019-11-08 NOTE — TELEPHONE ENCOUNTER
----- Message from Poly Rodríguez sent at 11/8/2019 11:43 AM CST -----  Contact: KERMIT LICONA [[id number]]  Name of Who is Calling: KERMIT LICONA [[id number]]    What is the request in detail: Would like to know if [facility] paperwork has been sent. Please contact to further discuss and advise      Can the clinic reply by [facility]: no    What Number to Call Back if not in [facility]: [phone]

## 2019-11-13 ENCOUNTER — TELEPHONE (OUTPATIENT)
Dept: INTERNAL MEDICINE | Facility: CLINIC | Age: 64
End: 2019-11-13

## 2019-11-13 ENCOUNTER — PATIENT MESSAGE (OUTPATIENT)
Dept: SURGERY | Facility: OTHER | Age: 64
End: 2019-11-13

## 2019-11-13 NOTE — TELEPHONE ENCOUNTER
----- Message from Geri Azar MD sent at 11/13/2019  4:47 PM CST -----  Hello All,  I was reviewing this pt chart for pre op hyst scheduled on 12/05/19 at [facility].  I noted a cardiology work up done in 2015 where she had a positive stress test at an outside facility.    Can you please look over her chart and give me a clearance for surgery.  You may have seen this before but I did not and just making sure we do not need to repeat some of these tests before general anesthesia.  Thanks so much  Dr Azar  GYN

## 2019-11-13 NOTE — H&P
64 y.o.  presents for pre-op H&P for TLH/BSO with cystoscope for post menopausal bleed with hyperplasia without atypia and cramping and tenderness. Her ultrasound shows 12 cm stripe with ?fibroid or polyp in the endometrium. She is sick of the bleeding and we have changed her HRT and she does not desire to stop as is miserable off of the HRT.. She has had appendectomy and BTL and abdominoplasty but no other abdominal surgery. She has HTN and had positive stress test in  and am waiting for pre op clearance. She was cleared for surgery. No LMP recorded. Patient is postmenopausal..    Past Medical History:   Diagnosis Date    Arthritis     GERD (gastroesophageal reflux disease)     Heart murmur      Past Surgical History:   Procedure Laterality Date    abdominal surgery      abdominoplasty    APPENDECTOMY          COLONOSCOPY      COLONOSCOPY N/A 2019    Procedure: COLONOSCOPY;  Surgeon: Rose Mary Ervin MD;  Location: [address]);  Service: Endoscopy;  Laterality: N/A;  no PM prep    JOINT REPLACEMENT      KNEE SURGERY Right 12-21-15    TKR    KNEE SURGERY Right 2015    TKR    TUBAL LIGATION      age 30's     Family History   Problem Relation Age of Onset    Diabetes Mother     Hypertension Mother     Hypothyroidism Mother     Asthma Father     Hypertension Sister     Cancer Sister         breast    Breast cancer Sister     Hypertension Brother     Hypertension Sister     Hypertension Sister     Hypertension Brother     Hypertension Son     Colon cancer Neg Hx     Ovarian cancer Neg Hx      Review of patient's allergies indicates:  No Known Allergies    Current Outpatient Medications:     cholecalciferol, vitamin D3, (VITAMIN D3) 5,000 unit Tab, Take 5,000 Units by mouth once daily., Disp: , Rfl:     fish oil-omega-3 fatty acids 300-1,000 mg capsule, Take 2 g by mouth once daily., Disp: , Rfl:     miSOPROStol (CYTOTEC) 200 MCG Tab, Take 1 tablet (200 mcg  total) by mouth every 6 (six) hours. for 1 day, Disp: 4 tablet, Rfl: 0    multivitamin capsule, Take 1 capsule by mouth once daily., Disp: , Rfl:     progesterone (PROMETRIUM) 100 MG capsule, Take 1 capsule (100 mg total) by mouth nightly., Disp: 90 capsule, Rfl: 3    Current Facility-Administered Medications:     testosterone cypionate injection 50 mg, 50 mg, Intramuscular, Q28 Days, Geri Azar MD, 50 mg at 10/15/19 1532  Social History     Socioeconomic History    Marital status:      Spouse name: Not on file    Number of children: 2    Years of education: Not on file    Highest education level: Not on file   Occupational History    Occupation: RN   Social Needs    Financial resource strain: Not on file    Food insecurity:     Worry: Not on file     Inability: Not on file    Transportation needs:     Medical: Not on file     Non-medical: Not on file   Tobacco Use    Smoking status: Never Smoker    Smokeless tobacco: Never Used    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at [employer]   Substance and Sexual Activity    Alcohol use: No    Drug use: No    Sexual activity: Yes     Partners: Male     Birth control/protection: None   Lifestyle    Physical activity:     Days per week: Not on file     Minutes per session: Not on file    Stress: Not on file   Relationships    Social connections:     Talks on phone: Not on file     Gets together: Not on file     Attends Taoism service: Not on file     Active member of club or organization: Not on file     Attends meetings of clubs or organizations: Not on file     Relationship status: Not on file   Other Topics Concern    Not on file   Social History Narrative    ** Merged History Encounter **              Last pap do not have one on record but no history of abnormal paps  Last pathology 02/26/19  EMBx simple hyperplasia without atypia  Last ultrasound     CLINICAL HISTORY:  Postmenopausal bleeding    TECHNIQUE:  Transabdominal sonography of the pelvis was  performed, followed by transvaginal sonography to better evaluate the uterus and ovaries.    COMPARISON:  None.    FINDINGS:  The uterus is mildly prominent in size and measures 9 x 5.5 x 6.8 cm.  The endometrial stripe is thickened and measures 12mm.  Within the endometrium there is a somewhat subtle hypoechoic region with some internal vascularity.  It measures 2 x 0.8 x 1.2 cm.  Additionally, there are multiple uterine fibroids.  Along the posterior body of the uterus is a 2 x 1.8 x 2 cm submucosal fibroid.  Additional intramural fibroids measure 2.2, 2.1 and 1.7 cm.    The ovaries are normal in size and appearance.    The right ovary measures 1 x 2.1 x 1 cm.   The left ovary measures 1.9 x 1.7 x 0.7cm.    There is appropriate flow in the ovaries.      Impression       The endometrial stripe is thickened in this postmenopausal woman with bleeding, measuring 12 mm.  There is a subtle more focal hypoechoic region within the endometrium containing internal flow and measuring up to 2 cm maximally.  This may represent a polyp or fibroid.  Further evaluation recommended.    Uterine fibroids.    This report was flagged in Epic as abnormal.         Vitals:    10/31/19 1300   BP: 138/86     General Appearance: Alert, appropriate appearance for age. No acute distress, Chest/Respiratory Exam: Normal.   Cardiovascular Exam: regular rate and rhythm, S1, S2 normal, no murmur, click, rub or gallop and regular rate and rhythm   Gastrointestinal Exam: soft, non-tender; bowel sounds normal; no masses,  no organomegaly  Pelvic Exam Female: Vulva and vagina appear normal. Bimanual exam reveals normal uterus and adnexa. she does have some tenderness over the uterus.  Psychiatric Exam: Alert and oriented, appropriate affect.    Assessment: post menopausal bleeding with cramping and hyperplasia without atypia    Plan: TLH/BSO cystoscope awaiting clearance  I have discussed the risks, benefits, indications, and alternatives of the  procedure in detail.  The patient verbalizes her understanding.  All questions answered.  Consents signed.  The patient agrees to proceed to proceed as planned.

## 2019-11-13 NOTE — TELEPHONE ENCOUNTER
I spoke to pt regarding appt needed for preop.  Pt states she had EKG and labs done. These test were ordered by Dr. Azar. She will notify Dr. Azar tomorrow.  I scheduled her a preop visit for Mon 11-8-19.

## 2019-11-14 ENCOUNTER — TELEPHONE (OUTPATIENT)
Dept: INTERNAL MEDICINE | Facility: CLINIC | Age: 64
End: 2019-11-14

## 2019-11-18 ENCOUNTER — PATIENT MESSAGE (OUTPATIENT)
Dept: INTERNAL MEDICINE | Facility: CLINIC | Age: 64
End: 2019-11-18

## 2019-11-22 ENCOUNTER — LAB VISIT (OUTPATIENT)
Dept: LAB | Facility: HOSPITAL | Age: 64
End: 2019-11-22
Attending: INTERNAL MEDICINE
Payer: COMMERCIAL

## 2019-11-22 ENCOUNTER — OFFICE VISIT (OUTPATIENT)
Dept: INTERNAL MEDICINE | Facility: CLINIC | Age: 64
End: 2019-11-22
Payer: COMMERCIAL

## 2019-11-22 VITALS
WEIGHT: 170 LBS | TEMPERATURE: 98 F | DIASTOLIC BLOOD PRESSURE: 84 MMHG | HEIGHT: 65 IN | HEART RATE: 75 BPM | RESPIRATION RATE: 18 BRPM | SYSTOLIC BLOOD PRESSURE: 130 MMHG | BODY MASS INDEX: 28.32 KG/M2

## 2019-11-22 DIAGNOSIS — Z01.818 PRE-OP EXAM: Primary | ICD-10-CM

## 2019-11-22 DIAGNOSIS — N95.0 POST-MENOPAUSAL BLEEDING: ICD-10-CM

## 2019-11-22 DIAGNOSIS — R94.31 ABNORMAL EKG: ICD-10-CM

## 2019-11-22 DIAGNOSIS — Z01.818 PRE-OP EXAM: ICD-10-CM

## 2019-11-22 LAB
ANION GAP SERPL CALC-SCNC: 7 MMOL/L (ref 8–16)
BASOPHILS # BLD AUTO: 0.03 K/UL (ref 0–0.2)
BASOPHILS NFR BLD: 0.6 % (ref 0–1.9)
BUN SERPL-MCNC: 15 MG/DL (ref 8–23)
CALCIUM SERPL-MCNC: 9.2 MG/DL (ref 8.7–10.5)
CHLORIDE SERPL-SCNC: 105 MMOL/L (ref 95–110)
CO2 SERPL-SCNC: 29 MMOL/L (ref 23–29)
CREAT SERPL-MCNC: 1 MG/DL (ref 0.5–1.4)
DIFFERENTIAL METHOD: ABNORMAL
EOSINOPHIL # BLD AUTO: 0 K/UL (ref 0–0.5)
EOSINOPHIL NFR BLD: 0.9 % (ref 0–8)
ERYTHROCYTE [DISTWIDTH] IN BLOOD BY AUTOMATED COUNT: 13.1 % (ref 11.5–14.5)
EST. GFR  (AFRICAN AMERICAN): >60 ML/MIN/1.73 M^2
EST. GFR  (NON AFRICAN AMERICAN): 59.7 ML/MIN/1.73 M^2
GLUCOSE SERPL-MCNC: 84 MG/DL (ref 70–110)
HCT VFR BLD AUTO: 39.5 % (ref 37–48.5)
HGB BLD-MCNC: 12.1 G/DL (ref 12–16)
IMM GRANULOCYTES # BLD AUTO: 0.02 K/UL (ref 0–0.04)
IMM GRANULOCYTES NFR BLD AUTO: 0.4 % (ref 0–0.5)
LYMPHOCYTES # BLD AUTO: 2.1 K/UL (ref 1–4.8)
LYMPHOCYTES NFR BLD: 44.9 % (ref 18–48)
MCH RBC QN AUTO: 31.3 PG (ref 27–31)
MCHC RBC AUTO-ENTMCNC: 30.6 G/DL (ref 32–36)
MCV RBC AUTO: 102 FL (ref 82–98)
MONOCYTES # BLD AUTO: 0.6 K/UL (ref 0.3–1)
MONOCYTES NFR BLD: 11.8 % (ref 4–15)
NEUTROPHILS # BLD AUTO: 1.9 K/UL (ref 1.8–7.7)
NEUTROPHILS NFR BLD: 41.4 % (ref 38–73)
NRBC BLD-RTO: 0 /100 WBC
PLATELET # BLD AUTO: 275 K/UL (ref 150–350)
PMV BLD AUTO: 11.9 FL (ref 9.2–12.9)
POTASSIUM SERPL-SCNC: 3.9 MMOL/L (ref 3.5–5.1)
RBC # BLD AUTO: 3.86 M/UL (ref 4–5.4)
SODIUM SERPL-SCNC: 141 MMOL/L (ref 136–145)
WBC # BLD AUTO: 4.65 K/UL (ref 3.9–12.7)

## 2019-11-22 PROCEDURE — 99214 OFFICE O/P EST MOD 30 MIN: CPT | Mod: S$GLB,,, | Performed by: INTERNAL MEDICINE

## 2019-11-22 PROCEDURE — 36415 COLL VENOUS BLD VENIPUNCTURE: CPT | Mod: PO

## 2019-11-22 PROCEDURE — 99999 PR PBB SHADOW E&M-EST. PATIENT-LVL III: CPT | Mod: PBBFAC,,, | Performed by: INTERNAL MEDICINE

## 2019-11-22 PROCEDURE — 3008F BODY MASS INDEX DOCD: CPT | Mod: CPTII,S$GLB,, | Performed by: INTERNAL MEDICINE

## 2019-11-22 PROCEDURE — 99999 PR PBB SHADOW E&M-EST. PATIENT-LVL III: ICD-10-PCS | Mod: PBBFAC,,, | Performed by: INTERNAL MEDICINE

## 2019-11-22 PROCEDURE — 99214 PR OFFICE/OUTPT VISIT, EST, LEVL IV, 30-39 MIN: ICD-10-PCS | Mod: S$GLB,,, | Performed by: INTERNAL MEDICINE

## 2019-11-22 PROCEDURE — 3008F PR BODY MASS INDEX (BMI) DOCUMENTED: ICD-10-PCS | Mod: CPTII,S$GLB,, | Performed by: INTERNAL MEDICINE

## 2019-11-22 PROCEDURE — 80048 BASIC METABOLIC PNL TOTAL CA: CPT

## 2019-11-22 PROCEDURE — 85025 COMPLETE CBC W/AUTO DIFF WBC: CPT

## 2019-11-22 NOTE — PROGRESS NOTES
Subjective:       Patient ID: Brittany Modi is a 64 y.o. female.    Chief Complaint: Pre-op Exam    HPI   Pt with abnormal EKG is here for pre-op evaluation for Laparoscopic hysterectomy scheduled for 12/5/19 by Dr. Azar 2/2 post menopausal bleeding. She denies any hx of reactions to general anesthesia, difficulty with intubation, Hx of CAD/MI/CVA or any acute cardiopulmonary complaints today.   Review of Systems   Constitutional: Negative for activity change, appetite change, chills, diaphoresis, fatigue, fever and unexpected weight change.   HENT: Negative for congestion, mouth sores, postnasal drip, rhinorrhea, sinus pressure, sneezing, sore throat, trouble swallowing and voice change.    Eyes: Negative for pain, discharge and visual disturbance.   Respiratory: Negative for cough, shortness of breath and wheezing.    Cardiovascular: Negative for chest pain, palpitations and leg swelling.   Gastrointestinal: Negative for abdominal pain, blood in stool, constipation, diarrhea, nausea and vomiting.   Endocrine: Negative for cold intolerance and heat intolerance.   Genitourinary: Positive for menstrual problem. Negative for difficulty urinating, dysuria, frequency, hematuria and urgency.   Musculoskeletal: Negative for arthralgias and myalgias.   Skin: Negative for rash and wound.   Allergic/Immunologic: Negative for environmental allergies and food allergies.   Neurological: Negative for dizziness, tremors, seizures, syncope, weakness, light-headedness and headaches.   Hematological: Negative for adenopathy. Does not bruise/bleed easily.   Psychiatric/Behavioral: Negative for confusion and sleep disturbance. The patient is not nervous/anxious.        Objective:      Physical Exam   Constitutional: She is oriented to person, place, and time. She appears well-developed and well-nourished. No distress.   HENT:   Head: Normocephalic and atraumatic.   Right Ear: External ear normal.   Left Ear: External ear normal.    Nose: Nose normal.   Mouth/Throat: Oropharynx is clear and moist. No oropharyngeal exudate.   Eyes: Pupils are equal, round, and reactive to light. Conjunctivae and EOM are normal. Right eye exhibits no discharge. Left eye exhibits no discharge. No scleral icterus.   Neck: Neck supple. No JVD present. No thyromegaly present.   Cardiovascular: Normal rate, regular rhythm, normal heart sounds and intact distal pulses.   No murmur heard.  Pulmonary/Chest: Effort normal and breath sounds normal. No respiratory distress. She has no wheezes. She has no rales. She exhibits no tenderness.   Abdominal: Soft. Bowel sounds are normal. She exhibits no distension. There is no tenderness. There is no guarding.   Musculoskeletal: She exhibits no edema.   Lymphadenopathy:     She has no cervical adenopathy.   Neurological: She is alert and oriented to person, place, and time. No cranial nerve deficit. Coordination normal.   Skin: Skin is warm and dry. No rash noted. She is not diaphoretic. No pallor.   Psychiatric: She has a normal mood and affect. Judgment normal.   Nursing note and vitals reviewed.      Assessment:       1. Pre-op exam    2. Post-menopausal bleeding    3. Abnormal EKG        Plan:    1. Check Labs/UA, recent EKG reviewed and is abnormal   2. Hysterectomy scheduled for 12/5/19 by Dr. Azar    3. Referral to cardiology for clearance      At this time there are no contraindications to surgery.

## 2019-11-25 ENCOUNTER — PATIENT OUTREACH (OUTPATIENT)
Dept: ADMINISTRATIVE | Facility: OTHER | Age: 64
End: 2019-11-25

## 2019-11-27 ENCOUNTER — PATIENT MESSAGE (OUTPATIENT)
Dept: SURGERY | Facility: OTHER | Age: 64
End: 2019-11-27

## 2019-11-27 ENCOUNTER — OFFICE VISIT (OUTPATIENT)
Dept: CARDIOLOGY | Facility: CLINIC | Age: 64
End: 2019-11-27
Payer: COMMERCIAL

## 2019-11-27 VITALS
WEIGHT: 173.75 LBS | HEART RATE: 75 BPM | BODY MASS INDEX: 27.92 KG/M2 | HEIGHT: 66 IN | DIASTOLIC BLOOD PRESSURE: 71 MMHG | SYSTOLIC BLOOD PRESSURE: 153 MMHG

## 2019-11-27 DIAGNOSIS — Z86.79 H/O ANGINA PECTORIS: ICD-10-CM

## 2019-11-27 DIAGNOSIS — Z01.818 PREOPERATIVE CLEARANCE: Primary | ICD-10-CM

## 2019-11-27 PROCEDURE — 3008F BODY MASS INDEX DOCD: CPT | Mod: CPTII,S$GLB,, | Performed by: INTERNAL MEDICINE

## 2019-11-27 PROCEDURE — 99213 PR OFFICE/OUTPT VISIT, EST, LEVL III, 20-29 MIN: ICD-10-PCS | Mod: S$GLB,,, | Performed by: INTERNAL MEDICINE

## 2019-11-27 PROCEDURE — 3008F PR BODY MASS INDEX (BMI) DOCUMENTED: ICD-10-PCS | Mod: CPTII,S$GLB,, | Performed by: INTERNAL MEDICINE

## 2019-11-27 PROCEDURE — 99213 OFFICE O/P EST LOW 20 MIN: CPT | Mod: S$GLB,,, | Performed by: INTERNAL MEDICINE

## 2019-11-27 PROCEDURE — 99999 PR PBB SHADOW E&M-EST. PATIENT-LVL III: CPT | Mod: PBBFAC,,, | Performed by: INTERNAL MEDICINE

## 2019-11-27 PROCEDURE — 99999 PR PBB SHADOW E&M-EST. PATIENT-LVL III: ICD-10-PCS | Mod: PBBFAC,,, | Performed by: INTERNAL MEDICINE

## 2019-11-27 NOTE — PROGRESS NOTES
Cardiology Clinic Note  Reason for Visit: Chest Pain; Abnormal ECG; and Pre-op Exam    HPI:   Ms. Brittany Modi is a 64 y.o. woman who presents for pre-operative evaluation prior to hysterectomy scheduled for 12/5/2019. She reports having exertional chest pain that has been present for last 6 years. She had an abnormal stress test in 2015 in [place](abnormal EKG with chest pain but no findings on imaging) for which she underwent coronary angiogram that showed normal coronary arteries. She was prescribed aspirin and metoprolol which is no longer taking(for microvascular angina) because metoprolol caused low BP. She denies any dizziness or syncope, Shortness of breath. She denies any history of CVA or CHF.    ROS:    Review of Systems   All other systems reviewed and are negative.    PMH:     Past Medical History:   Diagnosis Date    Arthritis     GERD (gastroesophageal reflux disease)     Heart murmur      Past Surgical History:   Procedure Laterality Date    abdominal surgery      abdominoplasty    APPENDECTOMY      2010    COLONOSCOPY      COLONOSCOPY N/A 8/22/2019    Procedure: COLONOSCOPY;  Surgeon: Rose Mary Ervin MD;  Location: [address];  Service: Endoscopy;  Laterality: N/A;  no PM prep    JOINT REPLACEMENT      KNEE SURGERY Right 12-21-15    TKR    KNEE SURGERY Right 12/2015    TKR    TUBAL LIGATION      age 30's     Allergies:   Review of patient's allergies indicates:  No Known Allergies  Medications:     Current Outpatient Medications on File Prior to Visit   Medication Sig Dispense Refill    cholecalciferol, vitamin D3, (VITAMIN D3) 5,000 unit Tab Take 5,000 Units by mouth once daily.      fish oil-omega-3 fatty acids 300-1,000 mg capsule Take 2 g by mouth once daily.      multivitamin capsule Take 1 capsule by mouth once daily.      miSOPROStol (CYTOTEC) 200 MCG Tab Take 1 tablet (200 mcg total) by mouth every 6 (six) hours. for 1 day (Patient not taking: Reported on  "11/27/2019) 4 tablet 0    progesterone (PROMETRIUM) 100 MG capsule Take 1 capsule (100 mg total) by mouth nightly. (Patient not taking: Reported on 11/27/2019) 90 capsule 3     Current Facility-Administered Medications on File Prior to Visit   Medication Dose Route Frequency Provider Last Rate Last Dose    testosterone cypionate injection 50 mg  50 mg Intramuscular Q28 Days Geri Azar MD   50 mg at 10/15/19 1532     Social History:     Social History     Tobacco Use    Smoking status: Never Smoker    Smokeless tobacco: Never Used    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at [employer]   Substance Use Topics    Alcohol use: No     Family History:     Family History   Problem Relation Age of Onset    Diabetes Mother     Hypertension Mother     Hypothyroidism Mother     Asthma Father     Hypertension Sister     Cancer Sister         breast    Breast cancer Sister     Hypertension Brother     Hypertension Sister     Hypertension Sister     Hypertension Brother     Hypertension Son     Colon cancer Neg Hx     Ovarian cancer Neg Hx      Physical Exam:   BP (!) 153/71 (BP Location: Left arm, Patient Position: Sitting, BP Method: Medium (Automatic))   Pulse 75   Ht 5' 5.5" (1.664 m)   Wt 78.8 kg (173 lb 11.6 oz)   BMI 28.47 kg/m²      Physical Exam  General: alert, awake and oriented x 3  Eyes:PERRL.   Neck:no JVD   Lungs:  clear to auscultation bilaterally   Cardiovascular: Heart: regular rate and rhythm, S1, S2 normal, no murmur, click, rub or gallop.   Chest Wall: no tenderness.   Pulses-2+ radial, femoral, DP, PT b/l  Extremities: no cyanosis or edema.   Abdomen/Rectal: Abdomen: soft, non-tender non-distented; bowel sounds normal  Neurologic: Normal strength and tone. No focal numbness or weakness  Labs:     Lab Results   Component Value Date     11/22/2019    K 3.9 11/22/2019     11/22/2019    CO2 29 11/22/2019    BUN 15 11/22/2019    CREATININE 1.0 11/22/2019    ANIONGAP 7 (L) 11/22/2019     Lab " Results   Component Value Date    HGBA1C 4.9 07/19/2019     No results found for: BNP, BNPTRIAGEBLO Lab Results   Component Value Date    WBC 4.65 11/22/2019    HGB 12.1 11/22/2019    HCT 39.5 11/22/2019     11/22/2019    GRAN 1.9 11/22/2019    GRAN 41.4 11/22/2019     Lab Results   Component Value Date    CHOL 165 07/19/2019    HDL 44 07/19/2019    LDLCALC 107.0 07/19/2019    TRIG 70 07/19/2019        EKG with non specific TWI inferior leads which are unchanged from 2015    Assessment/plan:      Preoperative clearance for laparoscopic hysterectomy  Abnormal EKG  Chest pain     Revised cardiac risk index 1 based on intra peritoneal surgery but with no other risk factors-Class II risk with risk of 30 day MACE at 6 %-No further workup is needed from cardiac standpoint. Proceed with surgery with above risk. She had normal coronaries in 2015 and her symptoms might be due to microvascular angina. Patient will follow up with cardiology after her surgery for possible Cardiac PET.        Discussed with Dr. Pack. RTC in 1 month.         TINO BENZ  CARDIOLOGY FELLOW, PGY 6  [phone]

## 2019-12-03 ENCOUNTER — ANESTHESIA EVENT (OUTPATIENT)
Dept: SURGERY | Facility: OTHER | Age: 64
End: 2019-12-03
Payer: COMMERCIAL

## 2019-12-03 ENCOUNTER — HOSPITAL ENCOUNTER (OUTPATIENT)
Dept: PREADMISSION TESTING | Facility: OTHER | Age: 64
Discharge: HOME | End: 2019-12-03
Attending: OBSTETRICS & GYNECOLOGY
Payer: COMMERCIAL

## 2019-12-03 VITALS
WEIGHT: 170 LBS | OXYGEN SATURATION: 97 % | SYSTOLIC BLOOD PRESSURE: 140 MMHG | TEMPERATURE: 98 F | BODY MASS INDEX: 28.32 KG/M2 | RESPIRATION RATE: 16 BRPM | HEIGHT: 65 IN | DIASTOLIC BLOOD PRESSURE: 70 MMHG | HEART RATE: 72 BPM

## 2019-12-03 DIAGNOSIS — Z01.818 PREOP TESTING: Primary | ICD-10-CM

## 2019-12-03 LAB
ABO + RH BLD: NORMAL
BLD GP AB SCN CELLS X3 SERPL QL: NORMAL

## 2019-12-03 PROCEDURE — 36415 COLL VENOUS BLD VENIPUNCTURE: CPT

## 2019-12-03 PROCEDURE — 86850 RBC ANTIBODY SCREEN: CPT

## 2019-12-03 RX ORDER — MIDAZOLAM HYDROCHLORIDE 1 MG/ML
2 INJECTION INTRAMUSCULAR; INTRAVENOUS
Status: CANCELLED | OUTPATIENT
Start: 2019-12-03 | End: 2019-12-03

## 2019-12-03 RX ORDER — ACETAMINOPHEN 500 MG
1000 TABLET ORAL
Status: CANCELLED | OUTPATIENT
Start: 2019-12-03 | End: 2019-12-03

## 2019-12-03 RX ORDER — CELECOXIB 200 MG/1
400 CAPSULE ORAL
Status: CANCELLED | OUTPATIENT
Start: 2019-12-03 | End: 2019-12-03

## 2019-12-03 RX ORDER — LIDOCAINE HYDROCHLORIDE 10 MG/ML
0.5 INJECTION, SOLUTION EPIDURAL; INFILTRATION; INTRACAUDAL; PERINEURAL ONCE
Status: CANCELLED | OUTPATIENT
Start: 2019-12-03 | End: 2019-12-03

## 2019-12-03 RX ORDER — PREGABALIN 75 MG/1
75 CAPSULE ORAL
Status: CANCELLED | OUTPATIENT
Start: 2019-12-03 | End: 2019-12-03

## 2019-12-03 RX ORDER — SODIUM CHLORIDE, SODIUM LACTATE, POTASSIUM CHLORIDE, CALCIUM CHLORIDE 600; 310; 30; 20 MG/100ML; MG/100ML; MG/100ML; MG/100ML
INJECTION, SOLUTION INTRAVENOUS CONTINUOUS
Status: CANCELLED | OUTPATIENT
Start: 2019-12-03

## 2019-12-03 NOTE — ANESTHESIA PREPROCEDURE EVALUATION
12/03/2019  Brittany Modi is a 64 y.o., female.    Anesthesia Evaluation    I have reviewed the Patient Summary Reports.    I have reviewed the Nursing Notes.   I have reviewed the Medications.     Review of Systems  Anesthesia Hx:  No problems with previous Anesthesia  History of prior surgery of interest to airway management or planning: Previous anesthesia: Spinal  2015 TKA with spinal.  Denies Family Hx of Anesthesia complications.   Denies Personal Hx of Anesthesia complications.   Social:  Non-Smoker    Hematology/Oncology:  Hematology Normal   Oncology Normal     EENT/Dental:EENT/Dental Normal   Cardiovascular:   Denies CAD.   Dysrhythmias atrial fibrillation ECG has been reviewed. Pt has a history of atypical chest pain, prior w/u's (extensive) all negative. Various med tx failed.  Still c/o 2-3x chest pain each month, not related to exertion.    NOTE: pt told she was in A fib during TKA under spinal at [facility], but has never been in it to her knowledge before or since. Never placed on anticoagulants   Pulmonary:  Pulmonary Normal    Renal/:  Renal/ Normal     Hepatic/GI:   GERD    Musculoskeletal:   Arthritis     Neurological:  Neurology Normal    Endocrine:  Endocrine Normal    Dermatological:  Skin Normal    Psych:  Psychiatric Normal           Physical Exam  General:  Well nourished    Airway/Jaw/Neck:  Airway Findings: Mouth Opening: Normal Tongue: Normal  General Airway Assessment: Adult  Mallampati: II      Dental:  Dental Findings: Upper partial dentures        Mental Status:  Mental Status Findings:  Cooperative, Alert and Oriented         Anesthesia Plan  Type of Anesthesia, risks & benefits discussed:  Anesthesia Type:  general  Patient's Preference:   Intra-op Monitoring Plan:   Intra-op Monitoring Plan Comments:   Post Op Pain Control Plan: multimodal analgesia and per primary  service following discharge from PACU  Post Op Pain Control Plan Comments:   Induction:   IV  Beta Blocker:         Informed Consent: Patient understands risks and agrees with Anesthesia plan.  Questions answered. Anesthesia consent signed with patient.  ASA Score: 2     Day of Surgery Review of History & Physical:    H&P update referred to the surgeon.     Anesthesia Plan Notes: Cardiac clearance in epic,labs ok in epic,T and S today        Ready For Surgery From Anesthesia Perspective.

## 2019-12-03 NOTE — DISCHARGE INSTRUCTIONS
PRE-ADMIT TESTING -  [phone]    [address]  [place] [facility]          Your surgery has been scheduled at [facility]. We are pleased to have the opportunity to serve you. For Further Information please call [phone].    On the day of surgery please report to the Information Desk on the 1st floor.    · CONTACT YOUR PHYSICIAN'S OFFICE THE DAY PRIOR TO YOUR SURGERY TO OBTAIN YOUR ARRIVAL TIME.     · The evening before surgery do not eat anything after 9 p.m. ( this includes hard candy, chewing gum and mints).  You may only have GATORADE, POWERADE AND WATER  from 9 p.m. until you leave your home.   DO NOT DRINK ANY LIQUIDS ON THE WAY TO THE HOSPITAL.      SPECIAL MEDICATION INSTRUCTIONS: TAKE medications checked off by the Anesthesiologist on your Medication List.    Angiogram Patients: Take medications as instructed by your physician, including aspirin.     Surgery Patients:    If you take ASPIRIN - Your PHYSICIAN/SURGEON will need to inform you IF/OR when you need to stop taking aspirin prior to your surgery.     Do Not take any medications containing IBUPROFEN.  Do Not Wear any make-up or dark nail polish   (especially eye make-up) to surgery. If you come to surgery with makeup on you will be required to remove the makeup or nail polish.    Do not shave your surgical area at least 5 days prior to your surgery. The surgical prep will be performed at the hospital according to Infection Control regulations.    Leave all valuables at home.   Do Not wear any jewelry or watches, including any metal in body piercings. Jewelry must be removed prior to coming to the hospital.  There is a possibility that rings that are unable to be removed may be cut off if they are on the surgical extremity.    Contact Lens must be removed before surgery. Either do not wear the contact lens or bring a case and solution for storage.  Please bring a container for eyeglasses or dentures as required.  Bring  any paperwork your physician has provided, such as consent forms,  history and physicals, doctor's orders, etc.   Bring comfortable clothes that are loose fitting to wear upon discharge. Take into consideration the type of surgery being performed.  Maintain your diet as advised per your physician the day prior to surgery.      Adequate rest the night before surgery is advised.   Park in the Parking lot behind the hospital or in the [facility] across the street from the parking lot. Parking is complimentary.  If you will be discharged the same day as your procedure, please arrange for a responsible adult to drive you home or to accompany you if traveling by taxi.   YOU WILL NOT BE PERMITTED TO DRIVE OR TO LEAVE THE HOSPITAL ALONE AFTER SURGERY.   It is strongly recommended that you arrange for someone to remain with you for the first 24 hrs following your surgery.    The Surgeon will speak to your family/visitor after your surgery regarding the outcome of your surgery and post op care.  The Surgeon may speak to you after your surgery, but there is a possibility you may not remember the details.  Please check with your family members regarding the conversation with the Surgeon.    We strongly recommend whoever is bringing you home be present for discharge instructions.  This will ensure a thorough understanding for your post op home care.    EACH PATIENT IS ALLOWED TWO FAMILY MEMBERS OR VISITORS IN THE ROOM AND IN THE WAITING ROOMS WHILE YOU ARE IN SURGERY. ALL CHILDREN MUST ALWAYS BE ACCOMPANIED BY AN ADULT.    Thank you for your cooperation.  The Staff of [facility].                Bathing Instructions with Hibiclens     Shower the evening before and morning of your procedure with Hibiclens:   Wash your face with water and your regular face wash/soap   Apply Hibiclens directly on your skin or on a wet washcloth and wash gently. When showering: Move away from the shower stream when  applying Hibiclens to avoid rinsing off too soon.   Rinse thoroughly with warm water   Do not dilute Hibiclens         Dry off as usual, do not use any deodorant, powder, body lotions, perfume, after shave or cologne.

## 2019-12-04 RX ORDER — MUPIROCIN 20 MG/G
OINTMENT TOPICAL
Status: CANCELLED | OUTPATIENT
Start: 2019-12-04

## 2019-12-04 RX ORDER — SODIUM CHLORIDE 9 MG/ML
INJECTION, SOLUTION INTRAVENOUS CONTINUOUS
Status: CANCELLED | OUTPATIENT
Start: 2019-12-04

## 2019-12-05 ENCOUNTER — HOSPITAL ENCOUNTER (OUTPATIENT)
Facility: OTHER | Age: 64
Discharge: HOME | End: 2019-12-06
Attending: OBSTETRICS & GYNECOLOGY | Admitting: OBSTETRICS & GYNECOLOGY
Payer: COMMERCIAL

## 2019-12-05 ENCOUNTER — ANESTHESIA (OUTPATIENT)
Dept: SURGERY | Facility: OTHER | Age: 64
End: 2019-12-05
Payer: COMMERCIAL

## 2019-12-05 DIAGNOSIS — Z90.710 S/P LAPAROSCOPIC HYSTERECTOMY: Primary | ICD-10-CM

## 2019-12-05 DIAGNOSIS — N95.0 POST-MENOPAUSAL BLEEDING: ICD-10-CM

## 2019-12-05 LAB — POCT GLUCOSE: 86 MG/DL (ref 70–110)

## 2019-12-05 PROCEDURE — 63600175 PHARM REV CODE 636 W HCPCS: Performed by: NURSE ANESTHETIST, CERTIFIED REGISTERED

## 2019-12-05 PROCEDURE — 88307 PR  SURG PATH,LEVEL V: ICD-10-PCS | Mod: 26,,, | Performed by: PATHOLOGY

## 2019-12-05 PROCEDURE — 25000003 PHARM REV CODE 250: Performed by: OBSTETRICS & GYNECOLOGY

## 2019-12-05 PROCEDURE — 25000003 PHARM REV CODE 250: Performed by: NURSE ANESTHETIST, CERTIFIED REGISTERED

## 2019-12-05 PROCEDURE — 63600175 PHARM REV CODE 636 W HCPCS: Performed by: ANESTHESIOLOGY

## 2019-12-05 PROCEDURE — 88307 TISSUE EXAM BY PATHOLOGIST: CPT | Mod: 26,,, | Performed by: PATHOLOGY

## 2019-12-05 PROCEDURE — 25000003 PHARM REV CODE 250: Performed by: STUDENT IN AN ORGANIZED HEALTH CARE EDUCATION/TRAINING PROGRAM

## 2019-12-05 PROCEDURE — 36000711: Performed by: OBSTETRICS & GYNECOLOGY

## 2019-12-05 PROCEDURE — 25000003 PHARM REV CODE 250: Performed by: ANESTHESIOLOGY

## 2019-12-05 PROCEDURE — 37000008 HC ANESTHESIA 1ST 15 MINUTES: Performed by: OBSTETRICS & GYNECOLOGY

## 2019-12-05 PROCEDURE — 63600175 PHARM REV CODE 636 W HCPCS: Performed by: OBSTETRICS & GYNECOLOGY

## 2019-12-05 PROCEDURE — 71000033 HC RECOVERY, INTIAL HOUR: Performed by: OBSTETRICS & GYNECOLOGY

## 2019-12-05 PROCEDURE — 82962 GLUCOSE BLOOD TEST: CPT | Performed by: OBSTETRICS & GYNECOLOGY

## 2019-12-05 PROCEDURE — 71000039 HC RECOVERY, EACH ADD'L HOUR: Performed by: OBSTETRICS & GYNECOLOGY

## 2019-12-05 PROCEDURE — 37000009 HC ANESTHESIA EA ADD 15 MINS: Performed by: OBSTETRICS & GYNECOLOGY

## 2019-12-05 PROCEDURE — 58571 TLH W/T/O 250 G OR LESS: CPT | Mod: ,,, | Performed by: OBSTETRICS & GYNECOLOGY

## 2019-12-05 PROCEDURE — 36000710: Performed by: OBSTETRICS & GYNECOLOGY

## 2019-12-05 PROCEDURE — 58571 PR LAPAROSCOPY W TOT HYSTERECTUTERUS <=250 GRAM  W TUBE/OVARY: ICD-10-PCS | Mod: ,,, | Performed by: OBSTETRICS & GYNECOLOGY

## 2019-12-05 PROCEDURE — 27201423 OPTIME MED/SURG SUP & DEVICES STERILE SUPPLY: Performed by: OBSTETRICS & GYNECOLOGY

## 2019-12-05 PROCEDURE — 63600175 PHARM REV CODE 636 W HCPCS: Performed by: STUDENT IN AN ORGANIZED HEALTH CARE EDUCATION/TRAINING PROGRAM

## 2019-12-05 PROCEDURE — 88307 TISSUE EXAM BY PATHOLOGIST: CPT | Performed by: PATHOLOGY

## 2019-12-05 RX ORDER — ONDANSETRON 8 MG/1
8 TABLET, ORALLY DISINTEGRATING ORAL EVERY 8 HOURS PRN
Status: DISCONTINUED | OUTPATIENT
Start: 2019-12-05 | End: 2019-12-06 | Stop reason: HOSPADM

## 2019-12-05 RX ORDER — SODIUM CHLORIDE, SODIUM LACTATE, POTASSIUM CHLORIDE, CALCIUM CHLORIDE 600; 310; 30; 20 MG/100ML; MG/100ML; MG/100ML; MG/100ML
INJECTION, SOLUTION INTRAVENOUS CONTINUOUS
Status: DISCONTINUED | OUTPATIENT
Start: 2019-12-05 | End: 2019-12-05

## 2019-12-05 RX ORDER — ONDANSETRON 2 MG/ML
INJECTION INTRAMUSCULAR; INTRAVENOUS
Status: DISCONTINUED | OUTPATIENT
Start: 2019-12-05 | End: 2019-12-05

## 2019-12-05 RX ORDER — SODIUM CHLORIDE 0.9 % (FLUSH) 0.9 %
3 SYRINGE (ML) INJECTION
Status: DISCONTINUED | OUTPATIENT
Start: 2019-12-05 | End: 2019-12-05

## 2019-12-05 RX ORDER — DOCUSATE SODIUM 100 MG/1
100 CAPSULE, LIQUID FILLED ORAL 2 TIMES DAILY
Status: DISCONTINUED | OUTPATIENT
Start: 2019-12-05 | End: 2019-12-06 | Stop reason: HOSPADM

## 2019-12-05 RX ORDER — FENTANYL CITRATE 50 UG/ML
INJECTION, SOLUTION INTRAMUSCULAR; INTRAVENOUS
Status: DISCONTINUED | OUTPATIENT
Start: 2019-12-05 | End: 2019-12-05

## 2019-12-05 RX ORDER — HYDROMORPHONE HYDROCHLORIDE 2 MG/ML
0.4 INJECTION, SOLUTION INTRAMUSCULAR; INTRAVENOUS; SUBCUTANEOUS EVERY 5 MIN PRN
Status: DISCONTINUED | OUTPATIENT
Start: 2019-12-05 | End: 2019-12-05 | Stop reason: HOSPADM

## 2019-12-05 RX ORDER — KETOROLAC TROMETHAMINE 30 MG/ML
30 INJECTION, SOLUTION INTRAMUSCULAR; INTRAVENOUS EVERY 6 HOURS
Status: COMPLETED | OUTPATIENT
Start: 2019-12-05 | End: 2019-12-06

## 2019-12-05 RX ORDER — LIDOCAINE HYDROCHLORIDE 10 MG/ML
0.5 INJECTION, SOLUTION EPIDURAL; INFILTRATION; INTRACAUDAL; PERINEURAL ONCE
Status: DISCONTINUED | OUTPATIENT
Start: 2019-12-05 | End: 2019-12-05 | Stop reason: HOSPADM

## 2019-12-05 RX ORDER — SIMETHICONE 80 MG
1 TABLET,CHEWABLE ORAL 3 TIMES DAILY PRN
Status: DISCONTINUED | OUTPATIENT
Start: 2019-12-05 | End: 2019-12-06 | Stop reason: HOSPADM

## 2019-12-05 RX ORDER — SODIUM CHLORIDE 9 MG/ML
INJECTION, SOLUTION INTRAVENOUS CONTINUOUS
Status: DISCONTINUED | OUTPATIENT
Start: 2019-12-05 | End: 2019-12-06 | Stop reason: HOSPADM

## 2019-12-05 RX ORDER — OXYCODONE AND ACETAMINOPHEN 10; 325 MG/1; MG/1
1 TABLET ORAL EVERY 4 HOURS PRN
Status: DISCONTINUED | OUTPATIENT
Start: 2019-12-05 | End: 2019-12-05

## 2019-12-05 RX ORDER — MIDAZOLAM HYDROCHLORIDE 1 MG/ML
2 INJECTION INTRAMUSCULAR; INTRAVENOUS
Status: COMPLETED | OUTPATIENT
Start: 2019-12-05 | End: 2019-12-05

## 2019-12-05 RX ORDER — DIPHENHYDRAMINE HCL 25 MG
25 CAPSULE ORAL EVERY 4 HOURS PRN
Status: DISCONTINUED | OUTPATIENT
Start: 2019-12-05 | End: 2019-12-06 | Stop reason: HOSPADM

## 2019-12-05 RX ORDER — PREGABALIN 75 MG/1
75 CAPSULE ORAL
Status: COMPLETED | OUTPATIENT
Start: 2019-12-05 | End: 2019-12-05

## 2019-12-05 RX ORDER — MUPIROCIN 20 MG/G
OINTMENT TOPICAL
Status: DISCONTINUED | OUTPATIENT
Start: 2019-12-05 | End: 2019-12-05 | Stop reason: HOSPADM

## 2019-12-05 RX ORDER — OXYCODONE AND ACETAMINOPHEN 5; 325 MG/1; MG/1
1 TABLET ORAL EVERY 4 HOURS PRN
Status: DISCONTINUED | OUTPATIENT
Start: 2019-12-05 | End: 2019-12-05

## 2019-12-05 RX ORDER — MEPERIDINE HYDROCHLORIDE 25 MG/ML
12.5 INJECTION INTRAMUSCULAR; INTRAVENOUS; SUBCUTANEOUS ONCE AS NEEDED
Status: DISCONTINUED | OUTPATIENT
Start: 2019-12-05 | End: 2019-12-05 | Stop reason: HOSPADM

## 2019-12-05 RX ORDER — OXYCODONE HYDROCHLORIDE 5 MG/1
5 TABLET ORAL
Status: DISCONTINUED | OUTPATIENT
Start: 2019-12-05 | End: 2019-12-05 | Stop reason: HOSPADM

## 2019-12-05 RX ORDER — DEXAMETHASONE SODIUM PHOSPHATE 4 MG/ML
INJECTION, SOLUTION INTRA-ARTICULAR; INTRALESIONAL; INTRAMUSCULAR; INTRAVENOUS; SOFT TISSUE
Status: DISCONTINUED | OUTPATIENT
Start: 2019-12-05 | End: 2019-12-05

## 2019-12-05 RX ORDER — ROCURONIUM BROMIDE 10 MG/ML
INJECTION, SOLUTION INTRAVENOUS
Status: DISCONTINUED | OUTPATIENT
Start: 2019-12-05 | End: 2019-12-05

## 2019-12-05 RX ORDER — PROPOFOL 10 MG/ML
VIAL (ML) INTRAVENOUS
Status: DISCONTINUED | OUTPATIENT
Start: 2019-12-05 | End: 2019-12-05

## 2019-12-05 RX ORDER — CELECOXIB 200 MG/1
400 CAPSULE ORAL
Status: COMPLETED | OUTPATIENT
Start: 2019-12-05 | End: 2019-12-05

## 2019-12-05 RX ORDER — BISACODYL 10 MG
10 SUPPOSITORY, RECTAL RECTAL DAILY PRN
Status: DISCONTINUED | OUTPATIENT
Start: 2019-12-05 | End: 2019-12-06 | Stop reason: HOSPADM

## 2019-12-05 RX ORDER — KETOROLAC TROMETHAMINE 30 MG/ML
INJECTION, SOLUTION INTRAMUSCULAR; INTRAVENOUS
Status: DISCONTINUED | OUTPATIENT
Start: 2019-12-05 | End: 2019-12-05

## 2019-12-05 RX ORDER — GLYCOPYRROLATE 0.2 MG/ML
INJECTION INTRAMUSCULAR; INTRAVENOUS
Status: DISCONTINUED | OUTPATIENT
Start: 2019-12-05 | End: 2019-12-05

## 2019-12-05 RX ORDER — SODIUM CHLORIDE 9 MG/ML
INJECTION, SOLUTION INTRAVENOUS CONTINUOUS
Status: DISCONTINUED | OUTPATIENT
Start: 2019-12-05 | End: 2019-12-05

## 2019-12-05 RX ORDER — MUPIROCIN 20 MG/G
1 OINTMENT TOPICAL 2 TIMES DAILY
Status: DISCONTINUED | OUTPATIENT
Start: 2019-12-05 | End: 2019-12-06 | Stop reason: HOSPADM

## 2019-12-05 RX ORDER — TRAMADOL HYDROCHLORIDE 50 MG/1
50 TABLET ORAL EVERY 6 HOURS PRN
Status: DISCONTINUED | OUTPATIENT
Start: 2019-12-05 | End: 2019-12-06 | Stop reason: HOSPADM

## 2019-12-05 RX ORDER — ONDANSETRON 2 MG/ML
4 INJECTION INTRAMUSCULAR; INTRAVENOUS DAILY PRN
Status: DISCONTINUED | OUTPATIENT
Start: 2019-12-05 | End: 2019-12-05 | Stop reason: HOSPADM

## 2019-12-05 RX ORDER — ACETAMINOPHEN 500 MG
1000 TABLET ORAL
Status: COMPLETED | OUTPATIENT
Start: 2019-12-05 | End: 2019-12-05

## 2019-12-05 RX ORDER — MIDAZOLAM HYDROCHLORIDE 1 MG/ML
INJECTION INTRAMUSCULAR; INTRAVENOUS
Status: DISCONTINUED | OUTPATIENT
Start: 2019-12-05 | End: 2019-12-05

## 2019-12-05 RX ORDER — NEOSTIGMINE METHYLSULFATE 1 MG/ML
INJECTION, SOLUTION INTRAVENOUS
Status: DISCONTINUED | OUTPATIENT
Start: 2019-12-05 | End: 2019-12-05

## 2019-12-05 RX ORDER — LIDOCAINE HCL/PF 100 MG/5ML
SYRINGE (ML) INTRAVENOUS
Status: DISCONTINUED | OUTPATIENT
Start: 2019-12-05 | End: 2019-12-05

## 2019-12-05 RX ORDER — DIPHENHYDRAMINE HYDROCHLORIDE 50 MG/ML
12.5 INJECTION INTRAMUSCULAR; INTRAVENOUS EVERY 30 MIN PRN
Status: DISCONTINUED | OUTPATIENT
Start: 2019-12-05 | End: 2019-12-05 | Stop reason: HOSPADM

## 2019-12-05 RX ORDER — IBUPROFEN 600 MG/1
600 TABLET ORAL EVERY 6 HOURS
Status: DISCONTINUED | OUTPATIENT
Start: 2019-12-06 | End: 2019-12-06 | Stop reason: HOSPADM

## 2019-12-05 RX ORDER — CEFAZOLIN SODIUM 1 G/3ML
2 INJECTION, POWDER, FOR SOLUTION INTRAMUSCULAR; INTRAVENOUS
Status: COMPLETED | OUTPATIENT
Start: 2019-12-05 | End: 2019-12-05

## 2019-12-05 RX ORDER — HYDROMORPHONE HYDROCHLORIDE 1 MG/ML
1 INJECTION, SOLUTION INTRAMUSCULAR; INTRAVENOUS; SUBCUTANEOUS EVERY 4 HOURS PRN
Status: DISCONTINUED | OUTPATIENT
Start: 2019-12-05 | End: 2019-12-06 | Stop reason: HOSPADM

## 2019-12-05 RX ORDER — DIPHENHYDRAMINE HYDROCHLORIDE 50 MG/ML
25 INJECTION INTRAMUSCULAR; INTRAVENOUS EVERY 4 HOURS PRN
Status: DISCONTINUED | OUTPATIENT
Start: 2019-12-05 | End: 2019-12-06 | Stop reason: HOSPADM

## 2019-12-05 RX ADMIN — MUPIROCIN 1 G: 20 OINTMENT TOPICAL at 12:12

## 2019-12-05 RX ADMIN — SIMETHICONE CHEW TAB 80 MG 80 MG: 80 TABLET ORAL at 05:12

## 2019-12-05 RX ADMIN — CELECOXIB 400 MG: 200 CAPSULE ORAL at 07:12

## 2019-12-05 RX ADMIN — SODIUM CHLORIDE: 0.9 INJECTION, SOLUTION INTRAVENOUS at 11:12

## 2019-12-05 RX ADMIN — KETOROLAC TROMETHAMINE 30 MG: 30 INJECTION, SOLUTION INTRAMUSCULAR; INTRAVENOUS at 05:12

## 2019-12-05 RX ADMIN — PREGABALIN 75 MG: 75 CAPSULE ORAL at 07:12

## 2019-12-05 RX ADMIN — KETOROLAC TROMETHAMINE 30 MG: 30 INJECTION, SOLUTION INTRAMUSCULAR; INTRAVENOUS at 10:12

## 2019-12-05 RX ADMIN — FENTANYL CITRATE 100 MCG: 50 INJECTION, SOLUTION INTRAMUSCULAR; INTRAVENOUS at 08:12

## 2019-12-05 RX ADMIN — SODIUM CHLORIDE: 0.9 INJECTION, SOLUTION INTRAVENOUS at 09:12

## 2019-12-05 RX ADMIN — FENTANYL CITRATE 50 MCG: 50 INJECTION, SOLUTION INTRAMUSCULAR; INTRAVENOUS at 09:12

## 2019-12-05 RX ADMIN — KETOROLAC TROMETHAMINE 30 MG: 30 INJECTION, SOLUTION INTRAMUSCULAR; INTRAVENOUS at 12:12

## 2019-12-05 RX ADMIN — ROCURONIUM BROMIDE 50 MG: 10 INJECTION, SOLUTION INTRAVENOUS at 08:12

## 2019-12-05 RX ADMIN — MUPIROCIN: 20 OINTMENT TOPICAL at 07:12

## 2019-12-05 RX ADMIN — CARBOXYMETHYLCELLULOSE SODIUM 2 DROP: 2.5 SOLUTION/ DROPS OPHTHALMIC at 08:12

## 2019-12-05 RX ADMIN — TRAMADOL HYDROCHLORIDE 50 MG: 50 TABLET, FILM COATED ORAL at 06:12

## 2019-12-05 RX ADMIN — KETOROLAC TROMETHAMINE 30 MG: 30 INJECTION, SOLUTION INTRAMUSCULAR; INTRAVENOUS at 11:12

## 2019-12-05 RX ADMIN — MIDAZOLAM HYDROCHLORIDE 2 MG: 1 INJECTION, SOLUTION INTRAMUSCULAR; INTRAVENOUS at 07:12

## 2019-12-05 RX ADMIN — NEOSTIGMINE METHYLSULFATE 5 MG: 1 INJECTION INTRAVENOUS at 10:12

## 2019-12-05 RX ADMIN — SIMETHICONE CHEW TAB 80 MG 80 MG: 80 TABLET ORAL at 11:12

## 2019-12-05 RX ADMIN — SODIUM CHLORIDE, SODIUM LACTATE, POTASSIUM CHLORIDE, AND CALCIUM CHLORIDE: 600; 310; 30; 20 INJECTION, SOLUTION INTRAVENOUS at 07:12

## 2019-12-05 RX ADMIN — DEXAMETHASONE SODIUM PHOSPHATE 8 MG: 4 INJECTION, SOLUTION INTRAMUSCULAR; INTRAVENOUS at 09:12

## 2019-12-05 RX ADMIN — FENTANYL CITRATE 50 MCG: 50 INJECTION, SOLUTION INTRAMUSCULAR; INTRAVENOUS at 08:12

## 2019-12-05 RX ADMIN — DOCUSATE SODIUM 100 MG: 100 CAPSULE, LIQUID FILLED ORAL at 09:12

## 2019-12-05 RX ADMIN — PROPOFOL 150 MG: 10 INJECTION, EMULSION INTRAVENOUS at 08:12

## 2019-12-05 RX ADMIN — DOCUSATE SODIUM 100 MG: 100 CAPSULE, LIQUID FILLED ORAL at 12:12

## 2019-12-05 RX ADMIN — CEFAZOLIN 2 G: 330 INJECTION, POWDER, FOR SOLUTION INTRAMUSCULAR; INTRAVENOUS at 08:12

## 2019-12-05 RX ADMIN — MUPIROCIN 1 G: 20 OINTMENT TOPICAL at 09:12

## 2019-12-05 RX ADMIN — ACETAMINOPHEN 1000 MG: 500 TABLET, FILM COATED ORAL at 07:12

## 2019-12-05 RX ADMIN — LIDOCAINE HYDROCHLORIDE 50 MG: 20 INJECTION, SOLUTION INTRAVENOUS at 08:12

## 2019-12-05 RX ADMIN — ONDANSETRON 4 MG: 2 INJECTION INTRAMUSCULAR; INTRAVENOUS at 10:12

## 2019-12-05 RX ADMIN — SIMETHICONE CHEW TAB 80 MG 80 MG: 80 TABLET ORAL at 01:12

## 2019-12-05 RX ADMIN — ROCURONIUM BROMIDE 10 MG: 10 INJECTION, SOLUTION INTRAVENOUS at 09:12

## 2019-12-05 RX ADMIN — GLYCOPYRROLATE 0.8 MG: 0.2 INJECTION, SOLUTION INTRAMUSCULAR; INTRAVENOUS at 10:12

## 2019-12-05 RX ADMIN — SODIUM CHLORIDE, SODIUM LACTATE, POTASSIUM CHLORIDE, AND CALCIUM CHLORIDE: 600; 310; 30; 20 INJECTION, SOLUTION INTRAVENOUS at 09:12

## 2019-12-05 NOTE — TRANSFER OF CARE
"Anesthesia Transfer of Care Note    Patient: Brittany Modi    Procedure(s) Performed: Procedure(s) (LRB):  HYSTERECTOMY, TOTAL, LAPAROSCOPIC (N/A)  CYSTOSCOPY (N/A)    Patient location: PACU    Anesthesia Type: general    Transport from OR: Transported from OR on 2-3 L/min O2 by NC with adequate spontaneous ventilation. Continuous SpO2 monitoring in transport    Post pain: adequate analgesia    Post assessment: no apparent anesthetic complications and tolerated procedure well    Post vital signs: stable    Level of consciousness: sedated and responds to stimulation    Nausea/Vomiting: no nausea/vomiting    Complications: none    Transfer of care protocol was followed      Last vitals:   Visit Vitals  BP (!) 177/113 (BP Location: Right arm)   Pulse 87   Temp 36.4 °C (97.6 °F) (Oral)   Resp 20   Ht 5' 5" (1.651 m)   Wt 77.1 kg (170 lb)   SpO2 100%   Breastfeeding? No   BMI 28.29 kg/m²     "

## 2019-12-05 NOTE — ANESTHESIA PROCEDURE NOTES
Intubation  Performed by: Daphne Khan CRNA  Authorized by: Vitor Zuniga MD     Intubation:     Induction:  Intravenous    Intubated:  Postinduction    Mask Ventilation:  Easy mask    Attempts:  1    Attempted By:  CRNA    Method of Intubation:  Video laryngoscopy    Blade:  Soha 3    Laryngeal View Grade: Grade I - full view of chords      Difficult Airway Encountered?: No      Complications:  None    Airway Device:  Oral endotracheal tube    Airway Device Size:  7.5    Style/Cuff Inflation:  Cuffed (inflated to minimal occlusive pressure)    Inflation Amount (mL):  3    Tube secured:  22    Secured at:  The lips    Placement Verified By:  Capnometry    Complicating Factors:  None    Findings Post-Intubation:  BS equal bilateral

## 2019-12-05 NOTE — H&P
64 y.o.  presents for pre-op H&P for TLH/BSO with cystoscope for post menopausal bleed with hyperplasia without atypia and cramping and tenderness. Her ultrasound shows 12 cm stripe with ?fibroid or polyp in the endometrium. She is sick of the bleeding and we have changed her HRT and she does not desire to stop as is miserable off of the HRT.. She has had appendectomy and BTL and abdominoplasty but no other abdominal surgery. She has HTN and had positive stress test in  and am waiting for pre op clearance. She was cleared for surgery. No LMP recorded. Patient is postmenopausal..          Past Medical History:   Diagnosis Date    Arthritis      GERD (gastroesophageal reflux disease)      Heart murmur              Past Surgical History:   Procedure Laterality Date    abdominal surgery         abdominoplasty    APPENDECTOMY             COLONOSCOPY        COLONOSCOPY N/A 2019     Procedure: COLONOSCOPY;  Surgeon: Rose Mary Ervin MD;  Location: [address]);  Service: Endoscopy;  Laterality: N/A;  no PM prep    JOINT REPLACEMENT        KNEE SURGERY Right 12-21-15     TKR    KNEE SURGERY Right 2015     TKR    TUBAL LIGATION         age 30's            Family History   Problem Relation Age of Onset    Diabetes Mother      Hypertension Mother      Hypothyroidism Mother      Asthma Father      Hypertension Sister      Cancer Sister           breast    Breast cancer Sister      Hypertension Brother      Hypertension Sister      Hypertension Sister      Hypertension Brother      Hypertension Son      Colon cancer Neg Hx      Ovarian cancer Neg Hx        Review of patient's allergies indicates:  No Known Allergies     Current Outpatient Medications:     cholecalciferol, vitamin D3, (VITAMIN D3) 5,000 unit Tab, Take 5,000 Units by mouth once daily., Disp: , Rfl:     fish oil-omega-3 fatty acids 300-1,000 mg capsule, Take 2 g by mouth once daily., Disp: , Rfl:      miSOPROStol (CYTOTEC) 200 MCG Tab, Take 1 tablet (200 mcg total) by mouth every 6 (six) hours. for 1 day, Disp: 4 tablet, Rfl: 0    multivitamin capsule, Take 1 capsule by mouth once daily., Disp: , Rfl:     progesterone (PROMETRIUM) 100 MG capsule, Take 1 capsule (100 mg total) by mouth nightly., Disp: 90 capsule, Rfl: 3     Current Facility-Administered Medications:     testosterone cypionate injection 50 mg, 50 mg, Intramuscular, Q28 Days, Geri Azar MD, 50 mg at 10/15/19 1532  Social History               Socioeconomic History    Marital status:        Spouse name: Not on file    Number of children: 2    Years of education: Not on file    Highest education level: Not on file   Occupational History    Occupation: RN   Social Needs    Financial resource strain: Not on file    Food insecurity:       Worry: Not on file       Inability: Not on file    Transportation needs:       Medical: Not on file       Non-medical: Not on file   Tobacco Use    Smoking status: Never Smoker    Smokeless tobacco: Never Used    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at [employer]   Substance and Sexual Activity    Alcohol use: No    Drug use: No    Sexual activity: Yes       Partners: Male       Birth control/protection: None   Lifestyle    Physical activity:       Days per week: Not on file       Minutes per session: Not on file    Stress: Not on file   Relationships    Social connections:       Talks on phone: Not on file       Gets together: Not on file       Attends Druze service: Not on file       Active member of club or organization: Not on file       Attends meetings of clubs or organizations: Not on file       Relationship status: Not on file   Other Topics Concern    Not on file   Social History Narrative     ** Merged History Encounter **                     Last pap do not have one on record but no history of abnormal paps  Last pathology 02/26/19  EMBx simple hyperplasia without atypia  Last ultrasound           CLINICAL HISTORY:  Postmenopausal bleeding    TECHNIQUE:  Transabdominal sonography of the pelvis was performed, followed by transvaginal sonography to better evaluate the uterus and ovaries.    COMPARISON:  None.    FINDINGS:  The uterus is mildly prominent in size and measures 9 x 5.5 x 6.8 cm.  The endometrial stripe is thickened and measures 12mm.  Within the endometrium there is a somewhat subtle hypoechoic region with some internal vascularity.  It measures 2 x 0.8 x 1.2 cm.  Additionally, there are multiple uterine fibroids.  Along the posterior body of the uterus is a 2 x 1.8 x 2 cm submucosal fibroid.  Additional intramural fibroids measure 2.2, 2.1 and 1.7 cm.    The ovaries are normal in size and appearance.    The right ovary measures 1 x 2.1 x 1 cm.   The left ovary measures 1.9 x 1.7 x 0.7cm.    There is appropriate flow in the ovaries.       Impression         The endometrial stripe is thickened in this postmenopausal woman with bleeding, measuring 12 mm.  There is a subtle more focal hypoechoic region within the endometrium containing internal flow and measuring up to 2 cm maximally.  This may represent a polyp or fibroid.  Further evaluation recommended.    Uterine fibroids.    This report was flagged in Epic as abnormal.                Vitals:     10/31/19 1300   BP: 138/86      General Appearance: Alert, appropriate appearance for age. No acute distress, Chest/Respiratory Exam: Normal.   Cardiovascular Exam: regular rate and rhythm, S1, S2 normal, no murmur, click, rub or gallop and regular rate and rhythm   Gastrointestinal Exam: soft, non-tender; bowel sounds normal; no masses,  no organomegaly  Pelvic Exam Female: Vulva and vagina appear normal. Bimanual exam reveals normal uterus and adnexa. she does have some tenderness over the uterus.  Psychiatric Exam: Alert and oriented, appropriate affect.     Assessment: post menopausal bleeding with cramping and hyperplasia without atypia     Plan:  TLH/BSO cystoscope

## 2019-12-05 NOTE — ANESTHESIA POSTPROCEDURE EVALUATION
Anesthesia Post Evaluation    Patient: Brittany Modi    Procedure(s) Performed: Procedure(s) (LRB):  HYSTERECTOMY, TOTAL, LAPAROSCOPIC (N/A)  CYSTOSCOPY (N/A)    Final Anesthesia Type: general    Patient location during evaluation: PACU  Patient participation: Yes- Able to Participate  Level of consciousness: awake and alert  Post-procedure vital signs: reviewed and stable  Pain management: adequate  Airway patency: patent    PONV status at discharge: No PONV  Anesthetic complications: no      Cardiovascular status: blood pressure returned to baseline  Respiratory status: unassisted, spontaneous ventilation and room air  Hydration status: euvolemic  Follow-up not needed.          Vitals Value Taken Time   /79 12/5/2019 11:19 AM   Temp 36.5 °C (97.7 °F) 12/5/2019 11:00 AM   Pulse 75 12/5/2019 11:20 AM   Resp 16 12/5/2019 11:15 AM   SpO2 95 % 12/5/2019 11:20 AM   Vitals shown include unvalidated device data.      Event Time     Out of Recovery 11:21:00          Pain/Heriberto Score: Pain Rating Prior to Med Admin: 0 (12/5/2019  7:20 AM)  Heriberto Score: 10 (12/5/2019 11:15 AM)

## 2019-12-06 VITALS
HEIGHT: 65 IN | BODY MASS INDEX: 28.32 KG/M2 | RESPIRATION RATE: 14 BRPM | HEART RATE: 65 BPM | TEMPERATURE: 98 F | WEIGHT: 170 LBS | OXYGEN SATURATION: 100 % | SYSTOLIC BLOOD PRESSURE: 137 MMHG | DIASTOLIC BLOOD PRESSURE: 64 MMHG

## 2019-12-06 LAB
BASOPHILS # BLD AUTO: 0.02 K/UL (ref 0–0.2)
BASOPHILS NFR BLD: 0.1 % (ref 0–1.9)
DIFFERENTIAL METHOD: ABNORMAL
EOSINOPHIL # BLD AUTO: 0 K/UL (ref 0–0.5)
EOSINOPHIL NFR BLD: 0.1 % (ref 0–8)
ERYTHROCYTE [DISTWIDTH] IN BLOOD BY AUTOMATED COUNT: 13.2 % (ref 11.5–14.5)
HCT VFR BLD AUTO: 34.1 % (ref 37–48.5)
HGB BLD-MCNC: 10.6 G/DL (ref 12–16)
IMM GRANULOCYTES # BLD AUTO: 0.1 K/UL (ref 0–0.04)
IMM GRANULOCYTES NFR BLD AUTO: 0.7 % (ref 0–0.5)
LYMPHOCYTES # BLD AUTO: 1.8 K/UL (ref 1–4.8)
LYMPHOCYTES NFR BLD: 12.4 % (ref 18–48)
MCH RBC QN AUTO: 31.8 PG (ref 27–31)
MCHC RBC AUTO-ENTMCNC: 31.1 G/DL (ref 32–36)
MCV RBC AUTO: 102 FL (ref 82–98)
MONOCYTES # BLD AUTO: 1.5 K/UL (ref 0.3–1)
MONOCYTES NFR BLD: 9.8 % (ref 4–15)
NEUTROPHILS # BLD AUTO: 11.4 K/UL (ref 1.8–7.7)
NEUTROPHILS NFR BLD: 76.9 % (ref 38–73)
NRBC BLD-RTO: 0 /100 WBC
PLATELET # BLD AUTO: 244 K/UL (ref 150–350)
PMV BLD AUTO: 11.8 FL (ref 9.2–12.9)
RBC # BLD AUTO: 3.33 M/UL (ref 4–5.4)
WBC # BLD AUTO: 14.81 K/UL (ref 3.9–12.7)

## 2019-12-06 PROCEDURE — 25000003 PHARM REV CODE 250: Performed by: STUDENT IN AN ORGANIZED HEALTH CARE EDUCATION/TRAINING PROGRAM

## 2019-12-06 PROCEDURE — 85025 COMPLETE CBC W/AUTO DIFF WBC: CPT

## 2019-12-06 PROCEDURE — 36415 COLL VENOUS BLD VENIPUNCTURE: CPT

## 2019-12-06 PROCEDURE — 63600175 PHARM REV CODE 636 W HCPCS: Performed by: STUDENT IN AN ORGANIZED HEALTH CARE EDUCATION/TRAINING PROGRAM

## 2019-12-06 RX ORDER — IBUPROFEN 600 MG/1
600 TABLET ORAL EVERY 6 HOURS PRN
Qty: 30 TABLET | Refills: 0 | Status: SHIPPED | OUTPATIENT
Start: 2019-12-06 | End: 2022-05-27

## 2019-12-06 RX ORDER — TRAMADOL HYDROCHLORIDE 50 MG/1
50 TABLET ORAL EVERY 6 HOURS PRN
Qty: 20 TABLET | Refills: 0 | Status: SHIPPED | OUTPATIENT
Start: 2019-12-06 | End: 2019-12-06 | Stop reason: SDUPTHER

## 2019-12-06 RX ORDER — TRAMADOL HYDROCHLORIDE 50 MG/1
50 TABLET ORAL EVERY 6 HOURS PRN
Qty: 20 TABLET | Refills: 0 | Status: SHIPPED | OUTPATIENT
Start: 2019-12-06 | End: 2021-05-11

## 2019-12-06 RX ADMIN — TRAMADOL HYDROCHLORIDE 50 MG: 50 TABLET, FILM COATED ORAL at 12:12

## 2019-12-06 RX ADMIN — TRAMADOL HYDROCHLORIDE 50 MG: 50 TABLET, FILM COATED ORAL at 06:12

## 2019-12-06 RX ADMIN — IBUPROFEN 600 MG: 600 TABLET, FILM COATED ORAL at 01:12

## 2019-12-06 RX ADMIN — MUPIROCIN 1 G: 20 OINTMENT TOPICAL at 08:12

## 2019-12-06 RX ADMIN — KETOROLAC TROMETHAMINE 30 MG: 30 INJECTION, SOLUTION INTRAMUSCULAR; INTRAVENOUS at 05:12

## 2019-12-06 RX ADMIN — DOCUSATE SODIUM 100 MG: 100 CAPSULE, LIQUID FILLED ORAL at 08:12

## 2019-12-06 RX ADMIN — SIMETHICONE CHEW TAB 80 MG 80 MG: 80 TABLET ORAL at 05:12

## 2019-12-06 NOTE — PROGRESS NOTES
[facility]  Obstetrics & Gynecology  Progress Note    Patient Name: Brittany Modi  MRN: [id number]  Admission Date: 12/5/2019  Primary Care Provider: Mehdi Tian DO  Principal Problem: S/P laparoscopic hysterectomy    Subjective:     Interval History: POD #1. Patient is doing well this morning. Pain is well controlled. Tolerating regular diet without complications. She denies fever/chills, nausea/vomiting, CP, and SOB. Bui in place, draining clear urine. She has not ambulated.      Scheduled Meds:   docusate sodium  100 mg Oral BID    ibuprofen  600 mg Oral Q6H    mupirocin  1 g Nasal BID     Continuous Infusions:   sodium chloride 0.9% 125 mL/hr at 12/05/19 2100     PRN Meds:bisacodyl, diphenhydrAMINE, diphenhydrAMINE, HYDROmorphone, ondansetron, promethazine (PHENERGAN) IVPB, simethicone, traMADol    Review of patient's allergies indicates:  No Known Allergies    Objective:     Vital Signs (Most Recent):  Temp: 98 °F (36.7 °C) (12/06/19 0434)  Pulse: 77 (12/06/19 0434)  Resp: 18 (12/06/19 0434)  BP: 129/62 (12/06/19 0434)  SpO2: 97 % (12/06/19 0434) Vital Signs (24h Range):  Temp:  [97.6 °F (36.4 °C)-98.2 °F (36.8 °C)] 98 °F (36.7 °C)  Pulse:  [69-90] 77  Resp:  [16-20] 18  SpO2:  [97 %-100 %] 97 %  BP: (118-177)/() 129/62     Weight: 77.1 kg (170 lb)  Body mass index is 28.29 kg/m².  No LMP recorded. Patient is postmenopausal.    I&O (Last 24H):    Intake/Output Summary (Last 24 hours) at 12/6/2019 0616  Last data filed at 12/6/2019 0500  Gross per 24 hour   Intake 4191.67 ml   Output 2750 ml   Net 1441.67 ml       Physical Exam:   Constitutional: She is oriented to person, place, and time. She appears well-developed and well-nourished. No distress.    HENT:   Head: Normocephalic and atraumatic.    Eyes: Conjunctivae and EOM are normal.    Neck: Normal range of motion. Neck supple. No thyromegaly present.    Cardiovascular: Normal rate and regular rhythm.      Pulmonary/Chest: Effort normal. No respiratory distress.        Abdominal: Soft. She exhibits no distension. There is no tenderness.   Incisions c/d/i             Musculoskeletal: Normal range of motion and moves all extremeties. She exhibits no edema or tenderness.       Neurological: She is alert and oriented to person, place, and time.    Skin: Skin is warm and dry. No rash noted.    Psychiatric: She has a normal mood and affect. Her behavior is normal.       Laboratory:  CBC:   Recent Labs   Lab 12/06/19  0457   WBC 14.81*   RBC 3.33*   HGB 10.6*   HCT 34.1*      *   MCH 31.8*   MCHC 31.1*         Assessment/Plan:     Active Diagnoses:    Diagnosis Date Noted POA    PRINCIPAL PROBLEM:  S/P laparoscopic hysterectomy [Z90.710] 12/05/2019 No    Post-menopausal bleeding [N95.0] 12/05/2019 Yes      Problems Resolved During this Admission:       1. Post op  - recovering well with no problems  - continue PRN pain meds  - Remove rodrigues this AM  - Post op CBC appropriate at 10/34    Dispo: Anticipate discharge today as patient continues to meet post-operative goals.      Darryn Coyle MD  Obstetrics & Gynecology  [facility]

## 2019-12-06 NOTE — PLAN OF CARE
Pt discharging home today pending milestones. Family to provide transportation home.    Pt confirms no CM needs or barriers for discharge.     12/06/19 0839   Final Note   Assessment Type Final Discharge Note   Anticipated Discharge Disposition Home   What phone number can be called within the next 1-3 days to see how you are doing after discharge? [phone]   Hospital Follow Up  Appt(s) scheduled? Yes   Discharge plans and expectations educations in teach back method with documentation complete? Yes   Right Care Referral Info   Post Acute Recommendation No Care

## 2019-12-06 NOTE — PROGRESS NOTES
AAOX4. VSS.Pt free of trauma, falls, injury and skin breakdown. Puncture sites x 3, WNL.Scant drainage noted on 2 steri strips (middle and left). Peripad in place scant blood noted. Pain controlled w/ PO Tramadol per MD order. Pt has been eating adequately throughout shift. Bui to gravity, draining clear yellow urine.( see flowsheet) Pt repositions self independently. Purposeful hourly rounding. Pt has call light in reach, side rails up X2, bed in low position and SCD's/ nonskid socks on. Pt lying in bed in no distress.

## 2019-12-07 NOTE — DISCHARGE SUMMARY
Pt eager & in agreement w/ DC. VU of DC instructions--paperwork & pain prescription passed & explained. IV removed w/ cath tip intact, WNL. Voiding, ambulating, & tolerating PO well. 3 lap sites, CDI. To be DCd home w/ family--will be escorted downstairs via  transport team once dressed, ready & ride arrives. Free from falls, injury, or skin breakdown this hospital admission. Pt. In NAD.

## 2019-12-10 NOTE — DISCHARGE SUMMARY
[facility]  Obstetrics & Gynecology  Discharge Summary    Patient Name: Brittany Modi  MRN: [id number]  Admission Date: 12/5/2019  Hospital Length of Stay: 0 days  Discharge Date and Time: 12/6/2019  6:10 PM  Attending Physician: No att. providers found   Discharging Provider: Darryn Coyle MD  Primary Care Provider: Mehdi Tian DO    Hospital Course: Patient presented for scheduled procedure. Patient was passed back to OR for TLH. Please see OP note for further details. Tolerated procedure well and patient was taken to recovery in a stable condition. Prior to discharge on POD#1, patient was able to void, ambulate, tolerate PO and pain was well controlled with PO meds. Patient was given routine post-op instructions for which patient voiced understanding. Patient was subsequently discharged home.      Procedure(s) (LRB):  HYSTERECTOMY, TOTAL, LAPAROSCOPIC (N/A)  CYSTOSCOPY (N/A)     Consults:     Significant Diagnostic Studies: Labs: CBC No results for input(s): WBC, HGB, HCT, PLT in the last 48 hours.    Pending Diagnostic Studies:     Procedure Component Value Units Date/Time    Specimen to Pathology, Surgery Gynecology and Obstetrics [[id number]] Collected:  12/05/19 1001    Order Status:  Sent Lab Status:  In process Updated:  12/05/19 1217        Final Active Diagnoses:    Diagnosis Date Noted POA    PRINCIPAL PROBLEM:  S/P laparoscopic hysterectomy [Z90.710] 12/05/2019 No    Post-menopausal bleeding [N95.0] 12/05/2019 Yes      Problems Resolved During this Admission:        Discharged Condition: good    Disposition: Home or Self Care    Follow Up:  Follow-up Information     E Mateus Azar MD. Schedule an appointment as soon as possible for a visit in 6 weeks.    Specialties:  Obstetrics, Obstetrics and Gynecology  Why:  Post op  Contact information:  [address] [id number] [phone]                 Patient Instructions:      Call MD for:  temperature  >100.4     Call MD for:  persistent nausea and vomiting or diarrhea     Call MD for:  severe uncontrolled pain     Call MD for:  redness, tenderness, or signs of infection (pain, swelling, redness, odor or green/yellow discharge around incision site)     Call MD for:  difficulty breathing or increased cough     Call MD for:  severe persistent headache     Call MD for:  persistent dizziness, light-headedness, or visual disturbances     Call MD for:  increased confusion or weakness     Call MD for:  temperature >100.4     Call MD for:  persistent nausea and vomiting or diarrhea     Call MD for:  severe uncontrolled pain     Call MD for:  redness, tenderness, or signs of infection (pain, swelling, redness, odor or green/yellow discharge around incision site)     Call MD for:  difficulty breathing or increased cough     Call MD for:  severe persistent headache     Call MD for:  persistent dizziness, light-headedness, or visual disturbances     Call MD for:  increased confusion or weakness     Pelvic Rest     Activity as tolerated     Medications:  Reconciled Home Medications:      Medication List      START taking these medications    ibuprofen 600 MG tablet  Commonly known as:  ADVIL,MOTRIN  Take 1 tablet (600 mg total) by mouth every 6 (six) hours as needed.     traMADol 50 mg tablet  Commonly known as:  ULTRAM  Take 1 tablet (50 mg total) by mouth every 6 (six) hours as needed for Pain.        CONTINUE taking these medications    fish oil-omega-3 fatty acids 300-1,000 mg capsule  Take 2 g by mouth once daily.     multivitamin capsule  Take 1 capsule by mouth once daily.     nitroglycerin 400 mcg/spray  Commonly known as:  Nitromist  Place 400 mcg (1 spray total) under the tongue every 5 (five) minutes as needed for Chest pain.     Vitamin D3 125 mcg (5,000 unit) Tab  Generic drug:  cholecalciferol (vitamin D3)  Take 5,000 Units by mouth once daily.            Darryn Coyle MD  Obstetrics & Gynecology  [facility]

## 2019-12-12 NOTE — OP NOTE
Operative Note  Obstetrics and Gynecology      Surgery Date: 12/5/2019     Surgeon(s) and Role:     * Geri Azar MD - Primary     * Darryn Coyle MD - Resident - Assisting    Pre-op Diagnosis:  History of postmenopausal bleeding [Z87.42]    Post-op Diagnosis: Post-Op Diagnosis Codes:     * History of postmenopausal bleeding [Z87.42]    Procedure(s) (LRB):  HYSTERECTOMY, TOTAL, LAPAROSCOPIC (N/A)  CYSTOSCOPY (N/A)  Bilateral Salpingo /oophrectomy  Anesthesia: General    Findings:  1.  Uterus slightly enlarged due to fibroids.  Bilateral fallopian tubes and ovaries normal  2.  Postop cystoscopy with bilateral ureteral efflux and no evidence of bladder injury    Procedure in Detail:  Patient was taken to the operating room where general anesthesia was administered and found to be adequate. She was prepped and draped in the dorsal lithotomy position. A weighted sterile speculum was placed in the vagina. The anterior lip of the cervix was grasped with a single tooth tenaculum. The uterus was sounded to approximately 10 cm. A stay suture of 0-Vicryl was placed at 12 o'clock and 6 o'clock on the cervix. A JULIETTE manipulator was placed inside the endometrial cavity.     Gloves were changed. A Veress needle was inserted into the umbilicus under tenting of the anterior abdominal wall. Placement into the peritoneal cavity was confirmed via saline drop test. The abdomen was insufflated to 15mm Hg using Carbon dioxide. A 10 mm intraumbilical skin incision was made with the scalpel. A 10 mm trocar was advanced through this incision under direct visualization. The patient was placed in deep Trendelenburg. 5 mm trocars were placed in the in the right and left lower quadrants under direct visualization.     Attention was turned to the left round ligament. This was cauterized and transected and continued anteriorly to create the bladder flap to the midline. The left fallopian tube was then grasped at the fimbria and  transected the IP ligament was clamped  Triple cauterized and cut with the ligasure.. . This was cauterized and transected and carried down to the level of the uterine vessels. The vessels were cauterized but not transected. Attention was turned to the right round ligament. It was cauterized and transected and continued anteriorly to finish creating the bladder flap. The right fallopian tube was then grasped at the fimbria and the right IP ligament was clamped and triple cauterized and cut in the same fashion of the left.   This was cauterized and transected and carried down to the level of the uterine vessels. The vessels were cauterized but not transected. Attention was turned to the anterior portion of the cervix. The bladder was dissected off the cervix. Attention was turned to the posterior portion of the uterus. The posterior colpotomy was created and carried around to the level of the uterine vessels bilaterally. The left and right uterine vessels were cauterized and transected. The anterior colpotomy was created. This was continued to the level of the uterine vessels bilaterally. The anterior and posterior colpotomies were connected.     The uterus was removed vaginally.  The vaginal cuff was closed with 0 Vicryl suture, with a figure-of-eight at each angle, running locking suture on the posterior cuff with care to catch the peritoneum in the running locking stitch. and running the anterior to posterior cuff together.    The rodrigues catheter was removed. A cystoscope was advanced through the urethra. The bladder was inspected and found to be intact. Both ureters were seen effluxing. The cystoscope was removed and the rodrigues catheter was again inserted through the urethra.     Attention was turned to the anterior abdominal wall. The abdomen was deflated and all trocars were removed. The suture-myron was used to close the umbilical fascia with 0 Vicryl. The skin was closed with 4-0 Monocryl in a subcuticular  fashion. Sponge, lap, and needle counts were correct x 2. The patient was taken to the recovery room in stable condition with the rodrigues draining urine.         Estimated Blood Loss: 150 mL           Specimens (From admission, onward)     Start     Ordered    12/05/19 1000  Specimen to Pathology, Surgery Gynecology and Obstetrics  Once     Question:  Procedure Type:  Answer:  Gynecology and Obstetrics    12/05/19 1001              Implants: * No implants in log *           Disposition: PACU - hemodynamically stable.           Condition: Good    Attestation:  I was present and scrubbed for the entire procedure.      Dylon Coyle MD  PGY-4 OB/GYN  [phone]    I agree with the above.  I was present for entire surgery.

## 2019-12-13 LAB
FINAL PATHOLOGIC DIAGNOSIS: NORMAL
GROSS: NORMAL
MICROSCOPIC EXAM: NORMAL

## 2019-12-17 ENCOUNTER — PATIENT MESSAGE (OUTPATIENT)
Dept: OBSTETRICS AND GYNECOLOGY | Facility: CLINIC | Age: 64
End: 2019-12-17

## 2020-01-08 ENCOUNTER — TELEPHONE (OUTPATIENT)
Dept: OBSTETRICS AND GYNECOLOGY | Facility: CLINIC | Age: 65
End: 2020-01-08

## 2020-01-08 NOTE — TELEPHONE ENCOUNTER
----- Message from Vani Orta sent at 1/8/2020 10:10 AM CST -----  Contact: self  [phone]. Pt had surgery on the 5th of December and was trying to get in to see Dr before going back to work on the 20th of January but there is no appt til Feb 18th

## 2020-01-16 ENCOUNTER — OFFICE VISIT (OUTPATIENT)
Dept: OBSTETRICS AND GYNECOLOGY | Facility: CLINIC | Age: 65
End: 2020-01-16
Attending: OBSTETRICS & GYNECOLOGY
Payer: COMMERCIAL

## 2020-01-16 VITALS
BODY MASS INDEX: 28.91 KG/M2 | HEIGHT: 65 IN | DIASTOLIC BLOOD PRESSURE: 74 MMHG | WEIGHT: 173.5 LBS | SYSTOLIC BLOOD PRESSURE: 116 MMHG

## 2020-01-16 DIAGNOSIS — Z98.890 POST-OPERATIVE STATE: Primary | ICD-10-CM

## 2020-01-16 DIAGNOSIS — Z78.0 MENOPAUSE: ICD-10-CM

## 2020-01-16 PROCEDURE — 99024 POSTOP FOLLOW-UP VISIT: CPT | Mod: S$GLB,,, | Performed by: OBSTETRICS & GYNECOLOGY

## 2020-01-16 PROCEDURE — 99024 PR POST-OP FOLLOW-UP VISIT: ICD-10-PCS | Mod: S$GLB,,, | Performed by: OBSTETRICS & GYNECOLOGY

## 2020-01-16 NOTE — PROGRESS NOTES
Subjective:       Patient ID: Brittany Modi is a 64 y.o. female.    Chief Complaint:  Post-op Evaluation      History of Present Illness  HPI  This 64 yr old P2 female is here for post op exam/eval from her [facility]/BSO .  She had post menopausal bleeding and hyperplasia without atypia and cramping and bleeding.  Path benign with simple non atypical hyperplasia.  She had her last pellet insertion was in  and she is still asymptomatic  Her estradiol level was 60 in OCt.  Will not need more unless she starts to have symptoms.  Will check her level today      GYN & OB History  No LMP recorded. Patient is postmenopausal.   Date of Last Pap: No result found    OB History    Para Term  AB Living   2 2 2     2   SAB TAB Ectopic Multiple Live Births           2      # Outcome Date GA Lbr Alejandro/2nd Weight Sex Delivery Anes PTL Lv   2 Term     M Vag-Spont   JOB   1 Term     F Vag-Spont   JOB       Review of Systems  Review of Systems   Constitutional: Negative for chills and fever.   Respiratory: Negative for shortness of breath.    Cardiovascular: Negative for chest pain.   Gastrointestinal: Negative for abdominal pain, nausea and vomiting.   Genitourinary: Negative for difficulty urinating, dyspareunia, genital sores, menstrual problem, pelvic pain, vaginal bleeding, vaginal discharge and vaginal pain.   Skin: Negative for wound.   Hematological: Negative for adenopathy.           Objective:   Physical Exam  Normal external genetalia  Normal vaginal vault and cuff intact healing well  BME normal BME no masses nontender and intact cuff palpated   Assessment:        1. Post-operative state    2. Menopause               Plan:      Follow up in one yr  Will get estrogen lab today to see where she is but will not replace unless she has symptoms

## 2020-01-16 NOTE — LETTER
January 16, 2020      [address]  [address], [address]  [facility] [address]  Phone: [phone]  Fax: [phone]       Patient: Brittany Modi   YOB: 1955  Date of Visit: 01/16/2020    To Whom It May Concern:    Kristie Modi  was at [facility] on 01/16/2020. She may return to work on 01/20/2020 with no restrictions. If you have any questions or concerns, or if I can be of further assistance, please do not hesitate to contact me.    Sincerely,          Geri Azar MD

## 2020-05-27 ENCOUNTER — TELEPHONE (OUTPATIENT)
Dept: INTERNAL MEDICINE | Facility: CLINIC | Age: 65
End: 2020-05-27

## 2020-05-27 ENCOUNTER — HOSPITAL ENCOUNTER (OUTPATIENT)
Dept: RADIOLOGY | Facility: HOSPITAL | Age: 65
Discharge: HOME | End: 2020-05-27
Attending: INTERNAL MEDICINE
Payer: COMMERCIAL

## 2020-05-27 ENCOUNTER — OFFICE VISIT (OUTPATIENT)
Dept: INTERNAL MEDICINE | Facility: CLINIC | Age: 65
End: 2020-05-27
Payer: COMMERCIAL

## 2020-05-27 VITALS
RESPIRATION RATE: 16 BRPM | SYSTOLIC BLOOD PRESSURE: 158 MMHG | HEIGHT: 65 IN | TEMPERATURE: 98 F | WEIGHT: 181.44 LBS | BODY MASS INDEX: 30.23 KG/M2 | DIASTOLIC BLOOD PRESSURE: 72 MMHG | HEART RATE: 81 BPM

## 2020-05-27 DIAGNOSIS — K59.00 CONSTIPATION, UNSPECIFIED CONSTIPATION TYPE: Primary | ICD-10-CM

## 2020-05-27 PROCEDURE — 3008F BODY MASS INDEX DOCD: CPT | Mod: CPTII,S$GLB,, | Performed by: INTERNAL MEDICINE

## 2020-05-27 PROCEDURE — 74018 RADEX ABDOMEN 1 VIEW: CPT | Mod: 26,,, | Performed by: RADIOLOGY

## 2020-05-27 PROCEDURE — 99213 OFFICE O/P EST LOW 20 MIN: CPT | Mod: S$GLB,,, | Performed by: INTERNAL MEDICINE

## 2020-05-27 PROCEDURE — 99999 PR PBB SHADOW E&M-EST. PATIENT-LVL III: CPT | Mod: PBBFAC,,, | Performed by: INTERNAL MEDICINE

## 2020-05-27 PROCEDURE — 99213 PR OFFICE/OUTPT VISIT, EST, LEVL III, 20-29 MIN: ICD-10-PCS | Mod: S$GLB,,, | Performed by: INTERNAL MEDICINE

## 2020-05-27 PROCEDURE — 99999 PR PBB SHADOW E&M-EST. PATIENT-LVL III: ICD-10-PCS | Mod: PBBFAC,,, | Performed by: INTERNAL MEDICINE

## 2020-05-27 PROCEDURE — 3008F PR BODY MASS INDEX (BMI) DOCUMENTED: ICD-10-PCS | Mod: CPTII,S$GLB,, | Performed by: INTERNAL MEDICINE

## 2020-05-27 PROCEDURE — 74018 XR ABDOMEN AP 1 VIEW: ICD-10-PCS | Mod: 26,,, | Performed by: RADIOLOGY

## 2020-05-27 PROCEDURE — 74018 RADEX ABDOMEN 1 VIEW: CPT | Mod: TC,PO

## 2020-05-27 NOTE — TELEPHONE ENCOUNTER
----- Message from Ronal David sent at 5/27/2020  3:30 PM CDT -----  Contact: Self [phone]  Patient would like an call back form the nurse in regards to medication, please advise.

## 2020-05-27 NOTE — PROGRESS NOTES
CC:  Constipation and abdominal pain  HPI:  The patient is a 64 y.o. year old female who presents to the office for constipation and abdominal pain.  Her symptoms started worsened about 2 months.  She denies any changes in diet or activity.  She reports gas and decreased appetite and bloating.  She takes miralax daily, usually only has a stool once a week.  She also complains of poor memory.  Her last colonoscopy was last year.    PAST MEDICAL HISTORY:  Past Medical History:   Diagnosis Date    Arthritis     Atrial fibrillation 2017    Chest pain     GERD (gastroesophageal reflux disease)     Heart murmur        SURGICAL HISTORY:  Past Surgical History:   Procedure Laterality Date    abdominal surgery      abdominoplasty    APPENDECTOMY      2010    COLONOSCOPY      COLONOSCOPY N/A 8/22/2019    Procedure: COLONOSCOPY;  Surgeon: Rose Mary Ervin MD;  Location: [address]);  Service: Endoscopy;  Laterality: N/A;  no PM prep    CYSTOSCOPY N/A 12/5/2019    Procedure: CYSTOSCOPY;  Surgeon: Geri Azar MD;  Location: [facility];  Service: OB/GYN;  Laterality: N/A;    JOINT REPLACEMENT      KNEE SURGERY Right 12-21-15    TKR    KNEE SURGERY Right 12/2015    TKR    LAPAROSCOPIC TOTAL HYSTERECTOMY N/A 12/5/2019    Procedure: HYSTERECTOMY, TOTAL, LAPAROSCOPIC;  Surgeon: Geri Azar MD;  Location: [facility];  Service: OB/GYN;  Laterality: N/A;    TUBAL LIGATION      age 30's       MEDS:  Medcard reviewed and updated    ALLERGIES: Allergy Card reviewed and updated    SOCIAL HISTORY:   The patient is a nonsmoker.    PE:   APPEARANCE: Well nourished, well developed, in no acute distress.    CHEST: Lungs clear to auscultation with unlabored respirations.  CARDIOVASCULAR: Normal S1, S2. No murmurs. No carotid bruits. No pedal edema.  ABDOMEN: Bowel sounds normal. Not distended. Soft. No tenderness or masses.   PSYCHIATRIC: The patient is oriented to person, place, and time and has a pleasant  affect.        ASSESSMENT/PLAN:  Brittany was seen today for abdominal pain.    Diagnoses and all orders for this visit:    Constipation, unspecified constipation type  -     linaCLOtide (LINZESS) 145 mcg Cap capsule; Take 1 capsule (145 mcg total) by mouth once daily.  -     X-Ray Abdomen AP 1 View; Future

## 2020-06-01 ENCOUNTER — TELEPHONE (OUTPATIENT)
Dept: INTERNAL MEDICINE | Facility: CLINIC | Age: 65
End: 2020-06-01

## 2020-06-01 RX ORDER — LACTULOSE 10 G/15ML
10 SOLUTION ORAL DAILY
Qty: 150 ML | Refills: 2 | Status: SHIPPED | OUTPATIENT
Start: 2020-06-01 | End: 2020-06-11

## 2020-06-01 NOTE — TELEPHONE ENCOUNTER
----- Message from Efrain Mchugh sent at 6/1/2020  9:07 AM CDT -----  Contact: Brittany- [phone]  Pharmacy is calling to change an RX.  RX name:  linaCLOtide (LINZESS) 145 mcg Cap capsule  What do they need to clarify:  Pt wants new medication, b/c PA has not been completed. She wants the lactulose  Additional comments: Please call to advise.

## 2020-06-03 ENCOUNTER — TELEPHONE (OUTPATIENT)
Dept: INTERNAL MEDICINE | Facility: CLINIC | Age: 65
End: 2020-06-03

## 2020-06-03 NOTE — TELEPHONE ENCOUNTER
----- Message from Summer Donaldson sent at 6/3/2020  2:46 PM CDT -----  Contact:  Patient [phone]  Patient needs prior authorization for linaCLOtide (LINZESS) 145 mcg Cap capsule. Please call to discuss    lactulose  is not working

## 2020-06-04 NOTE — PROGRESS NOTES
"Subjective:       Patient ID: Brittany Modi is a 63 y.o. female.    Vitals:  height is 5' 5" (1.651 m) and weight is 81.6 kg (180 lb). Her temperature is 98.3 °F (36.8 °C). Her blood pressure is 144/70 (abnormal) and her pulse is 66. Her respiration is 16 and oxygen saturation is 100%.     Chief Complaint: Urinary Tract Infection     Patient states that 3 days ago she noticed a thick white vaginal discharge.  She states that discharge is not as thick and white any more, now it is thin and more darker colored.  She denies any odor.  Denies vaginal itching but admits to some vaginal irritation.  She also has burning with urination, frequency, urgency that began around the same time.   Denies flank pain, fever.  She is  in a monogamous relationship and only has sex with her partner.   No new soaps, shampoos, lubrication, increased sex, etc.      Patient denies recent antibiotic use- she states that the last time she had antibiotics was about a month ago.  She denies history of BV or STDs.   She denies vaginal bleeding.  She states that she had an episode of some spotting a few months ago because she was prescribed progesterone by her gynecologist, she has since stopped it because of the spotting.           Constitution: Negative for appetite change, sweating, fatigue and fever.   Neck: Negative for neck pain, neck stiffness and painful lymph nodes.   Cardiovascular: Negative for chest pain.   Gastrointestinal: Negative for abdominal pain, nausea and vomiting.   Genitourinary: Positive for dysuria, frequency, urgency and vaginal discharge. Negative for urine decreased, flank pain, bladder incontinence, hematuria, history of kidney stones, painful menstruation, irregular menstruation, missed menses, heavy menstrual bleeding, ovarian cysts, genital trauma, vaginal pain, vaginal bleeding, vaginal odor, painful intercourse, genital sore, painful ejaculation and pelvic pain.   Musculoskeletal: Negative for back " pain.   Skin: Negative for lesion.   Hematologic/Lymphatic: Negative for swollen lymph nodes.       Objective:      Physical Exam   Constitutional: She is oriented to person, place, and time. She appears well-developed and well-nourished.   HENT:   Head: Normocephalic and atraumatic.   Right Ear: External ear normal.   Left Ear: External ear normal.   Nose: Nose normal. No nasal deformity. No epistaxis.   Mouth/Throat: Oropharynx is clear and moist and mucous membranes are normal.   Eyes: Conjunctivae and lids are normal.   Neck: Trachea normal, normal range of motion and phonation normal. Neck supple.   Cardiovascular: Normal rate, regular rhythm, normal heart sounds and normal pulses. Exam reveals no gallop and no friction rub.   No murmur heard.  Pulmonary/Chest: Effort normal and breath sounds normal. No stridor. No respiratory distress. She has no wheezes. She has no rales. She exhibits no tenderness.   Abdominal: Soft. Normal appearance and bowel sounds are normal. She exhibits no distension and no mass. There is no tenderness. There is no rigidity, no rebound, no guarding, no CVA tenderness, no tenderness at McBurney's point and negative Phillips's sign.   Genitourinary: No labial fusion. There is no rash, tenderness, lesion or injury on the right labia. There is no rash, tenderness, lesion or injury on the left labia. Cervix exhibits no motion tenderness. Right adnexum displays no mass and no tenderness. Left adnexum displays no mass and no tenderness. No erythema, tenderness or bleeding in the vagina. No foreign body in the vagina. No signs of injury around the vagina. Vaginal discharge (  White) found.   Neurological: She is alert and oriented to person, place, and time.   Skin: Skin is warm, dry and intact.   Psychiatric: She has a normal mood and affect. Her speech is normal and behavior is normal. Cognition and memory are normal.   Nursing note and vitals reviewed.        Results for orders placed or  performed in visit on 02/01/19   POCT Urinalysis, Dipstick, Automated, W/O Scope   Result Value Ref Range    POC Blood, Urine Negative Negative    POC Bilirubin, Urine Negative Negative    POC Urobilinogen, Urine Normal 0.1 - 1.1    POC Ketones, Urine Negative Negative    POC Protein, Urine Negative Negative    POC Nitrates, Urine Negative Negative    POC Glucose, Urine Negative Negative    pH, UA 6.0 5 - 8    POC Specific Gravity, Urine 1.020 1.003 - 1.029    POC Leukocytes, Urine Positive (A) Negative       Assessment:       1. Urinary tract infection without hematuria, site unspecified    2. Vaginal discharge        Plan:         Urinary tract infection without hematuria, site unspecified  -     POCT Urinalysis, Dipstick, Automated, W/O Scope  -     Urine culture  -     nitrofurantoin, macrocrystal-monohydrate, (MACROBID) 100 MG capsule; Take 1 capsule (100 mg total) by mouth 2 (two) times daily. for 5 days  Dispense: 10 capsule; Refill: 0    Vaginal discharge  -     NuSwab Vaginitis Plus (VG+)  -     fluconazole (DIFLUCAN) 150 MG Tab; Take 1 tablet (150 mg total) by mouth once daily. for 1 dose  Dispense: 1 tablet; Refill: 1      Patient Instructions   - Rest.    - Drink plenty of fluids.    - Tylenol or Ibuprofen as directed as needed for fever/pain.      - You have been given an antibiotic to treat your condition today.    - Please complete the antibiotic as directed on the bottle.   - If you are female and on oral birth control pills, use additional methods to prevent pregnancy while on antibiotics and for one cycle after.     - You can take over the counter AZO as prescribed for discomfort with urination.     -   Fluconazole is a yeast infection medication to treat yeast infection.  The treatment is just 1 pill ( 150 mg) taken once.  I gave the 1 refill in case you get an antibiotic-induced yeast infection.    - we will call you in the next 3-7 days regarding your lab results    - Follow up with your PCP or  gynecologist as directed in the next 1-2 weeks if not improved or as needed.  You can call [phone] to schedule an appointment with the appropriate provider.    - Go to the ER if your symptoms worsen.    - You must understand that you have received an Urgent Care treatment only and that you may be released before all of your medical problems are known or treated.   - You, the patient, will arrange for follow up care as instructed.   - If your condition worsens or fails to improve we recommend that you receive another evaluation at the ER immediately or contact your PCP to discuss your concerns or return here.       Urinary Tract Infections in Women    Urinary tract infections (UTIs) are most often caused by bacteria (germs). These bacteria enter the urinary tract. The bacteria may come from outside the body. Or they may travel from the skin outside the rectum or vagina into the urethra. Female anatomy makes it easy for bacteria from the bowel to enter a womans urinary tract, which is the most common source of UTI. This means women develop UTIs more often than men. Pain in or around the urinary tract is a common UTI symptom. But the only way to know for sure if you have a UTI for the healthcare provider to test your urine. The two tests that may be done are the urinalysis and urine culture.  Types of UTIs  · Cystitis: A bladder infection (cystitis) is the most common UTI in women. You may have urgent or frequent urination. You may also have pain, burning when you urinate, and bloody urine.  · Urethritis: This is an inflamed urethra, which is the tube that carries urine from the bladder to outside the body. You may have lower stomach or back pain. You may also have urgent or frequent urination.  · Pyelonephritis: This is a kidney infection. If not treated, it can be serious and damage your kidneys. In severe cases, you may be hospitalized. You may have a fever and lower back pain.  Medicines to treat a  UTI  Most UTIs are treated with antibiotics. These kill the bacteria. The length of time you need to take them depends on the type of infection. It may be as short as 3 days. If you have repeated UTIs, a low-dose antibiotic may be needed for several months. Take antibiotics exactly as directed. Dont stop taking them until all of the medicine is gone. If you stop taking the antibiotic too soon, the infection may not go away, and you may develop a resistance to the antibiotic. This can make it much harder to treat.  Lifestyle changes to treat and prevent UTIs  The lifestyle changes below will help get rid of your UTI. They may also help prevent future UTIs.  · Drink plenty of fluids. This includes water, juice, or other caffeine-free drinks. Fluids help flush bacteria out of your body.  · Empty your bladder. Always empty your bladder when you feel the urge to urinate. And always urinate before going to sleep. Urine that stays in your bladder can lead to infection. Try to urinate before and after sex as well.  · Practice good personal hygiene. Wipe yourself from front to back after using the toilet. This helps keep bacteria from getting into the urethra.  · Use condoms during sex. These help prevent UTIs caused by sexually transmitted bacteria. Also, avoid using spermicides during sex. These can increase the risk of UTIs. Choose other forms of birth control instead. For women who tend to get UTIs after sex, a low-dose of a preventive antibiotic may be used. Be sure to discuss this option with your healthcare provider.  · Follow up with your healthcare provider as directed. He or she may test to make sure the infection has cleared. If needed, more treatment may be started.  Date Last Reviewed: 1/1/2017  © 2837-6774 [employer]. [address] [place] [address], [place] [address]. All rights reserved. This information is not intended as a substitute for professional medical care. Always follow your healthcare  professional's instructions.        Candida Vaginal Infection    You have a Candida vaginal infection. This is also known as a yeast infection. It is most often caused by a type of yeast (fungus) called Candida. Candida are normally found in the vagina. But if they increase in number, this can lead to infection and cause symptoms.  Symptoms of a yeast infection can include:  · Clumpy or thin, white discharge, which may look like cottage cheese  · Itching or burning  · Burning with urination  Certain factors can make a yeast infection more likely. These can include:  · Taking certain medicines, such as antibiotics or birth control pills  · Pregnancy  · Diabetes  · Weakened immune system  A yeast infection is most often treated with antifungal medicine. This may be given as a vaginal cream or pills you take by mouth. Treatment may last for about 1 to 7 days. Women with severe or recurrent infections may need longer courses of treatment.  Home care  · If youre prescribed medicine, be sure to use it as directed. Finish all of the medicine, even if your symptoms go away. Note: Dont try to treat yourself using over-the-counter products without talking to your provider first. He or she will let you know if this is a good option for you.  · Ask your provider what steps you can take to help reduce your risk of having a yeast infection in the future.  Follow-up care  Follow up with your healthcare provider, or as directed.  When to seek medical advice  Call your healthcare provider right away if:  · You have a fever of 100.4ºF (38ºC) or higher, or as directed by your provider.  · Your symptoms worsen, or they dont go away within a few days of starting treatment.  · You have new pain in the lower belly or pelvic region.  · You have side effects that bother you or a reaction to the cream or pills youre prescribed.  · You or any partners you have sex with have new symptoms, such as a rash, joint pain, or sores.  Date Last  Reviewed: 7/30/2015 © 2000-2017 [employer]. [address] [place] [address], [place] [address]. All rights reserved. This information is not intended as a substitute for professional medical care. Always follow your healthcare professional's instructions.        Vaginal Infection: Understanding the Vaginal Environment  The vagina is a canal. It connects the uterus (womb) to the outside of the body. It is home to many types of bacteria and other tiny organisms. These different bacteria most often stay balanced in number. This keeps the vagina healthy. If the balance changes, it can cause infection.   A healthy environment  Many types of bacteria are present in a healthy vagina. When balanced, they dont cause problems. Small amounts of yeast may also be present without causing problems. The most common type of bacteria in the vagina is lactobacillus. It helps keep the vagina at a low pH. A low pH keeps bad bacteria from taking over.  Normal vaginal discharge  The vagina makes fluid. It is sent out as discharge. This also keeps the vagina healthy. Normal discharge can be clear, white, or yellowish. Most women find that normal discharge varies in amount and color through the month.  An unhealthy environment  The vaginal environment may get out of balance. This may result in a vaginal infection. There are a few reasons this can happen. The pH may have changed. The amount of one organism, such as yeast, may increase. Or an outside organism may get into the vagina and throw off the balance:  · Bacterial vaginosis (BV). BV is due to an imbalance in the normal bacteria in the vagina. Lactobacillus bacteria decrease. As a result, the numbers of bad bacteria increase.  · Candidiasis (yeast infection). Yeast is a type of fungus. A yeast infection occurs when yeast cells in the vagina increase. They then attack vaginal tissues. A type of yeast called Candida albicans is often involved.  · Trichomoniasis (trich). Trich  is a parasite. It is passed from one person to another during sex. Men with trich often dont have any symptoms. In women, it can take weeks or months before symptoms appear.  Date Last Reviewed: 3/1/2017  © 0322-6442 [employer]. [address] [place] [address], [place] [address]. All rights reserved. This information is not intended as a substitute for professional medical care. Always follow your healthcare professional's instructions.                The patient is a 23y Female complaining of pregnancy problem.

## 2020-06-08 ENCOUNTER — TELEPHONE (OUTPATIENT)
Dept: INTERNAL MEDICINE | Facility: CLINIC | Age: 65
End: 2020-06-08

## 2020-06-08 DIAGNOSIS — K59.00 CONSTIPATION, UNSPECIFIED CONSTIPATION TYPE: Primary | ICD-10-CM

## 2020-06-08 NOTE — TELEPHONE ENCOUNTER
----- Message from Shannon Ireland MA sent at 6/8/2020 10:57 AM CDT -----  Contact: self/ [phone]  It looks like you sent a request to the pharmacy for Linzess PA  Please call pt and let her know the what is going on with the PA        ----- Message -----  From: Pooja Kumar LPN  Sent: 6/6/2020  11:10 AM CDT  To: Shannon Ireland MA        ----- Message -----  From: Anjana Betts  Sent: 6/6/2020  10:15 AM CDT  To: Jaylan FLORES Staff    Patient is returning a phone call.  Who left a message for the patient: Shannon Ireland MA    Does patient know what this is regarding:    Comments: Contact:  Patient [phone]  Patient needs prior authorization for linaCLOtide (LINZESS) 145 mcg Cap capsule. Please call to discuss     lactulose  is not working     Patient is upset , she states she has not have a call back and she need her medication, please call and advise. Patient states she was seen on 5/27.

## 2020-06-08 NOTE — TELEPHONE ENCOUNTER
----- Message from Shannon Ireland MA sent at 6/3/2020  4:38 PM CDT -----  Contact:  Patient [phone]      ----- Message -----  From: Summer Donaldson  Sent: 6/3/2020   2:46 PM CDT  To: Jaylan FLORES Staff    Patient needs prior authorization for linaCLOtide (LINZESS) 145 mcg Cap capsule. Please call to discuss    lactulose  is not working

## 2020-06-09 NOTE — TELEPHONE ENCOUNTER
Spoke to patient , she had seen Dr. Meneses and she ordered Linzess , since the lactulose was not working- was not approved.    Can you order something else? Does not want to see Gideon again.

## 2020-06-10 NOTE — TELEPHONE ENCOUNTER
Prior auth completed and denied since pt doesn't have chronic constipation or IBS.     Please provide alternative medication

## 2020-07-16 ENCOUNTER — OFFICE VISIT (OUTPATIENT)
Dept: URGENT CARE | Facility: CLINIC | Age: 65
End: 2020-07-16
Payer: COMMERCIAL

## 2020-07-16 VITALS
OXYGEN SATURATION: 98 % | HEART RATE: 80 BPM | WEIGHT: 170 LBS | BODY MASS INDEX: 28.32 KG/M2 | TEMPERATURE: 98 F | HEIGHT: 65 IN | RESPIRATION RATE: 18 BRPM

## 2020-07-16 DIAGNOSIS — Z20.822 CLOSE EXPOSURE TO COVID-19 VIRUS: Primary | ICD-10-CM

## 2020-07-16 LAB — SARS-COV-2 RNA RESP QL NAA+PROBE: NOT DETECTED

## 2020-07-16 PROCEDURE — 99214 PR OFFICE/OUTPT VISIT, EST, LEVL IV, 30-39 MIN: ICD-10-PCS | Mod: S$GLB,,, | Performed by: NURSE PRACTITIONER

## 2020-07-16 PROCEDURE — U0003 INFECTIOUS AGENT DETECTION BY NUCLEIC ACID (DNA OR RNA); SEVERE ACUTE RESPIRATORY SYNDROME CORONAVIRUS 2 (SARS-COV-2) (CORONAVIRUS DISEASE [COVID-19]), AMPLIFIED PROBE TECHNIQUE, MAKING USE OF HIGH THROUGHPUT TECHNOLOGIES AS DESCRIBED BY CMS-2020-01-R: HCPCS

## 2020-07-16 PROCEDURE — 99214 OFFICE O/P EST MOD 30 MIN: CPT | Mod: S$GLB,,, | Performed by: NURSE PRACTITIONER

## 2020-07-16 RX ORDER — ACETAMINOPHEN 500 MG
500 TABLET ORAL EVERY 6 HOURS PRN
COMMUNITY
End: 2021-07-08

## 2020-07-16 NOTE — LETTER
[address] [facility] ? [place], [address] ? Phone [phone] ? Fax [phone]           Return to Work/School    Patient: Brittany Modi  YOB: 1955   Date: 07/16/2020      To Whom It May Concern:     Brittany Modi was in contact with/seen in my office on 07/16/2020. COVID-19 is present in our communities across the state. Not all patients are eligible or appropriate to be tested. In this situation, your employee meets the following criteria:     Brittany Modi has met the criteria for COVID-19 testing based upon symptoms, travel, and/or potential exposure. The test has been completed and is pending results at this time. During this time the employee is not able to work and should be quarantined per the Centers for Disease Control timelines.      If you have any questions or concerns, or if I can be of further assistance, please do not hesitate to contact me.     Sincerely,    Rosey Pollack NP

## 2020-07-16 NOTE — PATIENT INSTRUCTIONS
Someone will call you from [facility] within 3-5 days to discuss lab results.       Instructions for Patients with Confirmed or Suspected COVID-19    If you are awaiting your test result, you will either be called or it will be released to the patient portal.  If you have any questions about your test, please visit [email or web address] or call our COVID-19 information line at [phone].       Stay home and stay away from family members and friends. The CDC says, you can leave home after these three things have happened: 1) You have had no fever for at least 72 hours (that is three full days of no fever without the use of medicine that reduces fevers) 2) AND other symptoms have improved (for example, when your cough or shortness of breath have improved) 3) AND at least 10 days have passed since your symptoms first appeared.   Separate yourself from other people and animals in your home.   Call ahead before visiting your doctor.   Wear a facemask.   Cover your coughs and sneezes.   Wash your hands often with soap and water; hand  can be used, too.   Avoid sharing personal household items.   Wipe down surfaces used daily.   Monitor your symptoms. Seek prompt medical attention if your illness is worsening (e.g., difficulty breathing).    Before seeking care, call your healthcare provider.   If you have a medical emergency and need to call 911, notify the dispatch personnel that you have, or are being evaluated for COVID-19. If possible, put on a facemask before emergency medical services arrive.        Recommended precautions for household members, intimate partners, and caregivers in a home setting of a patient with symptomatic laboratory-confirmed COVID-19 or a patient under investigation.  Household members, intimate partners, and caregivers in the home setting awaiting tests results have close contact with a person with symptomatic, laboratory-confirmed COVID-19 or a person under  investigation. Close contacts should monitor their health; they should call their provider right away if they develop symptoms suggestive of COVID-19 (e.g., fever, cough, shortness of breath).    Close contacts should also follow these recommendations:   Make sure that you understand and can help the patient follow their provider's instructions for medication(s) and care. You should help the patient with basic needs in the home and provide support for getting groceries, prescriptions, and other personal needs.   Monitor the patient's symptoms. If the patient is getting sicker, call his or her healthcare provider and tell them that the patient has laboratory-confirmed COVID-19. If the patient has a medical emergency and you need to call 911, notify the dispatch personnel that the patient has, or is being evaluated for COVID-19.   Household members should stay in another room or be  from the patient. Household members should use a separate bedroom and bathroom, if available.   Prohibit visitors.   Household members should care for any pets in the home.   Make sure that shared spaces in the home have good air flow, such as by an air conditioner or an opened window, weather permitting.   Perform hand hygiene frequently. Wash your hands often with soap and water for at least 20 seconds or use an alcohol-based hand  (that contains > 60% alcohol) covering all surfaces of your hands and rubbing them together until they feel dry. Soap and water should be used preferentially.   Avoid touching your eyes, nose, and mouth.   The patient should wear a facemask. If the patient is not able to wear a facemask (for example, because it causes trouble breathing), caregivers should wear a mask when they are in the same room as the patient.   Wear a disposable facemask and gloves when you touch or have contact with the patient's blood, stool, or body fluids, such as saliva, sputum, nasal mucus, vomit,  urine.  o Throw out disposable facemasks and gloves after using them. Do not reuse.  o When removing personal protective equipment, first remove and dispose of gloves. Then, immediately clean your hands with soap and water or alcohol-based hand . Next, remove and dispose of facemask, and immediately clean your hands again with soap and water or alcohol-based hand .   You should not share dishes, drinking glasses, cups, eating utensils, towels, bedding, or other items with the patient. After the patient uses these items, you should wash them thoroughly (see below Wash laundry thoroughly).   Clean all high-touch surfaces, such as counters, tabletops, doorknobs, bathroom fixtures, toilets, phones, keyboards, tablets, and bedside tables, every day. Also, clean any surfaces that may have blood, stool, or body fluids on them.   Use a household cleaning spray or wipe, according to the label instructions. Labels contain instructions for safe and effective use of the cleaning product including precautions you should take when applying the product, such as wearing gloves and making sure you have good ventilation during use of the product.   Wash laundry thoroughly.  o Immediately remove and wash clothes or bedding that have blood, stool, or body fluids on them.  o Wear disposable gloves while handling soiled items and keep soiled items away from your body. Clean your hands (with soap and water or an alcohol-based hand ) immediately after removing your gloves.  o Read and follow directions on labels of laundry or clothing items and detergent. In general, using a normal laundry detergent according to washing machine instructions and dry thoroughly using the warmest temperatures recommended on the clothing label.   Place all used disposable gloves, facemasks, and other contaminated items in a lined container before disposing of them with other household waste. Clean your hands (with soap and  water or an alcohol-based hand ) immediately after handling these items. Soap and water should be used preferentially if hands are visibly dirty.   Discuss any additional questions with your state or local health department or healthcare provider. Check available hours when contacting your local health department.    For more information see CDC link below.      https://www.cdc.gov/coronavirus/2019-ncov/hcp/guidance-prevent-spread.html#precautions        Sources:  CDC, [place] [facility] and [facility]    If you were prescribed a narcotic or controlled medication, do not drive or operate heavy equipment or machinery while taking these medications.  You must understand that you've received an Urgent Care treatment only and that you may be released before all your medical problems are known or treated. You, the patient, will arrange for follow up care as instructed.  Follow up with your PCP or specialty clinic as directed within 2-5 days if not improved or as needed.  You can call [phone] to schedule an appointment with the appropriate provider.  If your condition worsens we recommend that you receive another evaluation at the emergency room immediately or contact your primary medical clinics after hours call service to discuss your concerns.  Please return here or go to the Emergency Department for any concerns or worsening of condition.

## 2020-07-16 NOTE — PROGRESS NOTES
"Subjective:       Patient ID: Brittany Modi is a 64 y.o. female.    Vitals:  height is 5' 5" (1.651 m) and weight is 77.1 kg (170 lb). Her temperature is 98.3 °F (36.8 °C). Her pulse is 80. Her respiration is 18 and oxygen saturation is 98%.     Chief Complaint: Headache    Pt presents with HA/weakness/lost of appetite, exposed to + COVID pt on 7/13/2020    Headache   This is a new problem. The current episode started yesterday. The problem occurs constantly. Associated symptoms include coughing and muscle aches. Pertinent negatives include no ear pain, eye redness, fever, nausea, sinus pressure, sore throat or vomiting.       Constitution: Positive for appetite change, chills and sweating. Negative for fatigue and fever.   HENT: Negative for ear pain, congestion, sinus pain, sinus pressure, sore throat and voice change.    Neck: Negative for painful lymph nodes.   Eyes: Negative for eye redness.   Respiratory: Positive for cough. Negative for chest tightness, sputum production, bloody sputum, COPD, shortness of breath, stridor, wheezing and asthma.    Gastrointestinal: Negative for nausea and vomiting.   Musculoskeletal: Positive for muscle ache.   Skin: Negative for rash.   Allergic/Immunologic: Negative for seasonal allergies and asthma.   Neurological: Positive for headaches.   Hematologic/Lymphatic: Negative for swollen lymph nodes.       Objective:      Physical Exam.  Patient was seen remotely due to State of Emergency for the COVID-19 outbreak.  Counseled patient and answered questions in regards to COVID-19 testing and diagnosis. Patient agreed to remote visit. Patient spoke clear and without difficulty via remote visit. Patient reports headache, fatigue, loss of appetite and fever. Patient reported close contact with +COVID co-worker. Patient denies any distress at this time. ER precautions discussed. Patient verbalized understanding.         Assessment:       1. Close Exposure to Covid-19 Virus      "   Plan:         Close Exposure to Covid-19 Virus  -     COVID-19 Routine Screening      Patient Instructions   Someone will call you from [facility] within 3-5 days to discuss lab results.       Instructions for Patients with Confirmed or Suspected COVID-19    If you are awaiting your test result, you will either be called or it will be released to the patient portal.  If you have any questions about your test, please visit [email or web address] or call our COVID-19 information line at [phone].       Stay home and stay away from family members and friends. The CDC says, you can leave home after these three things have happened: 1) You have had no fever for at least 72 hours (that is three full days of no fever without the use of medicine that reduces fevers) 2) AND other symptoms have improved (for example, when your cough or shortness of breath have improved) 3) AND at least 10 days have passed since your symptoms first appeared.   Separate yourself from other people and animals in your home.   Call ahead before visiting your doctor.   Wear a facemask.   Cover your coughs and sneezes.   Wash your hands often with soap and water; hand  can be used, too.   Avoid sharing personal household items.   Wipe down surfaces used daily.   Monitor your symptoms. Seek prompt medical attention if your illness is worsening (e.g., difficulty breathing).    Before seeking care, call your healthcare provider.   If you have a medical emergency and need to call 911, notify the dispatch personnel that you have, or are being evaluated for COVID-19. If possible, put on a facemask before emergency medical services arrive.        Recommended precautions for household members, intimate partners, and caregivers in a home setting of a patient with symptomatic laboratory-confirmed COVID-19 or a patient under investigation.  Household members, intimate partners, and caregivers in the home setting awaiting tests  results have close contact with a person with symptomatic, laboratory-confirmed COVID-19 or a person under investigation. Close contacts should monitor their health; they should call their provider right away if they develop symptoms suggestive of COVID-19 (e.g., fever, cough, shortness of breath).    Close contacts should also follow these recommendations:   Make sure that you understand and can help the patient follow their provider's instructions for medication(s) and care. You should help the patient with basic needs in the home and provide support for getting groceries, prescriptions, and other personal needs.   Monitor the patient's symptoms. If the patient is getting sicker, call his or her healthcare provider and tell them that the patient has laboratory-confirmed COVID-19. If the patient has a medical emergency and you need to call 911, notify the dispatch personnel that the patient has, or is being evaluated for COVID-19.   Household members should stay in another room or be  from the patient. Household members should use a separate bedroom and bathroom, if available.   Prohibit visitors.   Household members should care for any pets in the home.   Make sure that shared spaces in the home have good air flow, such as by an air conditioner or an opened window, weather permitting.   Perform hand hygiene frequently. Wash your hands often with soap and water for at least 20 seconds or use an alcohol-based hand  (that contains > 60% alcohol) covering all surfaces of your hands and rubbing them together until they feel dry. Soap and water should be used preferentially.   Avoid touching your eyes, nose, and mouth.   The patient should wear a facemask. If the patient is not able to wear a facemask (for example, because it causes trouble breathing), caregivers should wear a mask when they are in the same room as the patient.   Wear a disposable facemask and gloves when you touch or have  contact with the patient's blood, stool, or body fluids, such as saliva, sputum, nasal mucus, vomit, urine.  o Throw out disposable facemasks and gloves after using them. Do not reuse.  o When removing personal protective equipment, first remove and dispose of gloves. Then, immediately clean your hands with soap and water or alcohol-based hand . Next, remove and dispose of facemask, and immediately clean your hands again with soap and water or alcohol-based hand .   You should not share dishes, drinking glasses, cups, eating utensils, towels, bedding, or other items with the patient. After the patient uses these items, you should wash them thoroughly (see below Wash laundry thoroughly).   Clean all high-touch surfaces, such as counters, tabletops, doorknobs, bathroom fixtures, toilets, phones, keyboards, tablets, and bedside tables, every day. Also, clean any surfaces that may have blood, stool, or body fluids on them.   Use a household cleaning spray or wipe, according to the label instructions. Labels contain instructions for safe and effective use of the cleaning product including precautions you should take when applying the product, such as wearing gloves and making sure you have good ventilation during use of the product.   Wash laundry thoroughly.  o Immediately remove and wash clothes or bedding that have blood, stool, or body fluids on them.  o Wear disposable gloves while handling soiled items and keep soiled items away from your body. Clean your hands (with soap and water or an alcohol-based hand ) immediately after removing your gloves.  o Read and follow directions on labels of laundry or clothing items and detergent. In general, using a normal laundry detergent according to washing machine instructions and dry thoroughly using the warmest temperatures recommended on the clothing label.   Place all used disposable gloves, facemasks, and other contaminated items in a  lined container before disposing of them with other household waste. Clean your hands (with soap and water or an alcohol-based hand ) immediately after handling these items. Soap and water should be used preferentially if hands are visibly dirty.   Discuss any additional questions with your state or local health department or healthcare provider. Check available hours when contacting your local health department.    For more information see CDC link below.      https://www.cdc.gov/coronavirus/2019-ncov/hcp/guidance-prevent-spread.html#precautions        Sources:  [facility], [place] [facility] and [facility]    If you were prescribed a narcotic or controlled medication, do not drive or operate heavy equipment or machinery while taking these medications.  You must understand that you've received an Urgent Care treatment only and that you may be released before all your medical problems are known or treated. You, the patient, will arrange for follow up care as instructed.  Follow up with your PCP or specialty clinic as directed within 2-5 days if not improved or as needed.  You can call [phone] to schedule an appointment with the appropriate provider.  If your condition worsens we recommend that you receive another evaluation at the emergency room immediately or contact your primary medical clinics after hours call service to discuss your concerns.  Please return here or go to the Emergency Department for any concerns or worsening of condition.

## 2020-07-29 ENCOUNTER — PATIENT OUTREACH (OUTPATIENT)
Dept: ADMINISTRATIVE | Facility: OTHER | Age: 65
End: 2020-07-29

## 2020-12-02 ENCOUNTER — PATIENT MESSAGE (OUTPATIENT)
Dept: INTERNAL MEDICINE | Facility: CLINIC | Age: 65
End: 2020-12-02

## 2020-12-02 DIAGNOSIS — Z12.31 ENCOUNTER FOR SCREENING MAMMOGRAM FOR BREAST CANCER: Primary | ICD-10-CM

## 2020-12-29 ENCOUNTER — HOSPITAL ENCOUNTER (OUTPATIENT)
Dept: RADIOLOGY | Facility: HOSPITAL | Age: 65
Discharge: HOME | End: 2020-12-29
Attending: INTERNAL MEDICINE
Payer: COMMERCIAL

## 2020-12-29 DIAGNOSIS — Z12.31 ENCOUNTER FOR SCREENING MAMMOGRAM FOR BREAST CANCER: ICD-10-CM

## 2020-12-29 PROCEDURE — 77063 BREAST TOMOSYNTHESIS BI: CPT | Mod: 26,,, | Performed by: RADIOLOGY

## 2020-12-29 PROCEDURE — 77067 SCR MAMMO BI INCL CAD: CPT | Mod: 26,,, | Performed by: RADIOLOGY

## 2020-12-29 PROCEDURE — 77063 MAMMO DIGITAL SCREENING BILAT WITH TOMO: ICD-10-PCS | Mod: 26,,, | Performed by: RADIOLOGY

## 2020-12-29 PROCEDURE — 77067 SCR MAMMO BI INCL CAD: CPT | Mod: TC,PO

## 2020-12-29 PROCEDURE — 77067 MAMMO DIGITAL SCREENING BILAT WITH TOMO: ICD-10-PCS | Mod: 26,,, | Performed by: RADIOLOGY

## 2020-12-30 ENCOUNTER — TELEPHONE (OUTPATIENT)
Dept: RADIOLOGY | Facility: HOSPITAL | Age: 65
End: 2020-12-30

## 2020-12-30 NOTE — TELEPHONE ENCOUNTER
Called patient in reference to her breast imaging and scheduling a abnormal mammogram follow up. Left the patient a message with my direct phone number with a female that answered the phone.

## 2020-12-30 NOTE — TELEPHONE ENCOUNTER
Spoke with patient and explained mammogram findings.Patient expressed understanding of results. Patient scheduled abnormal mammogram follow up appointment at [facility] on 1/5/2021.

## 2021-01-06 ENCOUNTER — HOSPITAL ENCOUNTER (OUTPATIENT)
Dept: RADIOLOGY | Facility: HOSPITAL | Age: 66
Discharge: HOME | End: 2021-01-06
Attending: INTERNAL MEDICINE
Payer: COMMERCIAL

## 2021-01-06 DIAGNOSIS — R92.8 ABNORMAL MAMMOGRAM: ICD-10-CM

## 2021-01-06 PROCEDURE — 77061 MAMMO DIGITAL DIAGNOSTIC RIGHT WITH TOMO: ICD-10-PCS | Mod: 26,RT,, | Performed by: RADIOLOGY

## 2021-01-06 PROCEDURE — 76642 ULTRASOUND BREAST LIMITED: CPT | Mod: 26,RT,, | Performed by: RADIOLOGY

## 2021-01-06 PROCEDURE — 76642 US BREAST RIGHT LIMITED: ICD-10-PCS | Mod: 26,RT,, | Performed by: RADIOLOGY

## 2021-01-06 PROCEDURE — 77065 MAMMO DIGITAL DIAGNOSTIC RIGHT WITH TOMO: ICD-10-PCS | Mod: 26,RT,, | Performed by: RADIOLOGY

## 2021-01-06 PROCEDURE — 77061 BREAST TOMOSYNTHESIS UNI: CPT | Mod: TC,RT

## 2021-01-06 PROCEDURE — 77061 BREAST TOMOSYNTHESIS UNI: CPT | Mod: 26,RT,, | Performed by: RADIOLOGY

## 2021-01-06 PROCEDURE — 77065 DX MAMMO INCL CAD UNI: CPT | Mod: 26,RT,, | Performed by: RADIOLOGY

## 2021-01-06 PROCEDURE — 76642 ULTRASOUND BREAST LIMITED: CPT | Mod: TC,RT

## 2021-04-16 ENCOUNTER — PATIENT MESSAGE (OUTPATIENT)
Dept: RESEARCH | Facility: HOSPITAL | Age: 66
End: 2021-04-16

## 2021-04-30 ENCOUNTER — PATIENT OUTREACH (OUTPATIENT)
Dept: ADMINISTRATIVE | Facility: OTHER | Age: 66
End: 2021-04-30

## 2021-05-04 ENCOUNTER — OFFICE VISIT (OUTPATIENT)
Dept: GASTROENTEROLOGY | Facility: CLINIC | Age: 66
End: 2021-05-04
Payer: COMMERCIAL

## 2021-05-04 ENCOUNTER — LAB VISIT (OUTPATIENT)
Dept: LAB | Facility: HOSPITAL | Age: 66
End: 2021-05-04
Attending: STUDENT IN AN ORGANIZED HEALTH CARE EDUCATION/TRAINING PROGRAM
Payer: COMMERCIAL

## 2021-05-04 VITALS
HEIGHT: 65 IN | DIASTOLIC BLOOD PRESSURE: 76 MMHG | SYSTOLIC BLOOD PRESSURE: 120 MMHG | WEIGHT: 175.06 LBS | BODY MASS INDEX: 29.17 KG/M2

## 2021-05-04 DIAGNOSIS — K59.00 CONSTIPATION, UNSPECIFIED CONSTIPATION TYPE: Primary | ICD-10-CM

## 2021-05-04 DIAGNOSIS — K59.00 CONSTIPATION, UNSPECIFIED CONSTIPATION TYPE: ICD-10-CM

## 2021-05-04 DIAGNOSIS — R10.9 ABDOMINAL CRAMPING: ICD-10-CM

## 2021-05-04 DIAGNOSIS — R14.0 BLOATING: ICD-10-CM

## 2021-05-04 LAB
BASOPHILS # BLD AUTO: 0.02 K/UL (ref 0–0.2)
BASOPHILS NFR BLD: 0.4 % (ref 0–1.9)
DIFFERENTIAL METHOD: ABNORMAL
EOSINOPHIL # BLD AUTO: 0.1 K/UL (ref 0–0.5)
EOSINOPHIL NFR BLD: 1.1 % (ref 0–8)
ERYTHROCYTE [DISTWIDTH] IN BLOOD BY AUTOMATED COUNT: 13.2 % (ref 11.5–14.5)
HCT VFR BLD AUTO: 37.4 % (ref 37–48.5)
HGB BLD-MCNC: 12 G/DL (ref 12–16)
IMM GRANULOCYTES # BLD AUTO: 0.01 K/UL (ref 0–0.04)
IMM GRANULOCYTES NFR BLD AUTO: 0.2 % (ref 0–0.5)
LYMPHOCYTES # BLD AUTO: 1.5 K/UL (ref 1–4.8)
LYMPHOCYTES NFR BLD: 33.6 % (ref 18–48)
MCH RBC QN AUTO: 31.4 PG (ref 27–31)
MCHC RBC AUTO-ENTMCNC: 32.1 G/DL (ref 32–36)
MCV RBC AUTO: 98 FL (ref 82–98)
MONOCYTES # BLD AUTO: 0.5 K/UL (ref 0.3–1)
MONOCYTES NFR BLD: 10.6 % (ref 4–15)
NEUTROPHILS # BLD AUTO: 2.5 K/UL (ref 1.8–7.7)
NEUTROPHILS NFR BLD: 54.1 % (ref 38–73)
NRBC BLD-RTO: 0 /100 WBC
PLATELET # BLD AUTO: 232 K/UL (ref 150–450)
PMV BLD AUTO: 12.5 FL (ref 9.2–12.9)
RBC # BLD AUTO: 3.82 M/UL (ref 4–5.4)
WBC # BLD AUTO: 4.53 K/UL (ref 3.9–12.7)

## 2021-05-04 PROCEDURE — 3288F PR FALLS RISK ASSESSMENT DOCUMENTED: ICD-10-PCS | Mod: CPTII,S$GLB,, | Performed by: STUDENT IN AN ORGANIZED HEALTH CARE EDUCATION/TRAINING PROGRAM

## 2021-05-04 PROCEDURE — 99204 PR OFFICE/OUTPT VISIT, NEW, LEVL IV, 45-59 MIN: ICD-10-PCS | Mod: S$GLB,,, | Performed by: STUDENT IN AN ORGANIZED HEALTH CARE EDUCATION/TRAINING PROGRAM

## 2021-05-04 PROCEDURE — 84443 ASSAY THYROID STIM HORMONE: CPT | Performed by: STUDENT IN AN ORGANIZED HEALTH CARE EDUCATION/TRAINING PROGRAM

## 2021-05-04 PROCEDURE — 99999 PR PBB SHADOW E&M-EST. PATIENT-LVL III: CPT | Mod: PBBFAC,,, | Performed by: STUDENT IN AN ORGANIZED HEALTH CARE EDUCATION/TRAINING PROGRAM

## 2021-05-04 PROCEDURE — 3288F FALL RISK ASSESSMENT DOCD: CPT | Mod: CPTII,S$GLB,, | Performed by: STUDENT IN AN ORGANIZED HEALTH CARE EDUCATION/TRAINING PROGRAM

## 2021-05-04 PROCEDURE — 85025 COMPLETE CBC W/AUTO DIFF WBC: CPT | Performed by: STUDENT IN AN ORGANIZED HEALTH CARE EDUCATION/TRAINING PROGRAM

## 2021-05-04 PROCEDURE — 1101F PT FALLS ASSESS-DOCD LE1/YR: CPT | Mod: CPTII,S$GLB,, | Performed by: STUDENT IN AN ORGANIZED HEALTH CARE EDUCATION/TRAINING PROGRAM

## 2021-05-04 PROCEDURE — 84436 ASSAY OF TOTAL THYROXINE: CPT | Performed by: STUDENT IN AN ORGANIZED HEALTH CARE EDUCATION/TRAINING PROGRAM

## 2021-05-04 PROCEDURE — 1126F PR PAIN SEVERITY QUANTIFIED, NO PAIN PRESENT: ICD-10-PCS | Mod: S$GLB,,, | Performed by: STUDENT IN AN ORGANIZED HEALTH CARE EDUCATION/TRAINING PROGRAM

## 2021-05-04 PROCEDURE — 36415 COLL VENOUS BLD VENIPUNCTURE: CPT | Mod: PO | Performed by: STUDENT IN AN ORGANIZED HEALTH CARE EDUCATION/TRAINING PROGRAM

## 2021-05-04 PROCEDURE — 1157F ADVNC CARE PLAN IN RCRD: CPT | Mod: S$GLB,,, | Performed by: STUDENT IN AN ORGANIZED HEALTH CARE EDUCATION/TRAINING PROGRAM

## 2021-05-04 PROCEDURE — 3008F BODY MASS INDEX DOCD: CPT | Mod: CPTII,S$GLB,, | Performed by: STUDENT IN AN ORGANIZED HEALTH CARE EDUCATION/TRAINING PROGRAM

## 2021-05-04 PROCEDURE — 80053 COMPREHEN METABOLIC PANEL: CPT | Performed by: STUDENT IN AN ORGANIZED HEALTH CARE EDUCATION/TRAINING PROGRAM

## 2021-05-04 PROCEDURE — 86677 HELICOBACTER PYLORI ANTIBODY: CPT | Performed by: STUDENT IN AN ORGANIZED HEALTH CARE EDUCATION/TRAINING PROGRAM

## 2021-05-04 PROCEDURE — 1157F PR ADVANCE CARE PLAN OR EQUIV PRESENT IN MEDICAL RECORD: ICD-10-PCS | Mod: S$GLB,,, | Performed by: STUDENT IN AN ORGANIZED HEALTH CARE EDUCATION/TRAINING PROGRAM

## 2021-05-04 PROCEDURE — 99999 PR PBB SHADOW E&M-EST. PATIENT-LVL III: ICD-10-PCS | Mod: PBBFAC,,, | Performed by: STUDENT IN AN ORGANIZED HEALTH CARE EDUCATION/TRAINING PROGRAM

## 2021-05-04 PROCEDURE — 3008F PR BODY MASS INDEX (BMI) DOCUMENTED: ICD-10-PCS | Mod: CPTII,S$GLB,, | Performed by: STUDENT IN AN ORGANIZED HEALTH CARE EDUCATION/TRAINING PROGRAM

## 2021-05-04 PROCEDURE — 99204 OFFICE O/P NEW MOD 45 MIN: CPT | Mod: S$GLB,,, | Performed by: STUDENT IN AN ORGANIZED HEALTH CARE EDUCATION/TRAINING PROGRAM

## 2021-05-04 PROCEDURE — 1126F AMNT PAIN NOTED NONE PRSNT: CPT | Mod: S$GLB,,, | Performed by: STUDENT IN AN ORGANIZED HEALTH CARE EDUCATION/TRAINING PROGRAM

## 2021-05-04 PROCEDURE — 1101F PR PT FALLS ASSESS DOC 0-1 FALLS W/OUT INJ PAST YR: ICD-10-PCS | Mod: CPTII,S$GLB,, | Performed by: STUDENT IN AN ORGANIZED HEALTH CARE EDUCATION/TRAINING PROGRAM

## 2021-05-04 RX ORDER — LUBIPROSTONE 24 UG/1
24 CAPSULE ORAL 2 TIMES DAILY WITH MEALS
Qty: 60 CAPSULE | Refills: 1 | Status: SHIPPED | OUTPATIENT
Start: 2021-05-04 | End: 2021-05-11

## 2021-05-05 LAB
ALBUMIN SERPL BCP-MCNC: 3.8 G/DL (ref 3.5–5.2)
ALP SERPL-CCNC: 52 U/L (ref 55–135)
ALT SERPL W/O P-5'-P-CCNC: 13 U/L (ref 10–44)
ANION GAP SERPL CALC-SCNC: 8 MMOL/L (ref 8–16)
AST SERPL-CCNC: 23 U/L (ref 10–40)
BILIRUB SERPL-MCNC: 0.4 MG/DL (ref 0.1–1)
BUN SERPL-MCNC: 19 MG/DL (ref 8–23)
CALCIUM SERPL-MCNC: 9.3 MG/DL (ref 8.7–10.5)
CHLORIDE SERPL-SCNC: 106 MMOL/L (ref 95–110)
CO2 SERPL-SCNC: 26 MMOL/L (ref 23–29)
CREAT SERPL-MCNC: 0.9 MG/DL (ref 0.5–1.4)
EST. GFR  (AFRICAN AMERICAN): >60 ML/MIN/1.73 M^2
EST. GFR  (NON AFRICAN AMERICAN): >60 ML/MIN/1.73 M^2
GLUCOSE SERPL-MCNC: 91 MG/DL (ref 70–110)
H PYLORI IGG SERPL QL IA: NEGATIVE
POTASSIUM SERPL-SCNC: 3.8 MMOL/L (ref 3.5–5.1)
PROT SERPL-MCNC: 7 G/DL (ref 6–8.4)
SODIUM SERPL-SCNC: 140 MMOL/L (ref 136–145)
T4 SERPL-MCNC: 6.7 UG/DL (ref 4.5–11.5)
TSH SERPL DL<=0.005 MIU/L-ACNC: 0.9 UIU/ML (ref 0.4–4)

## 2021-05-11 ENCOUNTER — OFFICE VISIT (OUTPATIENT)
Dept: CARDIOLOGY | Facility: CLINIC | Age: 66
End: 2021-05-11
Payer: COMMERCIAL

## 2021-05-11 VITALS
HEIGHT: 65 IN | HEART RATE: 73 BPM | SYSTOLIC BLOOD PRESSURE: 137 MMHG | WEIGHT: 179.88 LBS | DIASTOLIC BLOOD PRESSURE: 70 MMHG | BODY MASS INDEX: 29.97 KG/M2

## 2021-05-11 DIAGNOSIS — R03.0 ELEVATED BLOOD-PRESSURE READING, WITHOUT DIAGNOSIS OF HYPERTENSION: ICD-10-CM

## 2021-05-11 DIAGNOSIS — I20.0 UNSTABLE ANGINA PECTORIS: Primary | ICD-10-CM

## 2021-05-11 DIAGNOSIS — R94.31 NONSPECIFIC ABNORMAL ELECTROCARDIOGRAM (ECG) (EKG): ICD-10-CM

## 2021-05-11 PROBLEM — I20.9 ANGINA PECTORIS: Status: ACTIVE | Noted: 2021-05-11

## 2021-05-11 PROCEDURE — 99999 PR PBB SHADOW E&M-EST. PATIENT-LVL III: CPT | Mod: PBBFAC,,, | Performed by: INTERNAL MEDICINE

## 2021-05-11 PROCEDURE — 1157F PR ADVANCE CARE PLAN OR EQUIV PRESENT IN MEDICAL RECORD: ICD-10-PCS | Mod: S$GLB,,, | Performed by: INTERNAL MEDICINE

## 2021-05-11 PROCEDURE — 99214 PR OFFICE/OUTPT VISIT, EST, LEVL IV, 30-39 MIN: ICD-10-PCS | Mod: S$GLB,,, | Performed by: INTERNAL MEDICINE

## 2021-05-11 PROCEDURE — 99999 PR PBB SHADOW E&M-EST. PATIENT-LVL III: ICD-10-PCS | Mod: PBBFAC,,, | Performed by: INTERNAL MEDICINE

## 2021-05-11 PROCEDURE — 3008F PR BODY MASS INDEX (BMI) DOCUMENTED: ICD-10-PCS | Mod: CPTII,S$GLB,, | Performed by: INTERNAL MEDICINE

## 2021-05-11 PROCEDURE — 3008F BODY MASS INDEX DOCD: CPT | Mod: CPTII,S$GLB,, | Performed by: INTERNAL MEDICINE

## 2021-05-11 PROCEDURE — 93000 ELECTROCARDIOGRAM COMPLETE: CPT | Mod: S$GLB,,, | Performed by: INTERNAL MEDICINE

## 2021-05-11 PROCEDURE — 1126F PR PAIN SEVERITY QUANTIFIED, NO PAIN PRESENT: ICD-10-PCS | Mod: S$GLB,,, | Performed by: INTERNAL MEDICINE

## 2021-05-11 PROCEDURE — 93000 EKG 12-LEAD: ICD-10-PCS | Mod: S$GLB,,, | Performed by: INTERNAL MEDICINE

## 2021-05-11 PROCEDURE — 1157F ADVNC CARE PLAN IN RCRD: CPT | Mod: S$GLB,,, | Performed by: INTERNAL MEDICINE

## 2021-05-11 PROCEDURE — 99214 OFFICE O/P EST MOD 30 MIN: CPT | Mod: S$GLB,,, | Performed by: INTERNAL MEDICINE

## 2021-05-11 PROCEDURE — 1126F AMNT PAIN NOTED NONE PRSNT: CPT | Mod: S$GLB,,, | Performed by: INTERNAL MEDICINE

## 2021-05-11 RX ORDER — METOPROLOL SUCCINATE 25 MG/1
25 TABLET, EXTENDED RELEASE ORAL DAILY
Qty: 30 TABLET | Refills: 11 | Status: SHIPPED | OUTPATIENT
Start: 2021-05-11 | End: 2021-05-11

## 2021-05-11 RX ORDER — METOPROLOL SUCCINATE 25 MG/1
25 TABLET, EXTENDED RELEASE ORAL NIGHTLY
Qty: 30 TABLET | Refills: 11 | Status: SHIPPED | OUTPATIENT
Start: 2021-05-11 | End: 2022-07-21

## 2021-05-11 RX ORDER — AMLODIPINE BESYLATE 2.5 MG/1
2.5 TABLET ORAL NIGHTLY
Qty: 30 TABLET | Refills: 11 | Status: SHIPPED | OUTPATIENT
Start: 2021-05-11 | End: 2021-07-08

## 2021-05-11 RX ORDER — AMLODIPINE BESYLATE 2.5 MG/1
2.5 TABLET ORAL DAILY
Qty: 30 TABLET | Refills: 11 | Status: SHIPPED | OUTPATIENT
Start: 2021-05-11 | End: 2021-05-11

## 2021-05-13 ENCOUNTER — TELEPHONE (OUTPATIENT)
Dept: CARDIOLOGY | Facility: CLINIC | Age: 66
End: 2021-05-13

## 2021-05-26 ENCOUNTER — TELEPHONE (OUTPATIENT)
Dept: GASTROENTEROLOGY | Facility: CLINIC | Age: 66
End: 2021-05-26

## 2021-07-06 ENCOUNTER — PATIENT MESSAGE (OUTPATIENT)
Dept: ADMINISTRATIVE | Facility: HOSPITAL | Age: 66
End: 2021-07-06

## 2021-07-07 ENCOUNTER — TELEPHONE (OUTPATIENT)
Dept: INTERNAL MEDICINE | Facility: CLINIC | Age: 66
End: 2021-07-07

## 2021-07-08 ENCOUNTER — OFFICE VISIT (OUTPATIENT)
Dept: INTERNAL MEDICINE | Facility: CLINIC | Age: 66
End: 2021-07-08
Payer: COMMERCIAL

## 2021-07-08 ENCOUNTER — LAB VISIT (OUTPATIENT)
Dept: LAB | Facility: HOSPITAL | Age: 66
End: 2021-07-08
Attending: INTERNAL MEDICINE
Payer: COMMERCIAL

## 2021-07-08 VITALS
RESPIRATION RATE: 16 BRPM | OXYGEN SATURATION: 99 % | BODY MASS INDEX: 28.98 KG/M2 | WEIGHT: 173.94 LBS | TEMPERATURE: 97 F | DIASTOLIC BLOOD PRESSURE: 80 MMHG | HEIGHT: 65 IN | HEART RATE: 73 BPM | SYSTOLIC BLOOD PRESSURE: 120 MMHG

## 2021-07-08 DIAGNOSIS — Z00.00 ANNUAL PHYSICAL EXAM: Primary | ICD-10-CM

## 2021-07-08 DIAGNOSIS — Z00.00 ANNUAL PHYSICAL EXAM: ICD-10-CM

## 2021-07-08 PROCEDURE — 3288F PR FALLS RISK ASSESSMENT DOCUMENTED: ICD-10-PCS | Mod: CPTII,S$GLB,, | Performed by: INTERNAL MEDICINE

## 2021-07-08 PROCEDURE — 99397 PR PREVENTIVE VISIT,EST,65 & OVER: ICD-10-PCS | Mod: 25,S$GLB,, | Performed by: INTERNAL MEDICINE

## 2021-07-08 PROCEDURE — 1126F PR PAIN SEVERITY QUANTIFIED, NO PAIN PRESENT: ICD-10-PCS | Mod: S$GLB,,, | Performed by: INTERNAL MEDICINE

## 2021-07-08 PROCEDURE — 1157F ADVNC CARE PLAN IN RCRD: CPT | Mod: S$GLB,,, | Performed by: INTERNAL MEDICINE

## 2021-07-08 PROCEDURE — 1157F PR ADVANCE CARE PLAN OR EQUIV PRESENT IN MEDICAL RECORD: ICD-10-PCS | Mod: S$GLB,,, | Performed by: INTERNAL MEDICINE

## 2021-07-08 PROCEDURE — 99999 PR PBB SHADOW E&M-EST. PATIENT-LVL IV: ICD-10-PCS | Mod: PBBFAC,,, | Performed by: INTERNAL MEDICINE

## 2021-07-08 PROCEDURE — 1126F AMNT PAIN NOTED NONE PRSNT: CPT | Mod: S$GLB,,, | Performed by: INTERNAL MEDICINE

## 2021-07-08 PROCEDURE — 99999 PR PBB SHADOW E&M-EST. PATIENT-LVL IV: CPT | Mod: PBBFAC,,, | Performed by: INTERNAL MEDICINE

## 2021-07-08 PROCEDURE — 36415 COLL VENOUS BLD VENIPUNCTURE: CPT | Mod: PO | Performed by: INTERNAL MEDICINE

## 2021-07-08 PROCEDURE — 84132 ASSAY OF SERUM POTASSIUM: CPT | Performed by: INTERNAL MEDICINE

## 2021-07-08 PROCEDURE — 90732 PNEUMOCOCCAL POLYSACCHARIDE VACCINE 23-VALENT =>2YO SQ IM: ICD-10-PCS | Mod: S$GLB,,, | Performed by: INTERNAL MEDICINE

## 2021-07-08 PROCEDURE — 1101F PT FALLS ASSESS-DOCD LE1/YR: CPT | Mod: CPTII,S$GLB,, | Performed by: INTERNAL MEDICINE

## 2021-07-08 PROCEDURE — 99397 PER PM REEVAL EST PAT 65+ YR: CPT | Mod: 25,S$GLB,, | Performed by: INTERNAL MEDICINE

## 2021-07-08 PROCEDURE — 1101F PR PT FALLS ASSESS DOC 0-1 FALLS W/OUT INJ PAST YR: ICD-10-PCS | Mod: CPTII,S$GLB,, | Performed by: INTERNAL MEDICINE

## 2021-07-08 PROCEDURE — 90732 PPSV23 VACC 2 YRS+ SUBQ/IM: CPT | Mod: S$GLB,,, | Performed by: INTERNAL MEDICINE

## 2021-07-08 PROCEDURE — 3008F PR BODY MASS INDEX (BMI) DOCUMENTED: ICD-10-PCS | Mod: CPTII,S$GLB,, | Performed by: INTERNAL MEDICINE

## 2021-07-08 PROCEDURE — 90471 PNEUMOCOCCAL POLYSACCHARIDE VACCINE 23-VALENT =>2YO SQ IM: ICD-10-PCS | Mod: S$GLB,,, | Performed by: INTERNAL MEDICINE

## 2021-07-08 PROCEDURE — 3288F FALL RISK ASSESSMENT DOCD: CPT | Mod: CPTII,S$GLB,, | Performed by: INTERNAL MEDICINE

## 2021-07-08 PROCEDURE — 80061 LIPID PANEL: CPT | Performed by: INTERNAL MEDICINE

## 2021-07-08 PROCEDURE — 3008F BODY MASS INDEX DOCD: CPT | Mod: CPTII,S$GLB,, | Performed by: INTERNAL MEDICINE

## 2021-07-08 PROCEDURE — 90471 IMMUNIZATION ADMIN: CPT | Mod: S$GLB,,, | Performed by: INTERNAL MEDICINE

## 2021-07-08 RX ORDER — LUBIPROSTONE 24 UG/1
24 CAPSULE ORAL 2 TIMES DAILY WITH MEALS
COMMUNITY
Start: 2021-06-07 | End: 2022-07-21

## 2021-07-09 ENCOUNTER — TELEPHONE (OUTPATIENT)
Dept: INTERNAL MEDICINE | Facility: CLINIC | Age: 66
End: 2021-07-09

## 2021-07-09 LAB
CHOLEST SERPL-MCNC: 182 MG/DL (ref 120–199)
CHOLEST/HDLC SERPL: 3.3 {RATIO} (ref 2–5)
HDLC SERPL-MCNC: 55 MG/DL (ref 40–75)
HDLC SERPL: 30.2 % (ref 20–50)
LDLC SERPL CALC-MCNC: 111.8 MG/DL (ref 63–159)
NONHDLC SERPL-MCNC: 127 MG/DL
POTASSIUM SERPL-SCNC: 4.6 MMOL/L (ref 3.5–5.1)
TRIGL SERPL-MCNC: 76 MG/DL (ref 30–150)

## 2021-10-27 ENCOUNTER — PATIENT MESSAGE (OUTPATIENT)
Dept: INTERNAL MEDICINE | Facility: CLINIC | Age: 66
End: 2021-10-27
Payer: COMMERCIAL

## 2021-10-27 DIAGNOSIS — R41.3 MEMORY LOSS: Primary | ICD-10-CM

## 2021-12-28 ENCOUNTER — TELEPHONE (OUTPATIENT)
Dept: NEUROLOGY | Facility: CLINIC | Age: 66
End: 2021-12-28
Payer: COMMERCIAL

## 2022-01-11 ENCOUNTER — TELEPHONE (OUTPATIENT)
Dept: NEUROLOGY | Facility: CLINIC | Age: 67
End: 2022-01-11
Payer: COMMERCIAL

## 2022-01-11 NOTE — TELEPHONE ENCOUNTER
----- Message from Valentin Wheeler sent at 1/11/2022  8:52 AM CST -----  Contact: Patient  Patient missed call. Requesting call back    Patient @[phone]

## 2022-01-11 NOTE — TELEPHONE ENCOUNTER
Spoke to patient in regards of rescheduling appointment. Patient has been rescheduled and added to waitlist.

## 2022-05-27 ENCOUNTER — OFFICE VISIT (OUTPATIENT)
Dept: NEUROLOGY | Facility: CLINIC | Age: 67
End: 2022-05-27
Payer: COMMERCIAL

## 2022-05-27 VITALS
HEIGHT: 66 IN | WEIGHT: 176.38 LBS | SYSTOLIC BLOOD PRESSURE: 148 MMHG | HEART RATE: 76 BPM | BODY MASS INDEX: 28.34 KG/M2 | DIASTOLIC BLOOD PRESSURE: 82 MMHG

## 2022-05-27 DIAGNOSIS — R25.1 TREMOR: ICD-10-CM

## 2022-05-27 DIAGNOSIS — R41.3 MEMORY CHANGES: Primary | ICD-10-CM

## 2022-05-27 PROCEDURE — 1160F PR REVIEW ALL MEDS BY PRESCRIBER/CLIN PHARMACIST DOCUMENTED: ICD-10-PCS | Mod: CPTII,S$GLB,, | Performed by: PSYCHIATRY & NEUROLOGY

## 2022-05-27 PROCEDURE — 99204 PR OFFICE/OUTPT VISIT, NEW, LEVL IV, 45-59 MIN: ICD-10-PCS | Mod: S$GLB,,, | Performed by: PSYCHIATRY & NEUROLOGY

## 2022-05-27 PROCEDURE — 1157F PR ADVANCE CARE PLAN OR EQUIV PRESENT IN MEDICAL RECORD: ICD-10-PCS | Mod: CPTII,S$GLB,, | Performed by: PSYCHIATRY & NEUROLOGY

## 2022-05-27 PROCEDURE — 1101F PT FALLS ASSESS-DOCD LE1/YR: CPT | Mod: CPTII,S$GLB,, | Performed by: PSYCHIATRY & NEUROLOGY

## 2022-05-27 PROCEDURE — 3079F PR MOST RECENT DIASTOLIC BLOOD PRESSURE 80-89 MM HG: ICD-10-PCS | Mod: CPTII,S$GLB,, | Performed by: PSYCHIATRY & NEUROLOGY

## 2022-05-27 PROCEDURE — 3077F PR MOST RECENT SYSTOLIC BLOOD PRESSURE >= 140 MM HG: ICD-10-PCS | Mod: CPTII,S$GLB,, | Performed by: PSYCHIATRY & NEUROLOGY

## 2022-05-27 PROCEDURE — 1157F ADVNC CARE PLAN IN RCRD: CPT | Mod: CPTII,S$GLB,, | Performed by: PSYCHIATRY & NEUROLOGY

## 2022-05-27 PROCEDURE — 1126F AMNT PAIN NOTED NONE PRSNT: CPT | Mod: CPTII,S$GLB,, | Performed by: PSYCHIATRY & NEUROLOGY

## 2022-05-27 PROCEDURE — 3077F SYST BP >= 140 MM HG: CPT | Mod: CPTII,S$GLB,, | Performed by: PSYCHIATRY & NEUROLOGY

## 2022-05-27 PROCEDURE — 3288F PR FALLS RISK ASSESSMENT DOCUMENTED: ICD-10-PCS | Mod: CPTII,S$GLB,, | Performed by: PSYCHIATRY & NEUROLOGY

## 2022-05-27 PROCEDURE — 3288F FALL RISK ASSESSMENT DOCD: CPT | Mod: CPTII,S$GLB,, | Performed by: PSYCHIATRY & NEUROLOGY

## 2022-05-27 PROCEDURE — 99204 OFFICE O/P NEW MOD 45 MIN: CPT | Mod: S$GLB,,, | Performed by: PSYCHIATRY & NEUROLOGY

## 2022-05-27 PROCEDURE — 3008F PR BODY MASS INDEX (BMI) DOCUMENTED: ICD-10-PCS | Mod: CPTII,S$GLB,, | Performed by: PSYCHIATRY & NEUROLOGY

## 2022-05-27 PROCEDURE — 3079F DIAST BP 80-89 MM HG: CPT | Mod: CPTII,S$GLB,, | Performed by: PSYCHIATRY & NEUROLOGY

## 2022-05-27 PROCEDURE — 99999 PR PBB SHADOW E&M-EST. PATIENT-LVL IV: ICD-10-PCS | Mod: PBBFAC,,, | Performed by: PSYCHIATRY & NEUROLOGY

## 2022-05-27 PROCEDURE — 1160F RVW MEDS BY RX/DR IN RCRD: CPT | Mod: CPTII,S$GLB,, | Performed by: PSYCHIATRY & NEUROLOGY

## 2022-05-27 PROCEDURE — 1159F PR MEDICATION LIST DOCUMENTED IN MEDICAL RECORD: ICD-10-PCS | Mod: CPTII,S$GLB,, | Performed by: PSYCHIATRY & NEUROLOGY

## 2022-05-27 PROCEDURE — 1126F PR PAIN SEVERITY QUANTIFIED, NO PAIN PRESENT: ICD-10-PCS | Mod: CPTII,S$GLB,, | Performed by: PSYCHIATRY & NEUROLOGY

## 2022-05-27 PROCEDURE — 1159F MED LIST DOCD IN RCRD: CPT | Mod: CPTII,S$GLB,, | Performed by: PSYCHIATRY & NEUROLOGY

## 2022-05-27 PROCEDURE — 3008F BODY MASS INDEX DOCD: CPT | Mod: CPTII,S$GLB,, | Performed by: PSYCHIATRY & NEUROLOGY

## 2022-05-27 PROCEDURE — 99999 PR PBB SHADOW E&M-EST. PATIENT-LVL IV: CPT | Mod: PBBFAC,,, | Performed by: PSYCHIATRY & NEUROLOGY

## 2022-05-27 PROCEDURE — 1101F PR PT FALLS ASSESS DOC 0-1 FALLS W/OUT INJ PAST YR: ICD-10-PCS | Mod: CPTII,S$GLB,, | Performed by: PSYCHIATRY & NEUROLOGY

## 2022-05-27 NOTE — PROGRESS NOTES
[facility] - NEUROLOGY [address]TH [address] [facility], [place]    Date: 5/27/22  Patient Name: Brittany Modi   MRN: [id number]   PCP: Mehdi Tian  Referring Provider: Self, Aaareferral    Chief Complaint:  Changes in memory  Subjective:      HPI:   Ms. Brittany Modi is a 66 y.o. female presenting for evaluation of changes in memory.  The patient feels if she has noticed a decline in memory over the last couple of years.  She is currently living alone.  Completes all activities of daily living without issue including cooking, cleaning, driving, finances.  No driving or household accidents of concern.  Denies significant neurological history including stroke.  Does mention a history of cervical dystonia in the past that was not treated.  Family history significant for mother with some memory disturbance but is also notable that her mother is 99. Friends and family have not mention concerns over the patient's memory.  Denies mood disturbances like depression but does have some low-level anxiety.  Denies significant sleep disturbances but daughter has noted her talking in her sleep a few times.  Appetite intact.    She would also like to discuss tremor in the left hand.  Tremor began 4 weeks ago and is most prominent when she is using the limb.  She provides example of holding steering wheel while driving.  Examiner notes that tremor does not seem to present when the arms at rest and patient is distracted.    PAST MEDICAL HISTORY:  Past Medical History:   Diagnosis Date    Arthritis     Chest pain     GERD (gastroesophageal reflux disease)        PAST SURGICAL HISTORY:  Past Surgical History:   Procedure Laterality Date    abdominal surgery      abdominoplasty    APPENDECTOMY      2010    COLONOSCOPY      COLONOSCOPY N/A 8/22/2019    Procedure: COLONOSCOPY;  Surgeon: Rose Mary Ervin MD;  Location: [address];  Service: Endoscopy;  Laterality: N/A;  no PM prep     CYSTOSCOPY N/A 12/5/2019    Procedure: CYSTOSCOPY;  Surgeon: Geri Azar MD;  Location: [facility], [place], operated by [facility] OR;  Service: OB/GYN;  Laterality: N/A;    HYSTERECTOMY      JOINT REPLACEMENT      KNEE SURGERY Right 12-21-15    TKR    KNEE SURGERY Right 12/2015    TKR    LAPAROSCOPIC TOTAL HYSTERECTOMY N/A 12/5/2019    Procedure: HYSTERECTOMY, TOTAL, LAPAROSCOPIC;  Surgeon: Geri Azar MD;  Location: [facility], [place], operated by [facility] OR;  Service: OB/GYN;  Laterality: N/A;    TUBAL LIGATION      age 30's       CURRENT MEDS:  Current Outpatient Medications   Medication Sig Dispense Refill    cholecalciferol, vitamin D3, 125 mcg (5,000 unit) Tab Take 5,000 Units by mouth once daily.      fish oil-omega-3 fatty acids 300-1,000 mg capsule Take 2 g by mouth once daily.      multivitamin capsule Take 1 capsule by mouth once daily.      lubiprostone (AMITIZA) 24 MCG Cap Take 24 mcg by mouth 2 (two) times daily with meals.      metoprolol succinate (TOPROL-XL) 25 MG 24 hr tablet Take 1 tablet (25 mg total) by mouth every evening. 30 tablet 11     No current facility-administered medications for this visit.       ALLERGIES:  Review of patient's allergies indicates:  No Known Allergies    FAMILY HISTORY:  Family History   Problem Relation Age of Onset    Diabetes Mother     Hypertension Mother     Hypothyroidism Mother     Asthma Father     Hypertension Sister     Cancer Sister         breast    Breast cancer Sister     Hypertension Brother     Hypertension Sister     Hypertension Sister     Hypertension Brother     Hypertension Son     Colon cancer Neg Hx     Ovarian cancer Neg Hx        SOCIAL HISTORY:  Social History     Tobacco Use    Smoking status: Never Smoker    Smokeless tobacco: Never Used    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at [employer]   Substance Use Topics    Alcohol use: No    Drug use: No       Review of Systems:  Gen: no fever, no chills, no generalized feeling of weakness   HEENT: no double vision, no blurred vision, no eye  "pain, no eye exudates. no nasal congestion,   no traumatic injury of head, no neck pain, no neck stiffness. no photophobia or phonophobia at this time. ?    Heart: no chest pain, no SOB    Lungs: no SOB, no cough    MSK: no weakness of legs, intact ROM    ABD: no abd pain, no N/V/D/C, no difficulty with defecation.    Extremities: No leg pain, no edema.       Objective:     Vitals:    05/27/22 1204   BP: (!) 148/82   Pulse: 76   Weight: 80 kg (176 lb 5.9 oz)   Height: 5' 6" (1.676 m)     General:  Female in NAD, alert and awake, Aox3, well groomed. ?  No hypomimia  ? ?    HEENT: Head is NC/AT EOMI, pupil size: 4 mm B/L, no nystagmus noted; hearing grossly intact b/l. Mucous membrane moist, uvula midline, no pharyngeal erythema, exudates or discharges.      Neck: Supple. no nuchal rigidity.      Cardiovascular: well perfused, no cyanosis        Respiratory: Symmetric chest rise noted       Musculoskeletal:  No fasciculations noted during this encounter.     Extremities: No pedal edema or calf tenderness. No cogwheel rigidity noted on B/L UE extremities.    Patient does have a medium amplitude, medium frequency tremor in the left upper extremity when muscles are activated.  Tremor is not present at rest when arm is relaxed and patient is discussing other topics.  No rigidity but the patient had to be repeatedly asked to relax the arm to achieve exam     Neurological Examination.    Mental status: AA&O x3; Affect/mood is euthymic/congruent; no aphasia noted during examination. Patient answers simple questions appropriately & follows simple commands; no dysarthria or expressive aphasia; no cara-neglect or extinction. Vocabulary/word finding: excellent.       Cranial Nerves: II-XII grossly intact.      Muscle Function: Tone WNL and Muscle bulk WNL.  Full and symmetric strength throughout     Sensory: ?  Intact to light touch throughout      Reflexes:  2/4 throughout bilateral upper extremities, 1/4 throughout bilateral " lower extremities     Coordination: no dysmetria (finger to nose negative)     Gait: adequate casual gait with stride length and arm swing WNL.  Posture is mildly stooped    No hypophonia, global bradykinesia, global rigidity.  Rapid alternating movements mildly slowed on the left compared to right      05/27/22 MOCA: 23/30  Visuospatial/Executive 4, Naming 3, Attention 4, Language 3, Abstraction 2, Delayed Recall 2, Orientation 5    Assessment:   Brittany Modi is a 66 y.o. female presenting for evaluation of changes in memory and left upper extremity tremor.  After extensive discussion, we have decided to proceed conservatively with memory investigation by first having evaluation with neuropsychology.  We will make recommendations regarding further workup or monitoring plan based on their recommendations.  New onset tremor in the left upper extremity appears postural during today's encounter but the patient does have mild changes in rapid alternating movements in the limb and subtle changes in gait.  We will re-evaluate at our 4-6 week follow-up and consider immediate evaluation with movement Disorder Clinic if changes or any further progression should occur.    Plan:     Problem List Items Addressed This Visit        Neuro    Memory changes - Primary    Relevant Orders    Ambulatory referral/consult to Neuropsychology    Tremor        - encourage cognitively stimulating activities including regular socialization, reading, puzzles  - encourage regular physical activity for good vascular and brain health  - encouraged tight control blood pressure, glucose, cholesterol  - close monitoring of left upper extremity tremor for any signs of progression  - follow-up with our clinic in 4-6 weeks or after completing neuropsychology evaluation.    I spent a total of 45 minutes on the day of the visit. This includes face to face time and non-face to face time preparing to see the patient (eg, review of tests), obtaining  and/or reviewing separately obtained history, documenting clinical information in the electronic or other health record, independently interpreting results and communicating results to the patient/family/caregiver, or care coordinator.    A dictation device was used to produce this document. Use of such devices sometimes results in grammatical errors or replacement of words that sound similarly.    Trevor Arreola, DO

## 2022-06-15 DIAGNOSIS — I20.0 UNSTABLE ANGINA PECTORIS: ICD-10-CM

## 2022-06-15 RX ORDER — AMLODIPINE BESYLATE 2.5 MG/1
TABLET ORAL
Qty: 90 TABLET | OUTPATIENT
Start: 2022-06-15

## 2022-06-16 ENCOUNTER — OFFICE VISIT (OUTPATIENT)
Dept: NEUROLOGY | Facility: CLINIC | Age: 67
End: 2022-06-16
Payer: COMMERCIAL

## 2022-06-16 VITALS
SYSTOLIC BLOOD PRESSURE: 145 MMHG | DIASTOLIC BLOOD PRESSURE: 86 MMHG | HEIGHT: 65 IN | HEART RATE: 74 BPM | BODY MASS INDEX: 29.75 KG/M2 | WEIGHT: 178.56 LBS

## 2022-06-16 DIAGNOSIS — R41.3 MEMORY LOSS: ICD-10-CM

## 2022-06-16 PROCEDURE — 3077F SYST BP >= 140 MM HG: CPT | Mod: CPTII,S$GLB,, | Performed by: NEUROMUSCULOSKELETAL MEDICINE & OMM

## 2022-06-16 PROCEDURE — 99214 PR OFFICE/OUTPT VISIT, EST, LEVL IV, 30-39 MIN: ICD-10-PCS | Mod: S$GLB,,, | Performed by: NEUROMUSCULOSKELETAL MEDICINE & OMM

## 2022-06-16 PROCEDURE — 1157F ADVNC CARE PLAN IN RCRD: CPT | Mod: CPTII,S$GLB,, | Performed by: NEUROMUSCULOSKELETAL MEDICINE & OMM

## 2022-06-16 PROCEDURE — 3288F PR FALLS RISK ASSESSMENT DOCUMENTED: ICD-10-PCS | Mod: CPTII,S$GLB,, | Performed by: NEUROMUSCULOSKELETAL MEDICINE & OMM

## 2022-06-16 PROCEDURE — 3077F PR MOST RECENT SYSTOLIC BLOOD PRESSURE >= 140 MM HG: ICD-10-PCS | Mod: CPTII,S$GLB,, | Performed by: NEUROMUSCULOSKELETAL MEDICINE & OMM

## 2022-06-16 PROCEDURE — 3079F PR MOST RECENT DIASTOLIC BLOOD PRESSURE 80-89 MM HG: ICD-10-PCS | Mod: CPTII,S$GLB,, | Performed by: NEUROMUSCULOSKELETAL MEDICINE & OMM

## 2022-06-16 PROCEDURE — 1101F PT FALLS ASSESS-DOCD LE1/YR: CPT | Mod: CPTII,S$GLB,, | Performed by: NEUROMUSCULOSKELETAL MEDICINE & OMM

## 2022-06-16 PROCEDURE — 3008F PR BODY MASS INDEX (BMI) DOCUMENTED: ICD-10-PCS | Mod: CPTII,S$GLB,, | Performed by: NEUROMUSCULOSKELETAL MEDICINE & OMM

## 2022-06-16 PROCEDURE — 99999 PR PBB SHADOW E&M-EST. PATIENT-LVL III: CPT | Mod: PBBFAC,,, | Performed by: NEUROMUSCULOSKELETAL MEDICINE & OMM

## 2022-06-16 PROCEDURE — 99999 PR PBB SHADOW E&M-EST. PATIENT-LVL III: ICD-10-PCS | Mod: PBBFAC,,, | Performed by: NEUROMUSCULOSKELETAL MEDICINE & OMM

## 2022-06-16 PROCEDURE — 1126F PR PAIN SEVERITY QUANTIFIED, NO PAIN PRESENT: ICD-10-PCS | Mod: CPTII,S$GLB,, | Performed by: NEUROMUSCULOSKELETAL MEDICINE & OMM

## 2022-06-16 PROCEDURE — 3008F BODY MASS INDEX DOCD: CPT | Mod: CPTII,S$GLB,, | Performed by: NEUROMUSCULOSKELETAL MEDICINE & OMM

## 2022-06-16 PROCEDURE — 1157F PR ADVANCE CARE PLAN OR EQUIV PRESENT IN MEDICAL RECORD: ICD-10-PCS | Mod: CPTII,S$GLB,, | Performed by: NEUROMUSCULOSKELETAL MEDICINE & OMM

## 2022-06-16 PROCEDURE — 1159F MED LIST DOCD IN RCRD: CPT | Mod: CPTII,S$GLB,, | Performed by: NEUROMUSCULOSKELETAL MEDICINE & OMM

## 2022-06-16 PROCEDURE — 99214 OFFICE O/P EST MOD 30 MIN: CPT | Mod: S$GLB,,, | Performed by: NEUROMUSCULOSKELETAL MEDICINE & OMM

## 2022-06-16 PROCEDURE — 3079F DIAST BP 80-89 MM HG: CPT | Mod: CPTII,S$GLB,, | Performed by: NEUROMUSCULOSKELETAL MEDICINE & OMM

## 2022-06-16 PROCEDURE — 1101F PR PT FALLS ASSESS DOC 0-1 FALLS W/OUT INJ PAST YR: ICD-10-PCS | Mod: CPTII,S$GLB,, | Performed by: NEUROMUSCULOSKELETAL MEDICINE & OMM

## 2022-06-16 PROCEDURE — 1126F AMNT PAIN NOTED NONE PRSNT: CPT | Mod: CPTII,S$GLB,, | Performed by: NEUROMUSCULOSKELETAL MEDICINE & OMM

## 2022-06-16 PROCEDURE — 3288F FALL RISK ASSESSMENT DOCD: CPT | Mod: CPTII,S$GLB,, | Performed by: NEUROMUSCULOSKELETAL MEDICINE & OMM

## 2022-06-16 PROCEDURE — 1159F PR MEDICATION LIST DOCUMENTED IN MEDICAL RECORD: ICD-10-PCS | Mod: CPTII,S$GLB,, | Performed by: NEUROMUSCULOSKELETAL MEDICINE & OMM

## 2022-06-16 RX ORDER — AMLODIPINE BESYLATE 2.5 MG/1
2.5 TABLET ORAL DAILY
COMMUNITY
End: 2022-06-16 | Stop reason: SDUPTHER

## 2022-06-16 RX ORDER — AMLODIPINE BESYLATE 2.5 MG/1
2.5 TABLET ORAL DAILY
Qty: 30 TABLET | Refills: 0 | Status: SHIPPED | OUTPATIENT
Start: 2022-06-16 | End: 2022-06-16

## 2022-06-16 NOTE — PROGRESS NOTES
This note was dictated with Modal Fluency a word recognition program. There are occasionally word recognition errors which are missed on review.  Brittany Modi  1955  Review of patient's allergies indicates:  No Known Allergies  [unfilled]    Past Medical History:   Diagnosis Date    Arthritis     Chest pain     GERD (gastroesophageal reflux disease)      Social History     Socioeconomic History    Marital status:     Number of children: 2   Occupational History    Occupation: RN   Tobacco Use    Smoking status: Never Smoker    Smokeless tobacco: Never Used    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at [employer]   Substance and Sexual Activity    Alcohol use: No    Drug use: No    Sexual activity: Yes     Partners: Male     Birth control/protection: None   Social History Narrative    ** Merged History Encounter **          Family History   Problem Relation Age of Onset    Diabetes Mother     Hypertension Mother     Hypothyroidism Mother     Asthma Father     Hypertension Sister     Cancer Sister         breast    Breast cancer Sister     Hypertension Brother     Hypertension Sister     Hypertension Sister     Hypertension Brother     Hypertension Son     Colon cancer Neg Hx     Ovarian cancer Neg Hx        Review of systems:  Constitutional-negative  Eyes-negative  ENT, mouth-negative  Cardiovascular-negative  Respiratory-negative  GI-negative  - negative  Musculoskeletal-negative  Skin-negative  Neurologic-negative  Psychiatric-negative  Endocrine-negative  Hematology/lymph nodes-negative  Allergies/immunology-negative  Brittany Modi  1955  Review of patient's allergies indicates:  No Known Allergies  [unfilled]    Past Medical History:   Diagnosis Date    Arthritis     Chest pain     GERD (gastroesophageal reflux disease)      Social History     Socioeconomic History    Marital status:     Number of children: 2   Occupational History    Occupation: RN   Tobacco Use     Smoking status: Never Smoker    Smokeless tobacco: Never Used    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[facility] [employer]   Substance and Sexual Activity    Alcohol use: No    Drug use: No    Sexual activity: Yes     Partners: Male     Birth control/protection: None   Social History Narrative    ** Merged History Encounter **          Family History   Problem Relation Age of Onset    Diabetes Mother     Hypertension Mother     Hypothyroidism Mother     Asthma Father     Hypertension Sister     Cancer Sister         breast    Breast cancer Sister     Hypertension Brother     Hypertension Sister     Hypertension Sister     Hypertension Brother     Hypertension Son     Colon cancer Neg Hx     Ovarian cancer Neg Hx        Review of systems:  Constitutional-negative  Eyes-negative  ENT, mouth-negative  Cardiovascular-negative  Respiratory-negative  GI-negative  - negative  Musculoskeletal-negative  Skin-negative  Neurologic-negative  Psychiatric-negative  Endocrine-negative  Hematology/lymph nodes-negative  Allergies/immunology-negative  Gen. Appearance: Well-developed with no obvious deformities  Carotid arteries symmetrical pulses  Peripheral vascular shows symmetrical pulses with no obvious edema or tenderness  Social History :  Patient is a retired RN at the [employer]  Present history:   This is a 66-year-old  female presents with a history of involuntary movements of the left hand.  She was initially seen by another neurologist who was a dressing memory issues however patient is able to give a complete detailed history of her problems without any memory issues.  She complains of tremors of the left hand particularly at rest with posturing causing the tremor to disappear the tremors intermittent.  She is concerned about Parkinson's disease.  No family history    Neurological Exam:  Mental status-alert and oriented to person, place, and time; attention span and concentration is good. Fund of knowledge-patient  is aware of current events and able to give detailed history of the current problem.recent and remote memory seems intact. Language function is normal with no evidence of aphasia  Cranial nerves:Visual acuity to hand chart -normal; visual fields to confrontation normal;pupils were equal and reactive to light ;no evidence of ptosis ;  funduscopic examination was normal with sharp disc margins. external ocular movements were full with no nystagmus. Facial sensation to pinprick : normal ; corneal reflexes intact; Facial muscles were symmetrical. Hearing is unimpaired symmetrical finger rub; Tongue movements - normal ; palate movements - normal ;Swallowing unimpaired. Shoulder shrug was intact with good strength Speech was normal  Motor examination: Upper : normal                                      Lower extremities - Normal;muscle tone was normal ;                  Right-handed  Sensory examination:   Upper; normal pinprick and soft touch ;   Lower extremities - normal and symmetrical.   Vibration sense: 15-20 seconds @ toes  Deep tendon reflexes: upper extremities :1-2+ symmetrical ;     lower extremities KJ- 1-2 +; AJ - 1-2+ Both plantar responses were flexor  Cerebellar examination upper: Normal finger to nose and rapid alternating movements  Gait: Steady with no ataxia;      heel and toe walk normal  Romberg test: negative       Tandem gait: Normal    Involuntary movements:  Resting tremor; her gait is normal however it is noticed that the left arm is somewhat stiff walking with occasional tremor   TMJ - no tenderness  Cervical examination: Full range of motion with no pain Cervical tenderness :negative  Lumbar examination: Low back tenderness-negative                  Sciatic notchtenderness-negative            Straight leg raising : negative    Impression:  Left hand resting tremor suggestive of early Parkinson's disease    Recommendations/Plan :  Long discussion with the patient in terms of differential  diagnosis.  The presence of resting tremor and some stiffness of the left arm on walking as highly suggestive of Parkinson's disease.  We discussed medication however at this point since we are not 100% sure that this is Parkinson's disease will hold off till next visit in 2 months to make a decision on whether medication would be appropriate.

## 2022-06-28 ENCOUNTER — TELEPHONE (OUTPATIENT)
Dept: ENDOSCOPY | Facility: HOSPITAL | Age: 67
End: 2022-06-28
Payer: COMMERCIAL

## 2022-06-28 NOTE — TELEPHONE ENCOUNTER
----- Message from Fatimah Rm sent at 6/28/2022 12:28 PM CDT -----  Regarding: self [phone]  Type: Patient Call Back    Who called: self    What is the request in detail: pt would like to schedule appt asap. Epic wouldn't let me schedule.    Can the clinic reply by [facility]? no    Would the patient rather a call back or a response via [facility]? Call back    Best call back number [phone]

## 2022-06-30 ENCOUNTER — PATIENT MESSAGE (OUTPATIENT)
Dept: GASTROENTEROLOGY | Facility: CLINIC | Age: 67
End: 2022-06-30
Payer: COMMERCIAL

## 2022-06-30 ENCOUNTER — PATIENT MESSAGE (OUTPATIENT)
Dept: NEUROLOGY | Facility: CLINIC | Age: 67
End: 2022-06-30
Payer: COMMERCIAL

## 2022-07-21 ENCOUNTER — OFFICE VISIT (OUTPATIENT)
Dept: GASTROENTEROLOGY | Facility: CLINIC | Age: 67
End: 2022-07-21
Payer: COMMERCIAL

## 2022-07-21 ENCOUNTER — TELEPHONE (OUTPATIENT)
Dept: PHARMACY | Facility: CLINIC | Age: 67
End: 2022-07-21
Payer: COMMERCIAL

## 2022-07-21 VITALS
WEIGHT: 176.38 LBS | DIASTOLIC BLOOD PRESSURE: 83 MMHG | SYSTOLIC BLOOD PRESSURE: 141 MMHG | HEIGHT: 65 IN | BODY MASS INDEX: 29.38 KG/M2 | HEART RATE: 85 BPM

## 2022-07-21 DIAGNOSIS — K59.04 CHRONIC IDIOPATHIC CONSTIPATION: Primary | ICD-10-CM

## 2022-07-21 PROCEDURE — 1159F MED LIST DOCD IN RCRD: CPT | Mod: CPTII,S$GLB,, | Performed by: STUDENT IN AN ORGANIZED HEALTH CARE EDUCATION/TRAINING PROGRAM

## 2022-07-21 PROCEDURE — 99499 UNLISTED E&M SERVICE: CPT | Mod: S$GLB,,, | Performed by: STUDENT IN AN ORGANIZED HEALTH CARE EDUCATION/TRAINING PROGRAM

## 2022-07-21 PROCEDURE — 99999 PR PBB SHADOW E&M-EST. PATIENT-LVL III: ICD-10-PCS | Mod: PBBFAC,,, | Performed by: STUDENT IN AN ORGANIZED HEALTH CARE EDUCATION/TRAINING PROGRAM

## 2022-07-21 PROCEDURE — 3008F PR BODY MASS INDEX (BMI) DOCUMENTED: ICD-10-PCS | Mod: CPTII,S$GLB,, | Performed by: STUDENT IN AN ORGANIZED HEALTH CARE EDUCATION/TRAINING PROGRAM

## 2022-07-21 PROCEDURE — 1101F PR PT FALLS ASSESS DOC 0-1 FALLS W/OUT INJ PAST YR: ICD-10-PCS | Mod: CPTII,S$GLB,, | Performed by: STUDENT IN AN ORGANIZED HEALTH CARE EDUCATION/TRAINING PROGRAM

## 2022-07-21 PROCEDURE — 1157F ADVNC CARE PLAN IN RCRD: CPT | Mod: CPTII,S$GLB,, | Performed by: STUDENT IN AN ORGANIZED HEALTH CARE EDUCATION/TRAINING PROGRAM

## 2022-07-21 PROCEDURE — 3077F SYST BP >= 140 MM HG: CPT | Mod: CPTII,S$GLB,, | Performed by: STUDENT IN AN ORGANIZED HEALTH CARE EDUCATION/TRAINING PROGRAM

## 2022-07-21 PROCEDURE — 1126F AMNT PAIN NOTED NONE PRSNT: CPT | Mod: CPTII,S$GLB,, | Performed by: STUDENT IN AN ORGANIZED HEALTH CARE EDUCATION/TRAINING PROGRAM

## 2022-07-21 PROCEDURE — 3079F DIAST BP 80-89 MM HG: CPT | Mod: CPTII,S$GLB,, | Performed by: STUDENT IN AN ORGANIZED HEALTH CARE EDUCATION/TRAINING PROGRAM

## 2022-07-21 PROCEDURE — 3288F PR FALLS RISK ASSESSMENT DOCUMENTED: ICD-10-PCS | Mod: CPTII,S$GLB,, | Performed by: STUDENT IN AN ORGANIZED HEALTH CARE EDUCATION/TRAINING PROGRAM

## 2022-07-21 PROCEDURE — 99999 PR PBB SHADOW E&M-EST. PATIENT-LVL III: CPT | Mod: PBBFAC,,, | Performed by: STUDENT IN AN ORGANIZED HEALTH CARE EDUCATION/TRAINING PROGRAM

## 2022-07-21 PROCEDURE — 3077F PR MOST RECENT SYSTOLIC BLOOD PRESSURE >= 140 MM HG: ICD-10-PCS | Mod: CPTII,S$GLB,, | Performed by: STUDENT IN AN ORGANIZED HEALTH CARE EDUCATION/TRAINING PROGRAM

## 2022-07-21 PROCEDURE — 1157F PR ADVANCE CARE PLAN OR EQUIV PRESENT IN MEDICAL RECORD: ICD-10-PCS | Mod: CPTII,S$GLB,, | Performed by: STUDENT IN AN ORGANIZED HEALTH CARE EDUCATION/TRAINING PROGRAM

## 2022-07-21 PROCEDURE — 1159F PR MEDICATION LIST DOCUMENTED IN MEDICAL RECORD: ICD-10-PCS | Mod: CPTII,S$GLB,, | Performed by: STUDENT IN AN ORGANIZED HEALTH CARE EDUCATION/TRAINING PROGRAM

## 2022-07-21 PROCEDURE — 3288F FALL RISK ASSESSMENT DOCD: CPT | Mod: CPTII,S$GLB,, | Performed by: STUDENT IN AN ORGANIZED HEALTH CARE EDUCATION/TRAINING PROGRAM

## 2022-07-21 PROCEDURE — 1101F PT FALLS ASSESS-DOCD LE1/YR: CPT | Mod: CPTII,S$GLB,, | Performed by: STUDENT IN AN ORGANIZED HEALTH CARE EDUCATION/TRAINING PROGRAM

## 2022-07-21 PROCEDURE — 1126F PR PAIN SEVERITY QUANTIFIED, NO PAIN PRESENT: ICD-10-PCS | Mod: CPTII,S$GLB,, | Performed by: STUDENT IN AN ORGANIZED HEALTH CARE EDUCATION/TRAINING PROGRAM

## 2022-07-21 PROCEDURE — 3008F BODY MASS INDEX DOCD: CPT | Mod: CPTII,S$GLB,, | Performed by: STUDENT IN AN ORGANIZED HEALTH CARE EDUCATION/TRAINING PROGRAM

## 2022-07-21 PROCEDURE — 3079F PR MOST RECENT DIASTOLIC BLOOD PRESSURE 80-89 MM HG: ICD-10-PCS | Mod: CPTII,S$GLB,, | Performed by: STUDENT IN AN ORGANIZED HEALTH CARE EDUCATION/TRAINING PROGRAM

## 2022-07-21 PROCEDURE — 99499 NO LOS: ICD-10-PCS | Mod: S$GLB,,, | Performed by: STUDENT IN AN ORGANIZED HEALTH CARE EDUCATION/TRAINING PROGRAM

## 2022-07-21 RX ORDER — SODIUM, POTASSIUM,MAG SULFATES 17.5-3.13G
1 SOLUTION, RECONSTITUTED, ORAL ORAL DAILY
Qty: 1 KIT | Refills: 0 | Status: SHIPPED | OUTPATIENT
Start: 2022-07-21 | End: 2022-11-17

## 2022-07-21 NOTE — PROGRESS NOTES
[facility] PROGRESS NOTE    Name: Brittany Modi  : 1955  Date of Service: 2022   PCP: Mehdi Tian DO    Reason for visit:   Chief Complaint   Patient presents with    Constipation    Follow-up       HPI: Ms. Brittany Modi is a 67 year old female presenting for a follow-up visit. She has a PMH significant for HTN and possible Parkinson's disease (based on review of neurology clinic visit from 2022). She was last seen in this clinic in 2021 for management of constipation.     Patient reports approximately 5 year duration of constipation associated with straining, production of hard lumpy stools, and decreased stool frequency (one bowel movement approximately every 10 days). She reports requiring use of DUCOLAX suppositories weekly for partial symptom relief. She reports lack of symptom relief with trials of MIRALAX, Lactulose, and AMITIZIA that was prescribed during last office visit. She reports decreased effectiveness of symptom relief with DUCOLAX over the last year. She also reports new diagnosis of HTN since that time with initiation of Amlodipine daily in 2021. She denies symptom association with production of red or black colored stools, family history of gastric or colon cancer, nausea, vomiting, and unintentional weight loss.       Most Recent Upper Endoscopy:   -No prior EGD's on file.     Most Recent Colonoscopy:   -Colonoscopy in 2019 for screening that was entirely normal.     Review of Systems:   Review of Systems   Constitutional: Negative for chills, fever, malaise/fatigue and weight loss.   HENT: Negative for congestion and sore throat.    Eyes: Negative for blurred vision and double vision.   Respiratory: Negative for cough, sputum production and shortness of breath.    Cardiovascular: Negative for chest pain, palpitations and leg swelling.   Gastrointestinal: Positive for constipation. Negative for abdominal pain, diarrhea, heartburn,  "nausea and vomiting.   Genitourinary: Negative for dysuria and urgency.   Musculoskeletal: Negative for back pain, myalgias and neck pain.   Neurological: Negative for dizziness, tingling, sensory change, weakness and headaches.   Endo/Heme/Allergies: Negative for polydipsia.       Medications:   Current Outpatient Medications:     amLODIPine (NORVASC) 2.5 MG tablet, TAKE 1 TABLET(2.5 MG) BY MOUTH EVERY DAY, Disp: 90 tablet, Rfl: 3    cholecalciferol, vitamin D3, 125 mcg (5,000 unit) Tab, Take 5,000 Units by mouth once daily., Disp: , Rfl:     fish oil-omega-3 fatty acids 300-1,000 mg capsule, Take 2 g by mouth once daily., Disp: , Rfl:     multivitamin capsule, Take 1 capsule by mouth once daily., Disp: , Rfl:     linaCLOtide (LINZESS) 145 mcg Cap capsule, Take 1 capsule (145 mcg total) by mouth before breakfast., Disp: 30 capsule, Rfl: 11    sodium,potassium,mag sulfates (SUPREP BOWEL PREP KIT) 17.5-3.13-1.6 gram SolR, Take 177 mLs by mouth once daily. Medication is to be taken once prior to initiation of Linzes, Disp: 1 kit, Rfl: 0     Past medical history, past surgical history, family history, allergies, and social history reviewed and updated in EMR.     Vitals:   Vitals:    07/21/22 1532   BP: (!) 141/83   Pulse: 85   Weight: 80 kg (176 lb 5.9 oz)   Height: 5' 5" (1.651 m)     Body mass index is 29.35 kg/m².    Physical Exam:   Physical Exam  Constitutional:       Appearance: Normal appearance.   Eyes:      General: No scleral icterus.     Conjunctiva/sclera: Conjunctivae normal.   Cardiovascular:      Rate and Rhythm: Normal rate and regular rhythm.      Pulses: Normal pulses.      Heart sounds: Normal heart sounds.   Pulmonary:      Effort: Pulmonary effort is normal. No respiratory distress.      Breath sounds: Normal breath sounds.   Abdominal:      General: Bowel sounds are normal. There is distension.      Palpations: Abdomen is soft.      Tenderness: There is no abdominal tenderness.   Skin:     " General: Skin is warm and dry.      Findings: No bruising or rash.   Neurological:      Mental Status: She is alert and oriented to person, place, and time.           Assessment:   This a 67 year old female with a PMH significant for HTN and possible Parkinson's disease who is presenting for a follow-up visit for management of constipation since approximately 2017 that has been refractory to trials of osmotic and stimulant laxatives and AMITIZA. She is already status post normal colonoscopy in 2019. Work-up for diabetes and thyroid dysfunction is unremarkable. Etiology of constipation is likely idiopathic versus secondary to underlying Parkinson's disease and possibly being exacerbated by initiation of calcium channel blocker for HTN.     Plan:   -Trial of LINZESS daily for attempted relief.   -Handout provided on lifestyle modifications.     Disposition: Follow-up in 2-3 months.     Discussed with Dr. Fam.         Rishi Torres MD, PGY-V  Gastroenterology Fellow  [facility]       monitor

## 2022-08-05 ENCOUNTER — PATIENT MESSAGE (OUTPATIENT)
Dept: NEUROLOGY | Facility: CLINIC | Age: 67
End: 2022-08-05
Payer: COMMERCIAL

## 2022-08-05 ENCOUNTER — TELEPHONE (OUTPATIENT)
Dept: NEUROLOGY | Facility: CLINIC | Age: 67
End: 2022-08-05
Payer: COMMERCIAL

## 2022-08-05 NOTE — TELEPHONE ENCOUNTER
----- Message from [facility] sent at 8/5/2022  9:20 AM CDT -----  Type:  Needs Medical Advice    Who Called: PT   Would the patient rather a call back or a response via [facility]? Callback   Best Call Back Number: [phone]  Additional Information: Pt requesting callback from office to discuss scheduling appt with new provider since Dr. Arciniega is retiring. Pt is looking for recommendations.

## 2022-08-31 DIAGNOSIS — Z78.0 MENOPAUSE: ICD-10-CM

## 2022-10-10 ENCOUNTER — OFFICE VISIT (OUTPATIENT)
Dept: NEUROLOGY | Facility: CLINIC | Age: 67
End: 2022-10-10
Payer: COMMERCIAL

## 2022-10-10 VITALS
HEIGHT: 65 IN | DIASTOLIC BLOOD PRESSURE: 79 MMHG | BODY MASS INDEX: 30.04 KG/M2 | WEIGHT: 180.31 LBS | SYSTOLIC BLOOD PRESSURE: 177 MMHG | HEART RATE: 73 BPM

## 2022-10-10 DIAGNOSIS — R25.1 TREMOR OF LEFT HAND: Primary | ICD-10-CM

## 2022-10-10 PROCEDURE — 99999 PR PBB SHADOW E&M-EST. PATIENT-LVL IV: CPT | Mod: PBBFAC,,, | Performed by: STUDENT IN AN ORGANIZED HEALTH CARE EDUCATION/TRAINING PROGRAM

## 2022-10-10 PROCEDURE — 99999 PR PBB SHADOW E&M-EST. PATIENT-LVL IV: ICD-10-PCS | Mod: PBBFAC,,, | Performed by: STUDENT IN AN ORGANIZED HEALTH CARE EDUCATION/TRAINING PROGRAM

## 2022-10-10 RX ORDER — DIAZEPAM 2 MG/1
2 TABLET ORAL ONCE
Qty: 1 TABLET | Refills: 0 | Status: SHIPPED | OUTPATIENT
Start: 2022-10-10 | End: 2022-11-17

## 2022-10-10 NOTE — PROGRESS NOTES
[facility] - NEUROLOGY [address]TH [address] [facility], [place]    Date: 10/10/22  Patient Name: Brittany Modi   MRN: [id number]   PCP: Mehdi Tian  Referring Provider: No ref. provider found    Assessment:   Brittany Modi is a 67 y.o. female presenting as initial evaluation for me for L hand tremors that started 5 months ago. She has been seen by Dr. Arreola and that time tremor were positional per his note. She refers that these are more frequent, more debilitating and they can worsen if she is agitated or stressed out. She has control of the tremor and it can radiate into her arm if she holds her hand and they stop if the elevates it. On exam tremor is non consistent, varies from finger tapping, to wrist tremor, non rhythmical with varying amplitude and distractible. There is no cogwheel rigidity or bradykinesia, no spasticity, gate with normal base and pull test negative. Overall less concerning for neurodegenerative/primary movement disorder she does have positive functional components on her exam.     Plan:     -- Discussed that at the moment not concerned for primary movement disorder but will evaluate with Brain MRI to look specifically at basal ganglia.   -- Will hold on medication at this moment and will call patient back w MRI results   -- Follow up in 3 months   Problem List Items Addressed This Visit    None  Visit Diagnoses       Tremor of left hand    -  Primary    Relevant Orders    MRI Brain Without Contrast            Dulce Patterson MD    Patient note was created using MModal Dictation.  Any errors in syntax or even information may not have been identified and edited on initial review prior to signing this note.  Subjective:   Patient seen in consultation at the request of No ref. provider found for the evaluation of tremor. A copy of this note will be sent to the referring physician.        HPI:   Ms. Brittany Modi is a 67 y.o. R handed female w PMH  HTN, constipation presenting as initial evaluation for left hand tremor. She was previously seen by Dr. Arreola and Dr. Arciniega (last 5/16/22) for memory problems and tremors, but she wants to change neurologist.   She is having involuntary movement on left hand, started 5 months, she says that now they are more vigorous and more often than it was, it is daily and constant, its worse at rest and subsided with action or lifting. Sami in May when she saw Dr Arreola they were finer movements and not as debilitating. She refers that if feels like her muscles feel tired and they are stiff. No aching, no pain. Weakness at the fingers and worsens at the end of day, some numbness but no tingling. She can slow it down if she holds it.. Triggers/exacerbated if she is upset/anxious. She refers that she has coordination/balance issues and she has not been able to walk a line for 1 month.   Denies any other medical issues other than HTN and recently constipation. No family history of neurodegenerative/movement disorders. Non smoker, no alcohol use.       PAST MEDICAL HISTORY:  Past Medical History:   Diagnosis Date    Arthritis     Chest pain     GERD (gastroesophageal reflux disease)        PAST SURGICAL HISTORY:  Past Surgical History:   Procedure Laterality Date    abdominal surgery      abdominoplasty    APPENDECTOMY      2010    COLONOSCOPY      COLONOSCOPY N/A 8/22/2019    Procedure: COLONOSCOPY;  Surgeon: Rose Mary Ervin MD;  Location: [address];  Service: Endoscopy;  Laterality: N/A;  no PM prep    CYSTOSCOPY N/A 12/5/2019    Procedure: CYSTOSCOPY;  Surgeon: Geri Azar MD;  Location: [facility];  Service: OB/GYN;  Laterality: N/A;    HYSTERECTOMY      JOINT REPLACEMENT      KNEE SURGERY Right 12-21-15    TKR    KNEE SURGERY Right 12/2015    TKR    LAPAROSCOPIC TOTAL HYSTERECTOMY N/A 12/5/2019    Procedure: HYSTERECTOMY, TOTAL, LAPAROSCOPIC;  Surgeon: Geri Azar MD;  Location: [facility];  Service:  OB/GYN;  Laterality: N/A;    TUBAL LIGATION      age 30's       CURRENT MEDS:  Current Outpatient Medications   Medication Sig Dispense Refill    linaCLOtide (LINZESS) 145 mcg Cap capsule Take 1 capsule (145 mcg total) by mouth before breakfast. 30 capsule 11    multivitamin capsule Take 1 capsule by mouth once daily.      amLODIPine (NORVASC) 2.5 MG tablet TAKE 1 TABLET(2.5 MG) BY MOUTH EVERY DAY (Patient not taking: Reported on 10/10/2022) 90 tablet 3    cholecalciferol, vitamin D3, 125 mcg (5,000 unit) Tab Take 5,000 Units by mouth once daily.      diazePAM (VALIUM) 2 MG tablet Take 1 tablet (2 mg total) by mouth once. for 1 dose 1 tablet 0    fish oil-omega-3 fatty acids 300-1,000 mg capsule Take 2 g by mouth once daily.      sodium,potassium,mag sulfates (SUPREP BOWEL PREP KIT) 17.5-3.13-1.6 gram SolR Take 177 mLs by mouth once daily. Medication is to be taken once prior to initiation of Linzes (Patient not taking: Reported on 10/10/2022) 1 kit 0     No current facility-administered medications for this visit.       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      Objective:     Vitals:    10/10/22 1450   BP: (!) 177/79   BP Location: Right arm   Patient Position: Sitting   BP Method:  "Large (Automatic)   Pulse: 73   Weight: 81.8 kg (180 lb 5.4 oz)   Height: 5' 5" (1.651 m)     General: NAD, well nourished   Eyes: no tearing, discharge, no erythema   ENT: moist mucous membranes of the oral cavity, nares patent    Neck: Supple, full range of motion  Cardiovascular: Warm and well perfused, pulses equal and symmetrical  Lungs: Normal work of breathing, normal chest wall excursions  Skin: No rash, lesions, or breakdown on exposed skin  Psychiatry: Mood and affect are appropriate   Abdomen: soft, non tender, non distended  Extremeties: No cyanosis, clubbing or edema.    Neurological   MENTAL STATUS: Alert and oriented to person, place, and time. Attention and concentration within normal limits. Speech without dysarthria, able to name and repeat without difficulty. Recent and remote memory within normal limits   CRANIAL NERVES: Visual fields intact. PERRL. EOMI. Facial sensation intact. Face symmetrical. Hearing grossly intact. Full shoulder shrug bilaterally. Tongue protrudes midline   SENSORY: Sensation is intact to light touch and vibration throughout.  Joint position perception intact. Negative Romberg.   MOTOR: Normal bulk and tone. No pronator drift.  5/5 deltoid, biceps, triceps, interosseous, hand  bilaterally. 5/5 iliopsoas, knee extension/flexion, foot dorsi/plantarflexion bilaterally.    REFLEXES: Symmetric and 2+ throughout. Toes down going bilaterally.   CEREBELLAR/COORDINATION/GAIT: Gait steady with normal arm swing and stride length.  Heel to shin intact. Finger to nose intact. High amplitude tremor that changes from finger tip, to arm to shoulder. Stops when lifting arm up. Dysmetria with finger taping. Pull test negative          05/27/22 MOCA by Dr. Arreola: 23/30  Visuospatial/Executive 4, Naming 3, Attention 4, Language 3, Abstraction 2, Delayed Recall 2, Orientation 5    "

## 2022-10-21 ENCOUNTER — TELEPHONE (OUTPATIENT)
Dept: NEUROLOGY | Facility: CLINIC | Age: 67
End: 2022-10-21

## 2022-10-21 NOTE — TELEPHONE ENCOUNTER
----- Message from Tremontana Chevalier sent at 10/21/2022  1:43 PM CDT -----  Regarding: appt/advice  #Brittany Modi calling AGAIN  s/w Dr. Dulce Elder re f/u MRIfor involuntary movement. 2 mg valium pt says is not high enough dosage. Pls call pt @ [phone].

## 2022-10-24 ENCOUNTER — HOSPITAL ENCOUNTER (OUTPATIENT)
Dept: RADIOLOGY | Facility: HOSPITAL | Age: 67
Discharge: HOME | End: 2022-10-24
Attending: STUDENT IN AN ORGANIZED HEALTH CARE EDUCATION/TRAINING PROGRAM
Payer: COMMERCIAL

## 2022-10-24 DIAGNOSIS — R25.1 TREMOR OF LEFT HAND: ICD-10-CM

## 2022-10-24 PROCEDURE — 70551 MRI BRAIN STEM W/O DYE: CPT | Mod: TC

## 2022-10-24 PROCEDURE — 70551 MRI BRAIN STEM W/O DYE: CPT | Mod: 26,,, | Performed by: RADIOLOGY

## 2022-10-24 PROCEDURE — 70551 MRI BRAIN WITHOUT CONTRAST: ICD-10-PCS | Mod: 26,,, | Performed by: RADIOLOGY

## 2022-10-27 ENCOUNTER — TELEPHONE (OUTPATIENT)
Dept: NEUROLOGY | Facility: CLINIC | Age: 67
End: 2022-10-27

## 2022-10-27 NOTE — TELEPHONE ENCOUNTER
----- Message from Trem[place]ana Chevalier sent at 10/27/2022  9:52 AM CDT -----  Regarding: pt advice/results  Pt says had MRI this week and wants to spk with someone in Dr ROSARIO REYNAGA STAFF to discuss results. Pls call pt @ [phone]

## 2022-10-28 NOTE — TELEPHONE ENCOUNTER
Called patient to discuss MRI results. No abnormality to explain patient's tremor.   We have follow up scheduled in January   Dulce Patterson MD   Neurology Resident, PGY3  [facility] [address]

## 2022-11-07 ENCOUNTER — OFFICE VISIT (OUTPATIENT)
Dept: URGENT CARE | Facility: CLINIC | Age: 67
End: 2022-11-07
Payer: COMMERCIAL

## 2022-11-07 VITALS
HEART RATE: 66 BPM | RESPIRATION RATE: 18 BRPM | DIASTOLIC BLOOD PRESSURE: 83 MMHG | HEIGHT: 65 IN | BODY MASS INDEX: 29.99 KG/M2 | SYSTOLIC BLOOD PRESSURE: 137 MMHG | WEIGHT: 180 LBS | OXYGEN SATURATION: 95 % | TEMPERATURE: 98 F

## 2022-11-07 DIAGNOSIS — J06.9 VIRAL URI WITH COUGH: Primary | ICD-10-CM

## 2022-11-07 DIAGNOSIS — R05.9 COUGH, UNSPECIFIED TYPE: ICD-10-CM

## 2022-11-07 LAB
CTP QC/QA: YES
POC MOLECULAR INFLUENZA A AGN: NEGATIVE
POC MOLECULAR INFLUENZA B AGN: NEGATIVE

## 2022-11-07 PROCEDURE — 3008F BODY MASS INDEX DOCD: CPT | Mod: CPTII,S$GLB,, | Performed by: NURSE PRACTITIONER

## 2022-11-07 PROCEDURE — 3075F PR MOST RECENT SYSTOLIC BLOOD PRESS GE 130-139MM HG: ICD-10-PCS | Mod: CPTII,S$GLB,, | Performed by: NURSE PRACTITIONER

## 2022-11-07 PROCEDURE — 3075F SYST BP GE 130 - 139MM HG: CPT | Mod: CPTII,S$GLB,, | Performed by: NURSE PRACTITIONER

## 2022-11-07 PROCEDURE — 1159F MED LIST DOCD IN RCRD: CPT | Mod: CPTII,S$GLB,, | Performed by: NURSE PRACTITIONER

## 2022-11-07 PROCEDURE — 1159F PR MEDICATION LIST DOCUMENTED IN MEDICAL RECORD: ICD-10-PCS | Mod: CPTII,S$GLB,, | Performed by: NURSE PRACTITIONER

## 2022-11-07 PROCEDURE — 3079F PR MOST RECENT DIASTOLIC BLOOD PRESSURE 80-89 MM HG: ICD-10-PCS | Mod: CPTII,S$GLB,, | Performed by: NURSE PRACTITIONER

## 2022-11-07 PROCEDURE — 87502 POCT INFLUENZA A/B MOLECULAR: ICD-10-PCS | Mod: QW,S$GLB,, | Performed by: NURSE PRACTITIONER

## 2022-11-07 PROCEDURE — 1157F ADVNC CARE PLAN IN RCRD: CPT | Mod: CPTII,S$GLB,, | Performed by: NURSE PRACTITIONER

## 2022-11-07 PROCEDURE — 1160F RVW MEDS BY RX/DR IN RCRD: CPT | Mod: CPTII,S$GLB,, | Performed by: NURSE PRACTITIONER

## 2022-11-07 PROCEDURE — 3008F PR BODY MASS INDEX (BMI) DOCUMENTED: ICD-10-PCS | Mod: CPTII,S$GLB,, | Performed by: NURSE PRACTITIONER

## 2022-11-07 PROCEDURE — 99203 PR OFFICE/OUTPT VISIT, NEW, LEVL III, 30-44 MIN: ICD-10-PCS | Mod: S$GLB,,, | Performed by: NURSE PRACTITIONER

## 2022-11-07 PROCEDURE — 1157F PR ADVANCE CARE PLAN OR EQUIV PRESENT IN MEDICAL RECORD: ICD-10-PCS | Mod: CPTII,S$GLB,, | Performed by: NURSE PRACTITIONER

## 2022-11-07 PROCEDURE — 87502 INFLUENZA DNA AMP PROBE: CPT | Mod: QW,S$GLB,, | Performed by: NURSE PRACTITIONER

## 2022-11-07 PROCEDURE — 99203 OFFICE O/P NEW LOW 30 MIN: CPT | Mod: S$GLB,,, | Performed by: NURSE PRACTITIONER

## 2022-11-07 PROCEDURE — 1125F PR PAIN SEVERITY QUANTIFIED, PAIN PRESENT: ICD-10-PCS | Mod: CPTII,S$GLB,, | Performed by: NURSE PRACTITIONER

## 2022-11-07 PROCEDURE — 1125F AMNT PAIN NOTED PAIN PRSNT: CPT | Mod: CPTII,S$GLB,, | Performed by: NURSE PRACTITIONER

## 2022-11-07 PROCEDURE — 3079F DIAST BP 80-89 MM HG: CPT | Mod: CPTII,S$GLB,, | Performed by: NURSE PRACTITIONER

## 2022-11-07 PROCEDURE — 1160F PR REVIEW ALL MEDS BY PRESCRIBER/CLIN PHARMACIST DOCUMENTED: ICD-10-PCS | Mod: CPTII,S$GLB,, | Performed by: NURSE PRACTITIONER

## 2022-11-07 RX ORDER — PROMETHAZINE HYDROCHLORIDE AND DEXTROMETHORPHAN HYDROBROMIDE 6.25; 15 MG/5ML; MG/5ML
5 SYRUP ORAL EVERY 8 HOURS PRN
Qty: 118 ML | Refills: 0 | Status: SHIPPED | OUTPATIENT
Start: 2022-11-07 | End: 2022-11-17

## 2022-11-07 RX ORDER — IPRATROPIUM BROMIDE 21 UG/1
2 SPRAY, METERED NASAL 2 TIMES DAILY
Qty: 30 ML | Refills: 0 | Status: SHIPPED | OUTPATIENT
Start: 2022-11-07 | End: 2022-11-14

## 2022-11-07 NOTE — PROGRESS NOTES
"Subjective:       Patient ID: Brittany Modi is a 67 y.o. female.    Vitals:  height is 5' 5" (1.651 m) and weight is 81.6 kg (180 lb). Her temperature is 98.2 °F (36.8 °C). Her blood pressure is 137/83 and her pulse is 66. Her respiration is 18 and oxygen saturation is 95%.     Chief Complaint: Cough    Ms. Modi comes to the clinic with complaint of subjective fever, cough, sinus congestion, earache and headache.  No known sick contacts.  Patient is immunized against COVID-19.    Cough  This is a new problem. The current episode started 1 to 4 weeks ago. The problem has been unchanged. The problem occurs constantly. The cough is Productive of sputum. Associated symptoms include chills, ear pain, headaches, nasal congestion, postnasal drip, a sore throat and shortness of breath. Pertinent negatives include no chest pain, ear congestion, fever, heartburn, hemoptysis, myalgias, rash, rhinorrhea, sweats, weight loss or wheezing. Treatments tried: mucinex,delysum. The treatment provided no relief. There is no history of asthma, bronchiectasis, bronchitis, COPD, emphysema, environmental allergies or pneumonia.     Constitution: Positive for chills. Negative for fever.   HENT:  Positive for ear pain, postnasal drip and sore throat.    Cardiovascular:  Negative for chest pain.   Respiratory:  Positive for cough and shortness of breath. Negative for bloody sputum and wheezing.    Gastrointestinal:  Negative for heartburn.   Musculoskeletal:  Negative for muscle ache.   Skin:  Negative for rash.   Allergic/Immunologic: Negative for environmental allergies.   Neurological:  Positive for headaches.     Objective:      Physical Exam   Constitutional: She is oriented to person, place, and time. She appears well-developed. She is cooperative.  Non-toxic appearance. She does not appear ill. No distress.   HENT:   Head: Normocephalic and atraumatic.   Ears:   Right Ear: Hearing, tympanic membrane, external ear and ear canal " normal.   Left Ear: Hearing, tympanic membrane, external ear and ear canal normal.   Nose: Mucosal edema and rhinorrhea present. No nasal deformity. No epistaxis. Right sinus exhibits no maxillary sinus tenderness and no frontal sinus tenderness. Left sinus exhibits no maxillary sinus tenderness and no frontal sinus tenderness.   Mouth/Throat: Uvula is midline, oropharynx is clear and moist and mucous membranes are normal. No trismus in the jaw. Normal dentition. No uvula swelling. Cobblestoning present. No oropharyngeal exudate, posterior oropharyngeal edema or posterior oropharyngeal erythema.   Eyes: Conjunctivae and lids are normal. No scleral icterus.   Neck: Trachea normal and phonation normal. Neck supple. No edema present. No erythema present. No neck rigidity present.   Cardiovascular: Normal rate, regular rhythm, normal heart sounds and normal pulses.   Pulmonary/Chest: Effort normal and breath sounds normal. No respiratory distress. She has no decreased breath sounds. She has no rhonchi.   Abdominal: Normal appearance.   Musculoskeletal: Normal range of motion.         General: No deformity. Normal range of motion.   Neurological: She is alert and oriented to person, place, and time. She exhibits normal muscle tone. Coordination normal.   Skin: Skin is warm, dry, intact, not diaphoretic and not pale.   Psychiatric: Her speech is normal and behavior is normal. Judgment and thought content normal.   Nursing note and vitals reviewed.      Results for orders placed or performed in visit on 11/07/22   POCT Influenza A/B MOLECULAR   Result Value Ref Range    POC Molecular Influenza A Ag Negative Negative, Not Reported    POC Molecular Influenza B Ag Negative Negative, Not Reported     Acceptable Yes        Assessment:       1. Viral URI with cough    2. Cough, unspecified type          Plan:       Labs ordered at this visit reviewed.     Viral URI with cough  -     promethazine-dextromethorphan  (PROMETHAZINE-DM) 6.25-15 mg/5 mL Syrp; Take 5 mLs by mouth every 8 (eight) hours as needed (cough).  Dispense: 118 mL; Refill: 0  -     ipratropium (ATROVENT) 21 mcg (0.03 %) nasal spray; 2 sprays by Nasal route 2 (two) times daily. for 7 days  Dispense: 30 mL; Refill: 0    Cough, unspecified type  -     POCT Influenza A/B MOLECULAR  -     promethazine-dextromethorphan (PROMETHAZINE-DM) 6.25-15 mg/5 mL Syrp; Take 5 mLs by mouth every 8 (eight) hours as needed (cough).  Dispense: 118 mL; Refill: 0

## 2022-11-17 ENCOUNTER — OFFICE VISIT (OUTPATIENT)
Dept: OBSTETRICS AND GYNECOLOGY | Facility: CLINIC | Age: 67
End: 2022-11-17
Payer: COMMERCIAL

## 2022-11-17 VITALS
BODY MASS INDEX: 29.75 KG/M2 | SYSTOLIC BLOOD PRESSURE: 132 MMHG | WEIGHT: 178.56 LBS | DIASTOLIC BLOOD PRESSURE: 74 MMHG | HEIGHT: 65 IN

## 2022-11-17 DIAGNOSIS — N95.1 MENOPAUSE SYNDROME: Primary | ICD-10-CM

## 2022-11-17 PROBLEM — Z12.11 ENCOUNTER FOR SCREENING COLONOSCOPY: Status: RESOLVED | Noted: 2019-08-22 | Resolved: 2022-11-17

## 2022-11-17 PROBLEM — N95.0 POST-MENOPAUSAL BLEEDING: Status: RESOLVED | Noted: 2019-12-05 | Resolved: 2022-11-17

## 2022-11-17 PROBLEM — Z90.710 S/P LAPAROSCOPIC HYSTERECTOMY: Status: RESOLVED | Noted: 2019-12-05 | Resolved: 2022-11-17

## 2022-11-17 PROCEDURE — 1159F PR MEDICATION LIST DOCUMENTED IN MEDICAL RECORD: ICD-10-PCS | Mod: CPTII,S$GLB,, | Performed by: OBSTETRICS & GYNECOLOGY

## 2022-11-17 PROCEDURE — 99999 PR PBB SHADOW E&M-EST. PATIENT-LVL III: CPT | Mod: PBBFAC,,, | Performed by: OBSTETRICS & GYNECOLOGY

## 2022-11-17 PROCEDURE — 3288F FALL RISK ASSESSMENT DOCD: CPT | Mod: CPTII,S$GLB,, | Performed by: OBSTETRICS & GYNECOLOGY

## 2022-11-17 PROCEDURE — 99203 PR OFFICE/OUTPT VISIT, NEW, LEVL III, 30-44 MIN: ICD-10-PCS | Mod: S$GLB,,, | Performed by: OBSTETRICS & GYNECOLOGY

## 2022-11-17 PROCEDURE — 1101F PR PT FALLS ASSESS DOC 0-1 FALLS W/OUT INJ PAST YR: ICD-10-PCS | Mod: CPTII,S$GLB,, | Performed by: OBSTETRICS & GYNECOLOGY

## 2022-11-17 PROCEDURE — 3288F PR FALLS RISK ASSESSMENT DOCUMENTED: ICD-10-PCS | Mod: CPTII,S$GLB,, | Performed by: OBSTETRICS & GYNECOLOGY

## 2022-11-17 PROCEDURE — 3075F PR MOST RECENT SYSTOLIC BLOOD PRESS GE 130-139MM HG: ICD-10-PCS | Mod: CPTII,S$GLB,, | Performed by: OBSTETRICS & GYNECOLOGY

## 2022-11-17 PROCEDURE — 3008F BODY MASS INDEX DOCD: CPT | Mod: CPTII,S$GLB,, | Performed by: OBSTETRICS & GYNECOLOGY

## 2022-11-17 PROCEDURE — 1101F PT FALLS ASSESS-DOCD LE1/YR: CPT | Mod: CPTII,S$GLB,, | Performed by: OBSTETRICS & GYNECOLOGY

## 2022-11-17 PROCEDURE — 1126F PR PAIN SEVERITY QUANTIFIED, NO PAIN PRESENT: ICD-10-PCS | Mod: CPTII,S$GLB,, | Performed by: OBSTETRICS & GYNECOLOGY

## 2022-11-17 PROCEDURE — 3075F SYST BP GE 130 - 139MM HG: CPT | Mod: CPTII,S$GLB,, | Performed by: OBSTETRICS & GYNECOLOGY

## 2022-11-17 PROCEDURE — 99203 OFFICE O/P NEW LOW 30 MIN: CPT | Mod: S$GLB,,, | Performed by: OBSTETRICS & GYNECOLOGY

## 2022-11-17 PROCEDURE — 99999 PR PBB SHADOW E&M-EST. PATIENT-LVL III: ICD-10-PCS | Mod: PBBFAC,,, | Performed by: OBSTETRICS & GYNECOLOGY

## 2022-11-17 PROCEDURE — 3008F PR BODY MASS INDEX (BMI) DOCUMENTED: ICD-10-PCS | Mod: CPTII,S$GLB,, | Performed by: OBSTETRICS & GYNECOLOGY

## 2022-11-17 PROCEDURE — 3078F DIAST BP <80 MM HG: CPT | Mod: CPTII,S$GLB,, | Performed by: OBSTETRICS & GYNECOLOGY

## 2022-11-17 PROCEDURE — 3078F PR MOST RECENT DIASTOLIC BLOOD PRESSURE < 80 MM HG: ICD-10-PCS | Mod: CPTII,S$GLB,, | Performed by: OBSTETRICS & GYNECOLOGY

## 2022-11-17 PROCEDURE — 1126F AMNT PAIN NOTED NONE PRSNT: CPT | Mod: CPTII,S$GLB,, | Performed by: OBSTETRICS & GYNECOLOGY

## 2022-11-17 PROCEDURE — 1157F ADVNC CARE PLAN IN RCRD: CPT | Mod: CPTII,S$GLB,, | Performed by: OBSTETRICS & GYNECOLOGY

## 2022-11-17 PROCEDURE — 1157F PR ADVANCE CARE PLAN OR EQUIV PRESENT IN MEDICAL RECORD: ICD-10-PCS | Mod: CPTII,S$GLB,, | Performed by: OBSTETRICS & GYNECOLOGY

## 2022-11-17 PROCEDURE — 1159F MED LIST DOCD IN RCRD: CPT | Mod: CPTII,S$GLB,, | Performed by: OBSTETRICS & GYNECOLOGY

## 2022-11-17 NOTE — PROGRESS NOTES
PT HERE FOR NIGHT SWEATS AND HOT FLASHES FOR THE LAST 3 MONTHS.  HAD USED PELLETS BEFORE TLH/BSO 2019.    ROS:  GENERAL: No fever, chills, fatigability or weight loss.  VULVAR: No pain, no lesions and no itching.  VAGINAL: No relaxation, no itching, no discharge, no abnormal bleeding and no lesions.  ABDOMEN: No abdominal pain. Denies nausea. Denies vomiting. No diarrhea. No constipation  BREAST: Denies pain. No lumps. No discharge.  URINARY: No incontinence, no nocturia, no frequency and no dysuria.  CARDIOVASCULAR: No chest pain. No shortness of breath. No leg cramps.  NEUROLOGICAL: No headaches. No vision changes.  The remainder of the review of systems was negative.    PE:   General Appearance: overweight Well developed. Well nourished. In no acute distress.    PROCEDURES:    DIAGNOSIS:  1. Menopause syndrome        PLAN:     MEDICATIONS & ORDERS:       30 MIN Patient was counseled today on A.C.S. Pap guidelines and recommendations for yearly pelvic exams, mammograms and monthly self breast exams; to see her PCP for other health maintenance and the increased risks of CVD, MI, VTE, CVA , and Invasive Breast Cancer on Prempro and the increased risk of CVA with Premarin as reported by the [employer] studies; the benefits of HRT/ERT; her personal risks which include; alternative therapies for vasomotor symptoms (not FDA approved) including soy, black cohosh, Vit E and avoidance of triggers such as cigarette smoking, alcohol, humidity, stress and caffeine; alternative Rxs for treatment of menopause symptoms such as antidepressants or Clonidine. Counseling session lasted approximately minutes, and all her questions were completely answered.      FOLLOW-UP: With me PRN. SHE IS INTERESTED IN HERBAL.    Ottoniel Goins Jr, MD, FACOG

## 2023-01-09 ENCOUNTER — LAB VISIT (OUTPATIENT)
Dept: LAB | Facility: HOSPITAL | Age: 68
End: 2023-01-09
Payer: COMMERCIAL

## 2023-01-09 ENCOUNTER — OFFICE VISIT (OUTPATIENT)
Dept: NEUROLOGY | Facility: CLINIC | Age: 68
End: 2023-01-09
Payer: COMMERCIAL

## 2023-01-09 VITALS
HEART RATE: 77 BPM | DIASTOLIC BLOOD PRESSURE: 80 MMHG | HEIGHT: 65 IN | BODY MASS INDEX: 29.66 KG/M2 | WEIGHT: 178 LBS | SYSTOLIC BLOOD PRESSURE: 145 MMHG

## 2023-01-09 DIAGNOSIS — F44.4 FUNCTIONAL NEUROLOGICAL SYMPTOM DISORDER WITH ABNORMAL MOVEMENT: Primary | ICD-10-CM

## 2023-01-09 DIAGNOSIS — R25.1 TREMOR OF LEFT HAND: ICD-10-CM

## 2023-01-09 DIAGNOSIS — F44.4 FUNCTIONAL NEUROLOGICAL SYMPTOM DISORDER WITH ABNORMAL MOVEMENT: ICD-10-CM

## 2023-01-09 LAB
FOLATE SERPL-MCNC: 17.7 NG/ML (ref 4–24)
T4 FREE SERPL-MCNC: 0.92 NG/DL (ref 0.71–1.51)
TSH SERPL DL<=0.005 MIU/L-ACNC: 1.37 UIU/ML (ref 0.4–4)

## 2023-01-09 PROCEDURE — 1157F ADVNC CARE PLAN IN RCRD: CPT | Mod: ,,, | Performed by: STUDENT IN AN ORGANIZED HEALTH CARE EDUCATION/TRAINING PROGRAM

## 2023-01-09 PROCEDURE — 84425 ASSAY OF VITAMIN B-1: CPT | Performed by: STUDENT IN AN ORGANIZED HEALTH CARE EDUCATION/TRAINING PROGRAM

## 2023-01-09 PROCEDURE — 84439 ASSAY OF FREE THYROXINE: CPT | Performed by: STUDENT IN AN ORGANIZED HEALTH CARE EDUCATION/TRAINING PROGRAM

## 2023-01-09 PROCEDURE — 99999 PR PBB SHADOW E&M-EST. PATIENT-LVL V: ICD-10-PCS | Mod: PBBFAC,,, | Performed by: STUDENT IN AN ORGANIZED HEALTH CARE EDUCATION/TRAINING PROGRAM

## 2023-01-09 PROCEDURE — 1157F PR ADVANCE CARE PLAN OR EQUIV PRESENT IN MEDICAL RECORD: ICD-10-PCS | Mod: ,,, | Performed by: STUDENT IN AN ORGANIZED HEALTH CARE EDUCATION/TRAINING PROGRAM

## 2023-01-09 PROCEDURE — 82607 VITAMIN B-12: CPT | Performed by: STUDENT IN AN ORGANIZED HEALTH CARE EDUCATION/TRAINING PROGRAM

## 2023-01-09 PROCEDURE — 84443 ASSAY THYROID STIM HORMONE: CPT | Performed by: STUDENT IN AN ORGANIZED HEALTH CARE EDUCATION/TRAINING PROGRAM

## 2023-01-09 PROCEDURE — 99999 PR PBB SHADOW E&M-EST. PATIENT-LVL V: CPT | Mod: PBBFAC,,, | Performed by: STUDENT IN AN ORGANIZED HEALTH CARE EDUCATION/TRAINING PROGRAM

## 2023-01-09 PROCEDURE — 99213 OFFICE O/P EST LOW 20 MIN: CPT | Mod: S$PBB,,, | Performed by: STUDENT IN AN ORGANIZED HEALTH CARE EDUCATION/TRAINING PROGRAM

## 2023-01-09 PROCEDURE — 82746 ASSAY OF FOLIC ACID SERUM: CPT | Performed by: STUDENT IN AN ORGANIZED HEALTH CARE EDUCATION/TRAINING PROGRAM

## 2023-01-09 PROCEDURE — 36415 COLL VENOUS BLD VENIPUNCTURE: CPT | Performed by: STUDENT IN AN ORGANIZED HEALTH CARE EDUCATION/TRAINING PROGRAM

## 2023-01-09 PROCEDURE — 99213 PR OFFICE/OUTPT VISIT, EST, LEVL III, 20-29 MIN: ICD-10-PCS | Mod: S$PBB,,, | Performed by: STUDENT IN AN ORGANIZED HEALTH CARE EDUCATION/TRAINING PROGRAM

## 2023-01-09 RX ORDER — MULTIVIT WITH MINERALS/HERBS
1 TABLET ORAL DAILY
COMMUNITY
End: 2023-02-22

## 2023-01-09 NOTE — PROGRESS NOTES
[facility] - NEUROLOGY [address]TH [address] [facility], [place]    Date: 1/9/23  Patient Name: Brittany Modi   MRN: [id number]   PCP: Mehdi Tian  Referring Provider: No ref. provider found    Assessment:   Brittany Modi is a 67 y.o. female presenting as follow up for L hand tremors that started May 2022. She had MRI brain done 10/24/22 that showed non specific T2 flair changes but none contributory to patient's symptoms. Today she refers tremors are worse given that they are more bothersome, but these are triggered by emotions. Tremors are not progressive as they fluctuate day to day. On exam tremor is non consistent, varies from finger tapping, to wrist tremor, non rhythmical with varying amplitude and distractible. There is no cogwheel rigidity or bradykinesia, no spasticity, gate with normal base and pull test negative.   Low suspicion of primary movement disorder given clinical exam and presentation and more consistent with functional neurological disorder. This was discussed with patient and  in detail and she was agreeable with assessment and referred understanding. Plan to treat possible underlying psychological component. No indication for parkinson's medications.   Plan:     -- MRI brain personally reviewed and discussed with patient   -- Will refer to Psychology and Psychiatry for therapy for functional neurological disorder   -- Will order TSH, T4, B12 deficiency pane,thiamine, folate- will call back with results   -- Will follow up in 1 year or sooner if symptoms progress or worsen.     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  Any errors in syntax or even information may not have been identified and edited on initial review prior to signing this note.  Subjective:   Patient seen in consultation at the request of No ref. provider found for the evaluation of tremor. A copy of this note will be sent to the referring physician.        Interval history 01/09/2023   Ms Brittany Modi is a 66 y/o R handed F w PMH HTN presenting as follow up for L hand tremor, initially seen on 10/10/22. At that time her tremor was non rhythmic with varying amplitude. No Parkinsonian features noted and there was concern for FND given emotional trigger. She had MRI brain that was unremarkable.   Today she refers, that she is worse. Tremors are more severe, more debilitating. Still only on her left hand but patient it varies degree day to day. She can sometimes control it by holding it or lifting it up. She is also having finger tapping now. She says that the tremors are also influenced by her emotions; she says "if I watch an action movie it gets worse or if I get into an argument."  She refers that she is stressed due to the tremor, but can not think of any underlying stressor that could be provoking it.  She is able to use the hand, like hold a glass.   She denies any vision changes, voice change, dysphagia, dysarthrtia, gate instability, ataxia, weakness.   She takes Vit B, C and D. Estro Ten for hot flashes.         Initial HPI:   Ms. Brittany Modi is a 67 y.o. R handed female w PMH HTN, constipation presenting as initial evaluation for left hand tremor. She was previously seen by Dr. Arreola and Dr. Arciniega (last 5/16/22) for memory problems and tremors, but she wants to change neurologist.   She is having involuntary " movement on left hand, started 5 months, she says that now they are more vigorous and more often than it was, it is daily and constant, its worse at rest and subsided with action or lifting. Sami in May when she saw Dr Arreola they were finer movements and not as debilitating. She refers that if feels like her muscles feel tired and they are stiff. No aching, no pain. Weakness at the fingers and worsens at the end of day, some numbness but no tingling. She can slow it down if she holds it.. Triggers/exacerbated if she is upset/anxious. She refers that she has coordination/balance issues and she has not been able to walk a line for 1 month.   Denies any other medical issues other than HTN and recently constipation. No family history of neurodegenerative/movement disorders. Non smoker, no alcohol use.       PAST MEDICAL HISTORY:  Past Medical History:   Diagnosis Date    Arthritis     Chest pain     GERD (gastroesophageal reflux disease)        PAST SURGICAL HISTORY:  Past Surgical History:   Procedure Laterality Date    ABDOMINOPLASTY      APPENDECTOMY  2010    BILATERAL SALPINGO-OOPHORECTOMY (BSO)  12/05/2019    Surgeon: Geri Azar MD -- [facility]/BSO    COLONOSCOPY N/A 08/22/2019    Procedure: COLONOSCOPY;  Surgeon: Rose Mary Ervin MD;  Location: [address]);  Service: Endoscopy;  Laterality: N/A;  no PM prep    CYSTOSCOPY N/A 12/05/2019    Surgeon: Geri Azar MD -- [facility]    KNEE SURGERY Right 12/2015    TKR    LAPAROSCOPIC TOTAL HYSTERECTOMY N/A 12/05/2019    Surgeon: Geri Azar MD -- [facility]    TUBAL LIGATION      age 30's       CURRENT MEDS:  Current Outpatient Medications   Medication Sig Dispense Refill    b complex vitamins tablet Take 1 tablet by mouth once daily.      cholecalciferol, vitamin D3, 125 mcg (5,000 unit) Tab Take 5,000 Units by mouth once daily.      multivitamin capsule Take 1 capsule by mouth once daily.      fish oil-omega-3 fatty acids 300-1,000 mg capsule  "Take 2 g by mouth once daily.       No current facility-administered medications for this visit.       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      Objective:     Vitals:    01/09/23 1336   BP: (!) 145/80   Pulse: 77   Weight: 80.7 kg (178 lb)   Height: 5' 5" (1.651 m)       General: NAD, well nourished   Eyes: no tearing, discharge, no erythema   ENT: moist mucous membranes of the oral cavity, nares patent    Neck: Supple, full range of motion  Cardiovascular: Warm and well perfused, pulses equal and symmetrical  Lungs: Normal work of breathing, normal chest wall excursions  Skin: No rash, lesions, or breakdown on exposed skin  Psychiatry: Mood and affect are appropriate. During encounter she became distressed and amplitude of tremor increased and included her entire arm. She was aware and verbalized it. Then when she calmed down it improved.   Abdomen: soft, non tender, non distended  Extremeties: No cyanosis, clubbing or edema.    Neurological   MENTAL STATUS: Alert and oriented to person, place, and time. Attention and concentration within normal limits. Speech without dysarthria, able to name and repeat without difficulty. Recent and remote memory within normal limits   CRANIAL " NERVES: Visual fields intact. PERRL. EOMI. Facial sensation intact. Face symmetrical. Hearing grossly intact. Full shoulder shrug bilaterally. Tongue protrudes midline   SENSORY: Sensation is intact to light touch and vibration throughout.  Joint position perception intact. Negative Romberg.   MOTOR: Normal bulk and tone. No pronator drift.  5/5 deltoid, biceps, triceps, interosseous, hand  bilaterally. 5/5 iliopsoas, knee extension/flexion, foot dorsi/plantarflexion bilaterally.   REFLEXES: Symmetric and 2+ throughout. Toes down going bilaterally.   CEREBELLAR/COORDINATION/GAIT: Gait steady with normal arm swing and stride length.  Heel to shin intact. Finger to nose intact. High amplitude tremor that changes from finger tip, to arm to shoulder. Stops when lifting arm up. No intention tremor. No dysmetria with finger taping. Pull test negative. Distractable tremor with finger taping and with strength assessment         05/27/22 MOCA by Dr. Arreola: 23/30  Visuospatial/Executive 4, Naming 3, Attention 4, Language 3, Abstraction 2, Delayed Recall 2, Orientation 5    MRI brain      Nonspecific pattern of scattered T2 FLAIR hyperintensities in the supratentorial white matter differential to include mild moderate chronic ischemic change with migraine headaches to be included in differential in appropriate clinical setting.     Otherwise unremarkable noncontrast MRI brain as detailed above specifically without evidence for acute infarction or age advanced cerebral volume loss.

## 2023-01-09 NOTE — PATIENT INSTRUCTIONS
Referral for Psychology and Psychiatry. 4th floor at [facility].   Please call for appointment.     Will order lab work today and will call you back for results.     Will see you back in 1 year to monitor. If any acute changes please call for sooner appointment.

## 2023-01-11 LAB — VIT B12 SERPL-MCNC: >1400 NG/L (ref 180–914)

## 2023-01-16 LAB — VIT B1 BLD-MCNC: 69 UG/L (ref 38–122)

## 2023-01-27 ENCOUNTER — PATIENT MESSAGE (OUTPATIENT)
Dept: NEUROLOGY | Facility: CLINIC | Age: 68
End: 2023-01-27

## 2023-01-27 ENCOUNTER — PATIENT MESSAGE (OUTPATIENT)
Dept: NEUROLOGY | Facility: HOSPITAL | Age: 68
End: 2023-01-27

## 2023-02-02 ENCOUNTER — LAB VISIT (OUTPATIENT)
Dept: LAB | Facility: HOSPITAL | Age: 68
End: 2023-02-02
Attending: FAMILY MEDICINE
Payer: MEDICARE

## 2023-02-02 ENCOUNTER — OFFICE VISIT (OUTPATIENT)
Dept: INTERNAL MEDICINE | Facility: CLINIC | Age: 68
End: 2023-02-02
Payer: COMMERCIAL

## 2023-02-02 VITALS
DIASTOLIC BLOOD PRESSURE: 76 MMHG | OXYGEN SATURATION: 98 % | HEIGHT: 65 IN | WEIGHT: 180.75 LBS | TEMPERATURE: 97 F | RESPIRATION RATE: 20 BRPM | HEART RATE: 79 BPM | SYSTOLIC BLOOD PRESSURE: 130 MMHG | BODY MASS INDEX: 30.12 KG/M2

## 2023-02-02 DIAGNOSIS — R25.1 TREMOR: ICD-10-CM

## 2023-02-02 DIAGNOSIS — Z00.01 ENCOUNTER FOR GENERAL ADULT MEDICAL EXAMINATION WITH ABNORMAL FINDINGS: Primary | ICD-10-CM

## 2023-02-02 DIAGNOSIS — R41.3 MEMORY CHANGES: ICD-10-CM

## 2023-02-02 DIAGNOSIS — Z12.31 VISIT FOR SCREENING MAMMOGRAM: ICD-10-CM

## 2023-02-02 DIAGNOSIS — Z00.01 ENCOUNTER FOR GENERAL ADULT MEDICAL EXAMINATION WITH ABNORMAL FINDINGS: ICD-10-CM

## 2023-02-02 LAB
25(OH)D3+25(OH)D2 SERPL-MCNC: 47 NG/ML (ref 30–96)
ALBUMIN SERPL BCP-MCNC: 4.5 G/DL (ref 3.5–5.2)
ALP SERPL-CCNC: 64 U/L (ref 55–135)
ALT SERPL W/O P-5'-P-CCNC: 21 U/L (ref 10–44)
ANION GAP SERPL CALC-SCNC: 11 MMOL/L (ref 8–16)
AST SERPL-CCNC: 30 U/L (ref 10–40)
BASOPHILS # BLD AUTO: 0.03 K/UL (ref 0–0.2)
BASOPHILS NFR BLD: 0.6 % (ref 0–1.9)
BILIRUB SERPL-MCNC: 0.5 MG/DL (ref 0.1–1)
BUN SERPL-MCNC: 16 MG/DL (ref 8–23)
CALCIUM SERPL-MCNC: 9.9 MG/DL (ref 8.7–10.5)
CHLORIDE SERPL-SCNC: 104 MMOL/L (ref 95–110)
CHOLEST SERPL-MCNC: 193 MG/DL (ref 120–199)
CHOLEST/HDLC SERPL: 3.3 {RATIO} (ref 2–5)
CO2 SERPL-SCNC: 28 MMOL/L (ref 23–29)
CREAT SERPL-MCNC: 1.1 MG/DL (ref 0.5–1.4)
DIFFERENTIAL METHOD: ABNORMAL
EOSINOPHIL # BLD AUTO: 0 K/UL (ref 0–0.5)
EOSINOPHIL NFR BLD: 0.8 % (ref 0–8)
ERYTHROCYTE [DISTWIDTH] IN BLOOD BY AUTOMATED COUNT: 13.8 % (ref 11.5–14.5)
EST. GFR  (NO RACE VARIABLE): 55.1 ML/MIN/1.73 M^2
GLUCOSE SERPL-MCNC: 90 MG/DL (ref 70–110)
HCT VFR BLD AUTO: 42.5 % (ref 37–48.5)
HDLC SERPL-MCNC: 58 MG/DL (ref 40–75)
HDLC SERPL: 30.1 % (ref 20–50)
HGB BLD-MCNC: 13 G/DL (ref 12–16)
IMM GRANULOCYTES # BLD AUTO: 0.01 K/UL (ref 0–0.04)
IMM GRANULOCYTES NFR BLD AUTO: 0.2 % (ref 0–0.5)
LDLC SERPL CALC-MCNC: 118.8 MG/DL (ref 63–159)
LYMPHOCYTES # BLD AUTO: 1.7 K/UL (ref 1–4.8)
LYMPHOCYTES NFR BLD: 34.7 % (ref 18–48)
MCH RBC QN AUTO: 30.7 PG (ref 27–31)
MCHC RBC AUTO-ENTMCNC: 30.6 G/DL (ref 32–36)
MCV RBC AUTO: 100 FL (ref 82–98)
MONOCYTES # BLD AUTO: 0.4 K/UL (ref 0.3–1)
MONOCYTES NFR BLD: 9.2 % (ref 4–15)
NEUTROPHILS # BLD AUTO: 2.6 K/UL (ref 1.8–7.7)
NEUTROPHILS NFR BLD: 54.5 % (ref 38–73)
NONHDLC SERPL-MCNC: 135 MG/DL
NRBC BLD-RTO: 0 /100 WBC
PLATELET # BLD AUTO: 276 K/UL (ref 150–450)
PMV BLD AUTO: 12.5 FL (ref 9.2–12.9)
POTASSIUM SERPL-SCNC: 4 MMOL/L (ref 3.5–5.1)
PROT SERPL-MCNC: 8.3 G/DL (ref 6–8.4)
RBC # BLD AUTO: 4.24 M/UL (ref 4–5.4)
SODIUM SERPL-SCNC: 143 MMOL/L (ref 136–145)
TRIGL SERPL-MCNC: 81 MG/DL (ref 30–150)
WBC # BLD AUTO: 4.78 K/UL (ref 3.9–12.7)

## 2023-02-02 PROCEDURE — 1159F MED LIST DOCD IN RCRD: CPT | Mod: CPTII,S$GLB,, | Performed by: FAMILY MEDICINE

## 2023-02-02 PROCEDURE — 85025 COMPLETE CBC W/AUTO DIFF WBC: CPT | Performed by: FAMILY MEDICINE

## 2023-02-02 PROCEDURE — 1101F PT FALLS ASSESS-DOCD LE1/YR: CPT | Mod: CPTII,S$GLB,, | Performed by: FAMILY MEDICINE

## 2023-02-02 PROCEDURE — 1160F PR REVIEW ALL MEDS BY PRESCRIBER/CLIN PHARMACIST DOCUMENTED: ICD-10-PCS | Mod: CPTII,S$GLB,, | Performed by: FAMILY MEDICINE

## 2023-02-02 PROCEDURE — 36415 COLL VENOUS BLD VENIPUNCTURE: CPT | Mod: PO | Performed by: FAMILY MEDICINE

## 2023-02-02 PROCEDURE — 3075F PR MOST RECENT SYSTOLIC BLOOD PRESS GE 130-139MM HG: ICD-10-PCS | Mod: CPTII,S$GLB,, | Performed by: FAMILY MEDICINE

## 2023-02-02 PROCEDURE — 1159F PR MEDICATION LIST DOCUMENTED IN MEDICAL RECORD: ICD-10-PCS | Mod: CPTII,S$GLB,, | Performed by: FAMILY MEDICINE

## 2023-02-02 PROCEDURE — 3288F FALL RISK ASSESSMENT DOCD: CPT | Mod: CPTII,S$GLB,, | Performed by: FAMILY MEDICINE

## 2023-02-02 PROCEDURE — 3288F PR FALLS RISK ASSESSMENT DOCUMENTED: ICD-10-PCS | Mod: CPTII,S$GLB,, | Performed by: FAMILY MEDICINE

## 2023-02-02 PROCEDURE — 3008F PR BODY MASS INDEX (BMI) DOCUMENTED: ICD-10-PCS | Mod: CPTII,S$GLB,, | Performed by: FAMILY MEDICINE

## 2023-02-02 PROCEDURE — 1157F ADVNC CARE PLAN IN RCRD: CPT | Mod: CPTII,S$GLB,, | Performed by: FAMILY MEDICINE

## 2023-02-02 PROCEDURE — 3078F DIAST BP <80 MM HG: CPT | Mod: CPTII,S$GLB,, | Performed by: FAMILY MEDICINE

## 2023-02-02 PROCEDURE — 1101F PR PT FALLS ASSESS DOC 0-1 FALLS W/OUT INJ PAST YR: ICD-10-PCS | Mod: CPTII,S$GLB,, | Performed by: FAMILY MEDICINE

## 2023-02-02 PROCEDURE — 1160F RVW MEDS BY RX/DR IN RCRD: CPT | Mod: CPTII,S$GLB,, | Performed by: FAMILY MEDICINE

## 2023-02-02 PROCEDURE — 1126F PR PAIN SEVERITY QUANTIFIED, NO PAIN PRESENT: ICD-10-PCS | Mod: CPTII,S$GLB,, | Performed by: FAMILY MEDICINE

## 2023-02-02 PROCEDURE — 3078F PR MOST RECENT DIASTOLIC BLOOD PRESSURE < 80 MM HG: ICD-10-PCS | Mod: CPTII,S$GLB,, | Performed by: FAMILY MEDICINE

## 2023-02-02 PROCEDURE — 82306 VITAMIN D 25 HYDROXY: CPT | Performed by: FAMILY MEDICINE

## 2023-02-02 PROCEDURE — 80053 COMPREHEN METABOLIC PANEL: CPT | Performed by: FAMILY MEDICINE

## 2023-02-02 PROCEDURE — 99397 PER PM REEVAL EST PAT 65+ YR: CPT | Mod: S$GLB,,, | Performed by: FAMILY MEDICINE

## 2023-02-02 PROCEDURE — 1126F AMNT PAIN NOTED NONE PRSNT: CPT | Mod: CPTII,S$GLB,, | Performed by: FAMILY MEDICINE

## 2023-02-02 PROCEDURE — 99999 PR PBB SHADOW E&M-EST. PATIENT-LVL IV: CPT | Mod: PBBFAC,,, | Performed by: FAMILY MEDICINE

## 2023-02-02 PROCEDURE — 1157F PR ADVANCE CARE PLAN OR EQUIV PRESENT IN MEDICAL RECORD: ICD-10-PCS | Mod: CPTII,S$GLB,, | Performed by: FAMILY MEDICINE

## 2023-02-02 PROCEDURE — 3008F BODY MASS INDEX DOCD: CPT | Mod: CPTII,S$GLB,, | Performed by: FAMILY MEDICINE

## 2023-02-02 PROCEDURE — 99999 PR PBB SHADOW E&M-EST. PATIENT-LVL IV: ICD-10-PCS | Mod: PBBFAC,,, | Performed by: FAMILY MEDICINE

## 2023-02-02 PROCEDURE — 99397 PR PREVENTIVE VISIT,EST,65 & OVER: ICD-10-PCS | Mod: S$GLB,,, | Performed by: FAMILY MEDICINE

## 2023-02-02 PROCEDURE — 3075F SYST BP GE 130 - 139MM HG: CPT | Mod: CPTII,S$GLB,, | Performed by: FAMILY MEDICINE

## 2023-02-02 PROCEDURE — 80061 LIPID PANEL: CPT | Performed by: FAMILY MEDICINE

## 2023-02-02 RX ORDER — PROPRANOLOL HYDROCHLORIDE 10 MG/1
10 TABLET ORAL 3 TIMES DAILY PRN
Qty: 90 TABLET | Refills: 11 | Status: SHIPPED | OUTPATIENT
Start: 2023-02-02 | End: 2023-02-22

## 2023-02-02 NOTE — PROGRESS NOTES
Subjective:       Patient ID: Brittany Modi is a 67 y.o. female.    Chief Complaint: Annual Exam    HPI 67-year-old  female patient of Dr. Tian presents to clinic today for annual physical exam but also secondary to concerns of a tremor.  She reports no significant past medical history.  She reports a past surgical history of appendectomy, tubal ligation, abdominoplasty, total abdominal hysterectomy, cystoscopy, and right total knee replacement.  She reports a strong family history of hypertension.  Her mother had hypertension and hypothyroidism.  Her sister had breast cancer.  Finally, the patient has previously been seen by neurology secondary to a left hand tremor.  Review of neurology note reports that the tremor started in May 2022.  Workup including MRI of the brain and labs returned showing no contributory factors the patient's symptoms.  The patient reports that the tremor is constant.  Neurology notes that the tremor is non consistent and feels that there is an emotional component related to the tremor.  The patient has been referred to Psychology for further evaluation but has been unable to obtain a visit.  Review of Systems   Constitutional:  Positive for activity change. Negative for appetite change, chills, fatigue, fever and unexpected weight change.   HENT:  Negative for nasal congestion, ear pain, hearing loss, postnasal drip, rhinorrhea, sinus pressure/congestion, sore throat, tinnitus and trouble swallowing.    Eyes:  Negative for discharge, redness, itching and visual disturbance.   Respiratory:  Negative for cough, chest tightness, shortness of breath and wheezing.    Cardiovascular:  Negative for chest pain and palpitations.   Gastrointestinal:  Positive for constipation. Negative for abdominal pain, blood in stool, diarrhea, nausea and vomiting.   Endocrine: Positive for heat intolerance and polyuria. Negative for polydipsia.   Genitourinary:  Negative for decreased  urine volume, difficulty urinating, dysuria, frequency, hematuria, menstrual problem and urgency.   Musculoskeletal:  Positive for neck pain. Negative for arthralgias, back pain, joint swelling, myalgias and neck stiffness.   Integumentary:  Negative for rash.   Neurological:  Positive for tremors. Negative for dizziness, weakness, light-headedness and headaches.   Psychiatric/Behavioral: Negative.  Negative for confusion and dysphoric mood.        Objective:      Physical Exam  Vitals and nursing note reviewed.   Constitutional:       General: She is not in acute distress.     Appearance: She is well-developed. She is not diaphoretic.   HENT:      Head: Normocephalic and atraumatic.      Right Ear: External ear normal.      Left Ear: External ear normal.      Nose: Nose normal.      Mouth/Throat:      Pharynx: No oropharyngeal exudate.   Eyes:      General: No scleral icterus.        Right eye: No discharge.         Left eye: No discharge.      Conjunctiva/sclera: Conjunctivae normal.      Pupils: Pupils are equal, round, and reactive to light.   Neck:      Thyroid: No thyromegaly.      Vascular: No JVD.      Trachea: No tracheal deviation.   Cardiovascular:      Rate and Rhythm: Normal rate and regular rhythm.      Heart sounds: Normal heart sounds. No murmur heard.    No friction rub. No gallop.   Pulmonary:      Effort: Pulmonary effort is normal. No respiratory distress.      Breath sounds: Normal breath sounds. No stridor. No wheezing or rales.   Abdominal:      General: Bowel sounds are normal. There is no distension.      Palpations: Abdomen is soft. There is no mass.      Tenderness: There is no abdominal tenderness. There is no guarding or rebound.   Musculoskeletal:         General: No tenderness. Normal range of motion.      Cervical back: Normal range of motion and neck supple.   Lymphadenopathy:      Cervical: No cervical adenopathy.   Skin:     General: Skin is warm and dry.      Coloration: Skin is  not pale.      Findings: No erythema or rash.   Neurological:      Mental Status: She is alert and oriented to person, place, and time.      Motor: Tremor (left hand at rest. No tremor with activity) present.   Psychiatric:         Behavior: Behavior normal.         Thought Content: Thought content normal.         Judgment: Judgment normal.       Assessment:       Problem List Items Addressed This Visit       Memory changes    Relevant Orders    CBC Auto Differential    Comprehensive Metabolic Panel    Lipid Panel    Urinalysis    Vitamin D    Tremor    Relevant Medications    propranoloL (INDERAL) 10 MG tablet    Other Relevant Orders    CBC Auto Differential    Comprehensive Metabolic Panel    Lipid Panel    Urinalysis    Vitamin D     Other Visit Diagnoses       Encounter for general adult medical examination with abnormal findings    -  Primary    Relevant Orders    CBC Auto Differential    Comprehensive Metabolic Panel    Lipid Panel    Urinalysis    Vitamin D            Plan:         1. CBC, CMP, fasting lipid panel, urinalysis, and vitamin-D level.    2. Labs have been reviewed that were performed by neurology and only reveal an elevated vitamin B12 level.  I have recommended that the patient stop taking her vitamin B12 supplements.  3. Start propranolol 10 mg t.i.d. p.r.n. tremor.    4. Screening mammogram.    5. Return to clinic as needed or in 1 year for annual physical exam.

## 2023-02-16 ENCOUNTER — PATIENT MESSAGE (OUTPATIENT)
Dept: NEUROLOGY | Facility: CLINIC | Age: 68
End: 2023-02-16
Payer: MEDICARE

## 2023-02-16 ENCOUNTER — TELEPHONE (OUTPATIENT)
Dept: INTERNAL MEDICINE | Facility: CLINIC | Age: 68
End: 2023-02-16
Payer: MEDICARE

## 2023-02-16 DIAGNOSIS — R25.1 TREMOR: Primary | ICD-10-CM

## 2023-02-16 NOTE — TELEPHONE ENCOUNTER
----- Message from Shannon Ireland MA sent at 2/16/2023  2:07 PM CST -----  Contact: Patient    ----- Message -----  From: Tiffanie Wheeler  Sent: 2/16/2023  10:31 AM CST  To: Taras Sanon Staff    Type:  Patient Call          Who Called: Patient         Does the patient know what this is regarding?: Requesting a call back to have a referral for neurology to visit a outside provider ; please advise           Would the patient rather a call back or a response via [facility]? Call           Best Call Back Number: [phone]             Additional Information: [facility] pt didn't have the fax number / Pt would also like to have a paper copy of the referral

## 2023-02-17 ENCOUNTER — HOSPITAL ENCOUNTER (OUTPATIENT)
Dept: RADIOLOGY | Facility: HOSPITAL | Age: 68
Discharge: HOME | End: 2023-02-17
Attending: FAMILY MEDICINE
Payer: MEDICARE

## 2023-02-17 VITALS — WEIGHT: 180 LBS | HEIGHT: 65 IN | BODY MASS INDEX: 29.99 KG/M2

## 2023-02-17 DIAGNOSIS — Z12.31 VISIT FOR SCREENING MAMMOGRAM: ICD-10-CM

## 2023-02-17 PROCEDURE — 77063 BREAST TOMOSYNTHESIS BI: CPT | Mod: 26,,, | Performed by: RADIOLOGY

## 2023-02-17 PROCEDURE — 77067 SCR MAMMO BI INCL CAD: CPT | Mod: 26,,, | Performed by: RADIOLOGY

## 2023-02-17 PROCEDURE — 77067 SCR MAMMO BI INCL CAD: CPT | Mod: TC,PO

## 2023-02-17 PROCEDURE — 77067 MAMMO DIGITAL SCREENING BILAT WITH TOMO: ICD-10-PCS | Mod: 26,,, | Performed by: RADIOLOGY

## 2023-02-17 PROCEDURE — 77063 MAMMO DIGITAL SCREENING BILAT WITH TOMO: ICD-10-PCS | Mod: 26,,, | Performed by: RADIOLOGY

## 2023-02-22 ENCOUNTER — OFFICE VISIT (OUTPATIENT)
Dept: NEUROLOGY | Facility: CLINIC | Age: 68
End: 2023-02-22
Payer: MEDICARE

## 2023-02-22 VITALS
BODY MASS INDEX: 30.66 KG/M2 | DIASTOLIC BLOOD PRESSURE: 71 MMHG | HEART RATE: 76 BPM | SYSTOLIC BLOOD PRESSURE: 180 MMHG | HEIGHT: 65 IN | WEIGHT: 184 LBS

## 2023-02-22 DIAGNOSIS — R25.1 TREMOR: ICD-10-CM

## 2023-02-22 DIAGNOSIS — G20.C PARKINSONISM, UNSPECIFIED PARKINSONISM TYPE: Primary | ICD-10-CM

## 2023-02-22 DIAGNOSIS — Z71.89 COUNSELING REGARDING GOALS OF CARE: ICD-10-CM

## 2023-02-22 PROCEDURE — 3288F PR FALLS RISK ASSESSMENT DOCUMENTED: ICD-10-PCS | Mod: CPTII,S$GLB,, | Performed by: PHYSICIAN ASSISTANT

## 2023-02-22 PROCEDURE — 3077F PR MOST RECENT SYSTOLIC BLOOD PRESSURE >= 140 MM HG: ICD-10-PCS | Mod: CPTII,S$GLB,, | Performed by: PHYSICIAN ASSISTANT

## 2023-02-22 PROCEDURE — 3288F FALL RISK ASSESSMENT DOCD: CPT | Mod: CPTII,S$GLB,, | Performed by: PHYSICIAN ASSISTANT

## 2023-02-22 PROCEDURE — 1101F PR PT FALLS ASSESS DOC 0-1 FALLS W/OUT INJ PAST YR: ICD-10-PCS | Mod: CPTII,S$GLB,, | Performed by: PHYSICIAN ASSISTANT

## 2023-02-22 PROCEDURE — 1159F PR MEDICATION LIST DOCUMENTED IN MEDICAL RECORD: ICD-10-PCS | Mod: CPTII,S$GLB,, | Performed by: PHYSICIAN ASSISTANT

## 2023-02-22 PROCEDURE — 99999 PR PBB SHADOW E&M-EST. PATIENT-LVL III: CPT | Mod: PBBFAC,,, | Performed by: PHYSICIAN ASSISTANT

## 2023-02-22 PROCEDURE — 1159F MED LIST DOCD IN RCRD: CPT | Mod: CPTII,S$GLB,, | Performed by: PHYSICIAN ASSISTANT

## 2023-02-22 PROCEDURE — 99215 OFFICE O/P EST HI 40 MIN: CPT | Mod: S$GLB,,, | Performed by: PHYSICIAN ASSISTANT

## 2023-02-22 PROCEDURE — 3008F PR BODY MASS INDEX (BMI) DOCUMENTED: ICD-10-PCS | Mod: CPTII,S$GLB,, | Performed by: PHYSICIAN ASSISTANT

## 2023-02-22 PROCEDURE — 3078F DIAST BP <80 MM HG: CPT | Mod: CPTII,S$GLB,, | Performed by: PHYSICIAN ASSISTANT

## 2023-02-22 PROCEDURE — 1126F AMNT PAIN NOTED NONE PRSNT: CPT | Mod: CPTII,S$GLB,, | Performed by: PHYSICIAN ASSISTANT

## 2023-02-22 PROCEDURE — 3008F BODY MASS INDEX DOCD: CPT | Mod: CPTII,S$GLB,, | Performed by: PHYSICIAN ASSISTANT

## 2023-02-22 PROCEDURE — 3077F SYST BP >= 140 MM HG: CPT | Mod: CPTII,S$GLB,, | Performed by: PHYSICIAN ASSISTANT

## 2023-02-22 PROCEDURE — 1126F PR PAIN SEVERITY QUANTIFIED, NO PAIN PRESENT: ICD-10-PCS | Mod: CPTII,S$GLB,, | Performed by: PHYSICIAN ASSISTANT

## 2023-02-22 PROCEDURE — 3078F PR MOST RECENT DIASTOLIC BLOOD PRESSURE < 80 MM HG: ICD-10-PCS | Mod: CPTII,S$GLB,, | Performed by: PHYSICIAN ASSISTANT

## 2023-02-22 PROCEDURE — 1101F PT FALLS ASSESS-DOCD LE1/YR: CPT | Mod: CPTII,S$GLB,, | Performed by: PHYSICIAN ASSISTANT

## 2023-02-22 PROCEDURE — 1157F PR ADVANCE CARE PLAN OR EQUIV PRESENT IN MEDICAL RECORD: ICD-10-PCS | Mod: CPTII,S$GLB,, | Performed by: PHYSICIAN ASSISTANT

## 2023-02-22 PROCEDURE — 99215 PR OFFICE/OUTPT VISIT, EST, LEVL V, 40-54 MIN: ICD-10-PCS | Mod: S$GLB,,, | Performed by: PHYSICIAN ASSISTANT

## 2023-02-22 PROCEDURE — 1160F PR REVIEW ALL MEDS BY PRESCRIBER/CLIN PHARMACIST DOCUMENTED: ICD-10-PCS | Mod: CPTII,S$GLB,, | Performed by: PHYSICIAN ASSISTANT

## 2023-02-22 PROCEDURE — 99999 PR PBB SHADOW E&M-EST. PATIENT-LVL III: ICD-10-PCS | Mod: PBBFAC,,, | Performed by: PHYSICIAN ASSISTANT

## 2023-02-22 PROCEDURE — 1157F ADVNC CARE PLAN IN RCRD: CPT | Mod: CPTII,S$GLB,, | Performed by: PHYSICIAN ASSISTANT

## 2023-02-22 PROCEDURE — 1160F RVW MEDS BY RX/DR IN RCRD: CPT | Mod: CPTII,S$GLB,, | Performed by: PHYSICIAN ASSISTANT

## 2023-02-22 RX ORDER — CARBIDOPA AND LEVODOPA 25; 100 MG/1; MG/1
TABLET ORAL
Qty: 101 TABLET | Refills: 11 | Status: SHIPPED | OUTPATIENT
Start: 2023-02-22 | End: 2023-03-31

## 2023-02-22 NOTE — PROGRESS NOTES
Name: Brittany Modi  MRN: [id number]   CSN: [id number]      Date: 02/22/2023    Referring physician:  No referring provider defined for this encounter.    Chief Complaint: tremor     History of Present Illness (HPI): Brittany Modi is a R handed 67 y.o. female with a medical issues significant for GERD who presents for tremors. Referral from Dr. Dulce Elder and Dr. Edvin Love. First noticed tremor in May 2022. Gradual onset. First started off as a fine tremor, at times she didn't notice it. Other people would point it out. She notes that years ago (at least 10 years) she had a head tremor and was diagnosed with dystonia and has since resolved. Tremor started in left hand, only in the left hand. No tremor in the R hand. Denies tremor elsewhere. Notices tremor with rest, action and posture. Denies imbalance or falls. She does note that she has slowed down over the past year. She does not feel that she can suppress the tremor, although at times she has been able to stop tremor by grabbing her left hand with her right hand. Sometimes will raise her L arm above her head and it will momentarily stop but then it will re-emerge. She feels like something is around her neck, uncomfortable. Tremor is exacerbated by emotion and stress.     Does not exercise.     She tried inderal in the past but was not beneficial for this tremor.     Does not consume EtOH.       Retired RN.     Family History: none     Neuroleptic Exposure: none       From Jan 2023 with Dr. lEder  Interval history 01/09/2023   Ms Brittany Modi is a 68 y/o R handed F w PMH HTN presenting as follow up for L hand tremor, initially seen on 10/10/22. At that time her tremor was non rhythmic with varying amplitude. No Parkinsonian features noted and there was concern for FND given emotional trigger. She had MRI brain that was unremarkable.   Today she refers, that she is worse. Tremors are more severe, more debilitating. Still only on her left hand but patient it  "varies degree day to day. She can sometimes control it by holding it or lifting it up. She is also having finger tapping now. She says that the tremors are also influenced by her emotions; she says "if I watch an action movie it gets worse or if I get into an argument."  She refers that she is stressed due to the tremor, but can not think of any underlying stressor that could be provoking it.  She is able to use the hand, like hold a glass.   She denies any vision changes, voice change, dysphagia, dysarthrtia, gate instability, ataxia, weakness.   She takes Vit B, C and D. Estro Ten for hot flashes.            Initial HPI:   Ms. Brittany Modi is a 67 y.o. R handed female w PMH HTN, constipation presenting as initial evaluation for left hand tremor. She was previously seen by Dr. Arreola and Dr. Arciniega (last 5/16/22) for memory problems and tremors, but she wants to change neurologist.   She is having involuntary movement on left hand, started 5 months, she says that now they are more vigorous and more often than it was, it is daily and constant, its worse at rest and subsided with action or lifting. Sami in May when she saw Dr Arreola they were finer movements and not as debilitating. She refers that if feels like her muscles feel tired and they are stiff. No aching, no pain. Weakness at the fingers and worsens at the end of day, some numbness but no tingling. She can slow it down if she holds it.. Triggers/exacerbated if she is upset/anxious. She refers that she has coordination/balance issues and she has not been able to walk a line for 1 month.   Denies any other medical issues other than HTN and recently constipation. No family history of neurodegenerative/movement disorders. Non smoker, no alcohol use    Nonmotor/Premotor ROS:  Anosmia: poor sense of smell   Dysarthria/Hypophonia: none   Dysphagia/Sialorrhea: none   Depression: yes, apathy   Cognitive slowing: memory is declining, speaking and forgets what she " wants to say -- trouble with word finding   Hallucinations: none   Urinary changes: frequency at night   Constipation: chronic   Falls: none   Micrographia: none   Sleep issues:  -RBD: she has been told she talks in her sleep       Review of Systems:   Review of Systems   Constitutional:  Negative for chills, fever and malaise/fatigue.   HENT:  Negative for hearing loss.    Eyes:  Negative for blurred vision and double vision.   Respiratory:  Negative for cough, shortness of breath and stridor.    Cardiovascular:  Negative for chest pain and leg swelling.   Gastrointestinal:  Positive for constipation. Negative for diarrhea and nausea.   Genitourinary:  Positive for frequency. Negative for urgency.   Musculoskeletal:  Negative for falls.   Skin:  Negative for itching and rash.   Neurological:  Positive for tremors. Negative for dizziness, loss of consciousness and weakness.   Psychiatric/Behavioral:  Negative for hallucinations and memory loss.          Past Medical History: The patient  has a past medical history of Arthritis, Chest pain, and GERD (gastroesophageal reflux disease).    Social History: The patient  reports that she has never smoked. She has never used smokeless tobacco. She reports that she does not drink alcohol and does not use drugs.    Family History: Their family history includes Asthma in her father; Breast cancer in her sister; Cancer in her sister; Diabetes in her mother; Hypertension in her brother, brother, mother, sister, sister, sister, and son; Hypothyroidism in her mother.    Allergies: Patient has no known allergies.     Meds:   Current Outpatient Medications on File Prior to Visit   Medication Sig Dispense Refill    cholecalciferol, vitamin D3, 125 mcg (5,000 unit) Tab Take 5,000 Units by mouth once daily.      multivitamin capsule Take 1 capsule by mouth once daily.      [DISCONTINUED] propranoloL (INDERAL) 10 MG tablet Take 1 tablet (10 mg total) by mouth 3 (three) times daily as  "needed (tremor). 90 tablet 11    [DISCONTINUED] b complex vitamins tablet Take 1 tablet by mouth once daily.       No current facility-administered medications on file prior to visit.       Exam:  BP (!) 180/71 (BP Location: Right arm, Patient Position: Sitting, BP Method: Medium (Automatic))   Pulse 76   Ht 5' 5" (1.651 m)   Wt 83.4 kg (183 lb 15.6 oz)   BMI 30.61 kg/m²     Constitutional  Well-developed, well-nourished, appears stated age   Ophthalmoscopic  No papilledema with no hemorrhages or exudates bilaterally   Cardiovascular  Radial pulses 2+ and symmetric, no LE edema bilaterally   Neurological    * Mental status  MOCA =      - Orientation  Oriented to person, place, time, and situation     - Memory   Intact recent and remote     - Attention/concentration  Attentive, vigilant during exam     - Language  Naming & repetition intact, +2-step commands     - Fund of knowledge  Aware of current events     - Executive  Well-organized thoughts     - Other     * Cranial nerves       - CN II  PERRL, visual fields full to confrontation     - CN III, IV, VI  Extraocular movements full, normal pursuits and saccades     - CN V  Sensation V1 - V3 intact     - CN VII  Face strong and symmetric bilaterally     - CN VIII  Hearing intact bilaterally     - CN IX, X  Palate raises midline and symmetric     - CN XI  SCM and trapezius 5/5 bilaterally     - CN XII  Tongue midline   * Motor  Muscle bulk normal, strength 5/5 throughout   * Sensory   Intact to light touch    * Coordination  No dysmetria with finger-to-nose or heel-to-shin   * Gait  See below.   * Deep tendon reflexes  3+ and symmetric throughout   Babinski downgoing bilaterally   * Specialized movement exam  No hypophonic speech.    No facial masking.   Mild L > R (only in the wrist on the R) cogwheel rigidity.     L bradykinesia with finger taps, finger flicks,    Resting tremor of L hand -- not distractible, persists with mental tasks  -- she is able to " suppress the tremor at times however there is no variability in frequency or amplitude, there is no axis change    Re-emerges with posture and gait eval    No other chorea, athetosis, myoclonus, or tics.   No motor impersistence.   Normal-based gait.   No shortened stride length.   Decreased L arm swing    No postural instability.     Subtle head tremor, no-no -- dystonic      Laboratory/Radiological:  - Results:  Lab Visit on 02/02/2023   Component Date Value Ref Range Status    WBC 02/02/2023 4.78  3.90 - 12.70 K/uL Final    RBC 02/02/2023 4.24  4.00 - 5.40 M/uL Final    Hemoglobin 02/02/2023 13.0  12.0 - 16.0 g/dL Final    Hematocrit 02/02/2023 42.5  37.0 - 48.5 % Final    MCV 02/02/2023 100 (H)  82 - 98 fL Final    MCH 02/02/2023 30.7  27.0 - 31.0 pg Final    MCHC 02/02/2023 30.6 (L)  32.0 - 36.0 g/dL Final    RDW 02/02/2023 13.8  11.5 - 14.5 % Final    Platelets 02/02/2023 276  150 - 450 K/uL Final    MPV 02/02/2023 12.5  9.2 - 12.9 fL Final    Immature Granulocytes 02/02/2023 0.2  0.0 - 0.5 % Final    Gran # (ANC) 02/02/2023 2.6  1.8 - 7.7 K/uL Final    Immature Grans (Abs) 02/02/2023 0.01  0.00 - 0.04 K/uL Final    Lymph # 02/02/2023 1.7  1.0 - 4.8 K/uL Final    Mono # 02/02/2023 0.4  0.3 - 1.0 K/uL Final    Eos # 02/02/2023 0.0  0.0 - 0.5 K/uL Final    Baso # 02/02/2023 0.03  0.00 - 0.20 K/uL Final    nRBC 02/02/2023 0  0 /100 WBC Final    Gran % 02/02/2023 54.5  38.0 - 73.0 % Final    Lymph % 02/02/2023 34.7  18.0 - 48.0 % Final    Mono % 02/02/2023 9.2  4.0 - 15.0 % Final    Eosinophil % 02/02/2023 0.8  0.0 - 8.0 % Final    Basophil % 02/02/2023 0.6  0.0 - 1.9 % Final    Differential Method 02/02/2023 Automated   Final    Sodium 02/02/2023 143  136 - 145 mmol/L Final    Potassium 02/02/2023 4.0  3.5 - 5.1 mmol/L Final    Chloride 02/02/2023 104  95 - 110 mmol/L Final    CO2 02/02/2023 28  23 - 29 mmol/L Final    Glucose 02/02/2023 90  70 - 110 mg/dL Final    BUN 02/02/2023 16  8 - 23 mg/dL Final     Creatinine 02/02/2023 1.1  0.5 - 1.4 mg/dL Final    Calcium 02/02/2023 9.9  8.7 - 10.5 mg/dL Final    Total Protein 02/02/2023 8.3  6.0 - 8.4 g/dL Final    Albumin 02/02/2023 4.5  3.5 - 5.2 g/dL Final    Total Bilirubin 02/02/2023 0.5  0.1 - 1.0 mg/dL Final    Alkaline Phosphatase 02/02/2023 64  55 - 135 U/L Final    AST 02/02/2023 30  10 - 40 U/L Final    ALT 02/02/2023 21  10 - 44 U/L Final    Anion Gap 02/02/2023 11  8 - 16 mmol/L Final    eGFR 02/02/2023 55.1 (A)  >60 mL/min/1.73 m^2 Final    Cholesterol 02/02/2023 193  120 - 199 mg/dL Final    Triglycerides 02/02/2023 81  30 - 150 mg/dL Final    HDL 02/02/2023 58  40 - 75 mg/dL Final    LDL Cholesterol 02/02/2023 118.8  63.0 - 159.0 mg/dL Final    HDL/Cholesterol Ratio 02/02/2023 30.1  20.0 - 50.0 % Final    Total Cholesterol/HDL Ratio 02/02/2023 3.3  2.0 - 5.0 Final    Non-HDL Cholesterol 02/02/2023 135  mg/dL Final    Vit D, 25-Hydroxy 02/02/2023 47  30 - 96 ng/mL Final   Lab Visit on 02/02/2023   Component Date Value Ref Range Status    Specimen UA 02/02/2023 Urine, Clean Catch   Final    Color, UA 02/02/2023 Yellow  Yellow, Straw, Jyoti Final    Appearance, UA 02/02/2023 Clear  Clear Final    pH, UA 02/02/2023 5.0  5.0 - 8.0 Final    Specific Gravity, UA 02/02/2023 1.020  1.005 - 1.030 Final    Protein, UA 02/02/2023 Negative  Negative Final    Glucose, UA 02/02/2023 Negative  Negative Final    Ketones, UA 02/02/2023 Negative  Negative Final    Bilirubin (UA) 02/02/2023 Negative  Negative Final    Occult Blood UA 02/02/2023 Negative  Negative Final    Nitrite, UA 02/02/2023 Negative  Negative Final    Leukocytes, UA 02/02/2023 Negative  Negative Final    RBC, UA 02/02/2023 2  0 - 4 /hpf Final    WBC, UA 02/02/2023 1  0 - 5 /hpf Final    Squam Epithel, UA 02/02/2023 2  /hpf Final    Microscopic Comment 02/02/2023 SEE COMMENT   Final   Lab Visit on 01/09/2023   Component Date Value Ref Range Status    TSH 01/09/2023 1.374  0.400 - 4.000 uIU/mL Final    Free  T4 01/09/2023 0.92  0.71 - 1.51 ng/dL Final    Thiamine 01/09/2023 69  38 - 122 ug/L Final    Vitamin B-12 01/09/2023 >1400 (H)  180 - 914 ng/L Final    Folate 01/09/2023 17.7  4.0 - 24.0 ng/mL Final       - Independent review of images:      - Independent review of consultant's notes: Jael Love     ASSESSMENT/PLAN:  Parkinson's disease  - tremor-dominant, L sided, L > R cogwheel rigidity, L bradykinesia   - non motor features of alpha-synucleinopathy present -- anosmia, constipation, possible RBD  - ldopa trial, avoid amantadine in setting of cognitive changes   - increase physical activity   - takes propranolol prn -- okay to stop       Orders Placed This Encounter    carbidopa-levodopa  mg (SINEMET)  mg per tablet           Follow up: in 3 months with [facility]     Collaborating Physician, Dr. Garvin, was available during today's encounter. Any change to plan along with cosign to appear in the EMR.       Total time spent with the patient: 50 minutes.  38 minutes of face-to-face consultation and 12 minutes of chart review and coordination of care, on the day of the visit. This includes face to face time and non-face to face time preparing to see the patient (eg, review of tests), obtaining and/or reviewing separately obtained history, documenting clinical information in the electronic or other health record, independently interpreting resultsand communicating results to the patient/family/caregiver, or care coordination.         Mercedes Hameed PA-C   [facility]  Department of Neurology  Movement Disorders

## 2023-04-14 ENCOUNTER — TELEPHONE (OUTPATIENT)
Dept: NEUROLOGY | Facility: CLINIC | Age: 68
End: 2023-04-14
Payer: MEDICARE

## 2023-04-14 NOTE — TELEPHONE ENCOUNTER
----- Message from Mercedes Hameed PA-C sent at 4/13/2023 12:34 PM CDT -----  Contact: [phone]    ----- Message -----  From: Marilyn Fournier MA  Sent: 4/13/2023  11:52 AM CDT  To: [facility] Staff    Pt requesting to speak with Mercedes Hameed regarding side effects to medication Levodopa. Please reach out to pt at [phone]

## 2023-04-14 NOTE — TELEPHONE ENCOUNTER
Advised pt. Ok per Mercedes to go back to cd/ld 1/2 tab TID. And please check in with us for an update next week.

## 2023-04-14 NOTE — TELEPHONE ENCOUNTER
Returned pts call.   Pt says she is very tired and sleepy after each dose of cd/ld and gets a tightness in throat, sweating a lot.   But, does helps tremors very well.    Did okay on 1/2 tab TID-for first week- less side effects and still helpful for tremor.

## 2023-05-22 ENCOUNTER — OFFICE VISIT (OUTPATIENT)
Dept: NEUROLOGY | Facility: CLINIC | Age: 68
End: 2023-05-22
Payer: MEDICARE

## 2023-05-22 VITALS
WEIGHT: 180 LBS | HEART RATE: 79 BPM | DIASTOLIC BLOOD PRESSURE: 76 MMHG | SYSTOLIC BLOOD PRESSURE: 156 MMHG | HEIGHT: 65 IN | BODY MASS INDEX: 29.99 KG/M2

## 2023-05-22 DIAGNOSIS — R25.1 TREMOR: ICD-10-CM

## 2023-05-22 DIAGNOSIS — G20.C PARKINSONISM, UNSPECIFIED PARKINSONISM TYPE: Primary | ICD-10-CM

## 2023-05-22 DIAGNOSIS — Z71.89 COUNSELING REGARDING GOALS OF CARE: ICD-10-CM

## 2023-05-22 PROCEDURE — 3008F PR BODY MASS INDEX (BMI) DOCUMENTED: ICD-10-PCS | Mod: CPTII,S$GLB,, | Performed by: PHYSICIAN ASSISTANT

## 2023-05-22 PROCEDURE — 3288F PR FALLS RISK ASSESSMENT DOCUMENTED: ICD-10-PCS | Mod: CPTII,S$GLB,, | Performed by: PHYSICIAN ASSISTANT

## 2023-05-22 PROCEDURE — 1160F RVW MEDS BY RX/DR IN RCRD: CPT | Mod: CPTII,S$GLB,, | Performed by: PHYSICIAN ASSISTANT

## 2023-05-22 PROCEDURE — 1159F MED LIST DOCD IN RCRD: CPT | Mod: CPTII,S$GLB,, | Performed by: PHYSICIAN ASSISTANT

## 2023-05-22 PROCEDURE — 3008F BODY MASS INDEX DOCD: CPT | Mod: CPTII,S$GLB,, | Performed by: PHYSICIAN ASSISTANT

## 2023-05-22 PROCEDURE — 99213 OFFICE O/P EST LOW 20 MIN: CPT | Performed by: PHYSICIAN ASSISTANT

## 2023-05-22 PROCEDURE — 1157F PR ADVANCE CARE PLAN OR EQUIV PRESENT IN MEDICAL RECORD: ICD-10-PCS | Mod: CPTII,S$GLB,, | Performed by: PHYSICIAN ASSISTANT

## 2023-05-22 PROCEDURE — 1126F AMNT PAIN NOTED NONE PRSNT: CPT | Mod: CPTII,S$GLB,, | Performed by: PHYSICIAN ASSISTANT

## 2023-05-22 PROCEDURE — 1160F PR REVIEW ALL MEDS BY PRESCRIBER/CLIN PHARMACIST DOCUMENTED: ICD-10-PCS | Mod: CPTII,S$GLB,, | Performed by: PHYSICIAN ASSISTANT

## 2023-05-22 PROCEDURE — 99999 PR PBB SHADOW E&M-EST. PATIENT-LVL III: ICD-10-PCS | Mod: PBBFAC,,, | Performed by: PHYSICIAN ASSISTANT

## 2023-05-22 PROCEDURE — 3077F SYST BP >= 140 MM HG: CPT | Mod: CPTII,S$GLB,, | Performed by: PHYSICIAN ASSISTANT

## 2023-05-22 PROCEDURE — 1159F PR MEDICATION LIST DOCUMENTED IN MEDICAL RECORD: ICD-10-PCS | Mod: CPTII,S$GLB,, | Performed by: PHYSICIAN ASSISTANT

## 2023-05-22 PROCEDURE — 3078F PR MOST RECENT DIASTOLIC BLOOD PRESSURE < 80 MM HG: ICD-10-PCS | Mod: CPTII,S$GLB,, | Performed by: PHYSICIAN ASSISTANT

## 2023-05-22 PROCEDURE — 1101F PT FALLS ASSESS-DOCD LE1/YR: CPT | Mod: CPTII,S$GLB,, | Performed by: PHYSICIAN ASSISTANT

## 2023-05-22 PROCEDURE — 1126F PR PAIN SEVERITY QUANTIFIED, NO PAIN PRESENT: ICD-10-PCS | Mod: CPTII,S$GLB,, | Performed by: PHYSICIAN ASSISTANT

## 2023-05-22 PROCEDURE — 99215 PR OFFICE/OUTPT VISIT, EST, LEVL V, 40-54 MIN: ICD-10-PCS | Mod: S$GLB,,, | Performed by: PHYSICIAN ASSISTANT

## 2023-05-22 PROCEDURE — 3077F PR MOST RECENT SYSTOLIC BLOOD PRESSURE >= 140 MM HG: ICD-10-PCS | Mod: CPTII,S$GLB,, | Performed by: PHYSICIAN ASSISTANT

## 2023-05-22 PROCEDURE — 99999 PR PBB SHADOW E&M-EST. PATIENT-LVL III: CPT | Mod: PBBFAC,,, | Performed by: PHYSICIAN ASSISTANT

## 2023-05-22 PROCEDURE — 1101F PR PT FALLS ASSESS DOC 0-1 FALLS W/OUT INJ PAST YR: ICD-10-PCS | Mod: CPTII,S$GLB,, | Performed by: PHYSICIAN ASSISTANT

## 2023-05-22 PROCEDURE — 3078F DIAST BP <80 MM HG: CPT | Mod: CPTII,S$GLB,, | Performed by: PHYSICIAN ASSISTANT

## 2023-05-22 PROCEDURE — 3288F FALL RISK ASSESSMENT DOCD: CPT | Mod: CPTII,S$GLB,, | Performed by: PHYSICIAN ASSISTANT

## 2023-05-22 PROCEDURE — 99215 OFFICE O/P EST HI 40 MIN: CPT | Mod: S$GLB,,, | Performed by: PHYSICIAN ASSISTANT

## 2023-05-22 PROCEDURE — 1157F ADVNC CARE PLAN IN RCRD: CPT | Mod: CPTII,S$GLB,, | Performed by: PHYSICIAN ASSISTANT

## 2023-05-22 NOTE — PROGRESS NOTES
Name: Brittany Modi  MRN: [id number]   CSN: [id number]      Date: 05/22/2023    Referring physician:  No referring provider defined for this encounter.    Chief Complaint: tremor       Interval History:  - ldopa trial last visit   - accompanied by    - cd/ld 1 tab TID made tremors go away completely but caused nausea   - now back down to 1/2 tab TID, helping with tremors but still noticeable   - some hot flashes and sweating after she takes cd/ld   - tossing and turning, cannot sleep   - once she falls asleep she is good   - she takes melatonin 10 mg, takes right before she goes to bed   - no falls   - not much exercise   - feels like she has generalized body aches, improves some with ldopa (Even more so whenever she took the full tablet)  - she takes it on an empty stomach    - if she is late to take a dose, lasts about 8 hours   - tightness in the neck is better with ldopa, but with 1 tab felt like it was worse   - frequent urination at night         From Feb 2023 Brittany Modi is a R handed 67 y.o. female with a medical issues significant for GERD who presents for tremors. Referral from Dr. Dulce Elder and Dr. Edvin Love. First noticed tremor in May 2022. Gradual onset. First started off as a fine tremor, at times she didn't notice it. Other people would point it out. She notes that years ago (at least 10 years) she had a head tremor and was diagnosed with dystonia and has since resolved. Tremor started in left hand, only in the left hand. No tremor in the R hand. Denies tremor elsewhere. Notices tremor with rest, action and posture. Denies imbalance or falls. She does note that she has slowed down over the past year. She does not feel that she can suppress the tremor, although at times she has been able to stop tremor by grabbing her left hand with her right hand. Sometimes will raise her L arm above her head and it will momentarily stop but then it will re-emerge. She feels like something is around her  "neck, uncomfortable. Tremor is exacerbated by emotion and stress.     Does not exercise.     She tried inderal in the past but was not beneficial for this tremor.     Does not consume EtOH.       Retired RN.     Family History: none     Neuroleptic Exposure: none       From Jan 2023 with Dr. Elder  Interval history 01/09/2023   Ms Brittany Modi is a 68 y/o R handed F w PMH HTN presenting as follow up for L hand tremor, initially seen on 10/10/22. At that time her tremor was non rhythmic with varying amplitude. No Parkinsonian features noted and there was concern for FND given emotional trigger. She had MRI brain that was unremarkable.   Today she refers, that she is worse. Tremors are more severe, more debilitating. Still only on her left hand but patient it varies degree day to day. She can sometimes control it by holding it or lifting it up. She is also having finger tapping now. She says that the tremors are also influenced by her emotions; she says "if I watch an action movie it gets worse or if I get into an argument."  She refers that she is stressed due to the tremor, but can not think of any underlying stressor that could be provoking it.  She is able to use the hand, like hold a glass.   She denies any vision changes, voice change, dysphagia, dysarthrtia, gate instability, ataxia, weakness.   She takes Vit B, C and D. Estro Ten for hot flashes.            Initial HPI:   Ms. Brittany Modi is a 67 y.o. R handed female w PMH HTN, constipation presenting as initial evaluation for left hand tremor. She was previously seen by Dr. Arreola and Dr. Arciniega (last 5/16/22) for memory problems and tremors, but she wants to change neurologist.   She is having involuntary movement on left hand, started 5 months, she says that now they are more vigorous and more often than it was, it is daily and constant, its worse at rest and subsided with action or lifting. Sami in May when she saw Dr Arreola they were finer movements " and not as debilitating. She refers that if feels like her muscles feel tired and they are stiff. No aching, no pain. Weakness at the fingers and worsens at the end of day, some numbness but no tingling. She can slow it down if she holds it.. Triggers/exacerbated if she is upset/anxious. She refers that she has coordination/balance issues and she has not been able to walk a line for 1 month.   Denies any other medical issues other than HTN and recently constipation. No family history of neurodegenerative/movement disorders. Non smoker, no alcohol use    Nonmotor/Premotor ROS:  Anosmia: poor sense of smell   Dysarthria/Hypophonia: none   Dysphagia/Sialorrhea: none   Depression: yes, apathy   Cognitive slowing: memory is declining, speaking and forgets what she wants to say -- trouble with word finding   Hallucinations: none   Urinary changes: frequency at night   Constipation: chronic   Falls: none   Micrographia: none   Sleep issues:  -RBD: she has been told she talks in her sleep       Review of Systems:   Review of Systems   Constitutional:  Negative for chills, fever and malaise/fatigue.   HENT:  Negative for hearing loss.    Eyes:  Negative for blurred vision and double vision.   Respiratory:  Negative for cough, shortness of breath and stridor.    Cardiovascular:  Negative for chest pain and leg swelling.   Gastrointestinal:  Positive for constipation. Negative for diarrhea and nausea.   Genitourinary:  Positive for frequency. Negative for urgency.   Musculoskeletal:  Negative for falls.   Skin:  Negative for itching and rash.   Neurological:  Positive for tremors. Negative for dizziness, loss of consciousness and weakness.   Psychiatric/Behavioral:  Negative for hallucinations and memory loss.          Past Medical History: The patient  has a past medical history of Arthritis, Chest pain, and GERD (gastroesophageal reflux disease).    Social History: The patient  reports that she has never smoked. She has  "never used smokeless tobacco. She reports that she does not drink alcohol and does not use drugs.    Family History: Their family history includes Asthma in her father; Breast cancer in her sister; Cancer in her sister; Diabetes in her mother; Hypertension in her brother, brother, mother, sister, sister, sister, and son; Hypothyroidism in her mother.    Allergies: Patient has no known allergies.     Meds:   Current Outpatient Medications on File Prior to Visit   Medication Sig Dispense Refill    carbidopa-levodopa  mg (SINEMET)  mg per tablet Take 0.5 tablets by mouth 3 (three) times daily for 7 days, THEN 1 tablet 3 (three) times daily. 101 tablet 11    multivitamin capsule Take 1 capsule by mouth once daily.      [DISCONTINUED] cholecalciferol, vitamin D3, 125 mcg (5,000 unit) Tab Take 5,000 Units by mouth once daily.       No current facility-administered medications on file prior to visit.       Exam:  BP (!) 156/76   Pulse 79   Ht 5' 5" (1.651 m)   Wt 81.6 kg (180 lb)   BMI 29.95 kg/m²     Constitutional  Well-developed, well-nourished, appears stated age   Ophthalmoscopic  No papilledema with no hemorrhages or exudates bilaterally   Cardiovascular  Radial pulses 2+ and symmetric, no LE edema bilaterally   Neurological    * Mental status  MOCA =      - Orientation  Oriented to person, place, time, and situation     - Memory   Intact recent and remote     - Attention/concentration  Attentive, vigilant during exam     - Language  Naming & repetition intact, +2-step commands     - Fund of knowledge  Aware of current events     - Executive  Well-organized thoughts     - Other     * Cranial nerves       - CN II  PERRL, visual fields full to confrontation     - CN III, IV, VI  Extraocular movements full, normal pursuits and saccades     - CN V  Sensation V1 - V3 intact     - CN VII  Face strong and symmetric bilaterally     - CN VIII  Hearing intact bilaterally     - CN IX, X  Palate raises midline " and symmetric     - CN XI  SCM and trapezius 5/5 bilaterally     - CN XII  Tongue midline   * Motor  Muscle bulk normal, strength 5/5 throughout   * Sensory   Intact to light touch    * Coordination  No dysmetria with finger-to-nose or heel-to-shin   * Gait  See below.   * Deep tendon reflexes  3+ and symmetric throughout   Babinski downgoing bilaterally   * Specialized movement exam  No hypophonic speech.    No facial masking.   Mild L > R (only in the wrist on the R) cogwheel rigidity.     L bradykinesia with finger taps, finger flicks,    Resting tremor of L hand -- not distractible, persists with mental tasks  -- she is able to suppress the tremor at times however there is no variability in frequency or amplitude, there is no axis change     Tremor much improved today    Re-emerges with posture and gait eval -- improved    No other chorea, athetosis, myoclonus, or tics.   No motor impersistence.   Normal-based gait.   No shortened stride length.   Decreased L arm swing    No postural instability.     Subtle head tremor, no-no -- dystonic      Laboratory/Radiological:  - Results:  No visits with results within 3 Month(s) from this visit.   Latest known visit with results is:   Lab Visit on 02/02/2023   Component Date Value Ref Range Status    WBC 02/02/2023 4.78  3.90 - 12.70 K/uL Final    RBC 02/02/2023 4.24  4.00 - 5.40 M/uL Final    Hemoglobin 02/02/2023 13.0  12.0 - 16.0 g/dL Final    Hematocrit 02/02/2023 42.5  37.0 - 48.5 % Final    MCV 02/02/2023 100 (H)  82 - 98 fL Final    MCH 02/02/2023 30.7  27.0 - 31.0 pg Final    MCHC 02/02/2023 30.6 (L)  32.0 - 36.0 g/dL Final    RDW 02/02/2023 13.8  11.5 - 14.5 % Final    Platelets 02/02/2023 276  150 - 450 K/uL Final    MPV 02/02/2023 12.5  9.2 - 12.9 fL Final    Immature Granulocytes 02/02/2023 0.2  0.0 - 0.5 % Final    Gran # (ANC) 02/02/2023 2.6  1.8 - 7.7 K/uL Final    Immature Grans (Abs) 02/02/2023 0.01  0.00 - 0.04 K/uL Final    Lymph # 02/02/2023 1.7  1.0  - 4.8 K/uL Final    Mono # 02/02/2023 0.4  0.3 - 1.0 K/uL Final    Eos # 02/02/2023 0.0  0.0 - 0.5 K/uL Final    Baso # 02/02/2023 0.03  0.00 - 0.20 K/uL Final    nRBC 02/02/2023 0  0 /100 WBC Final    Gran % 02/02/2023 54.5  38.0 - 73.0 % Final    Lymph % 02/02/2023 34.7  18.0 - 48.0 % Final    Mono % 02/02/2023 9.2  4.0 - 15.0 % Final    Eosinophil % 02/02/2023 0.8  0.0 - 8.0 % Final    Basophil % 02/02/2023 0.6  0.0 - 1.9 % Final    Differential Method 02/02/2023 Automated   Final    Sodium 02/02/2023 143  136 - 145 mmol/L Final    Potassium 02/02/2023 4.0  3.5 - 5.1 mmol/L Final    Chloride 02/02/2023 104  95 - 110 mmol/L Final    CO2 02/02/2023 28  23 - 29 mmol/L Final    Glucose 02/02/2023 90  70 - 110 mg/dL Final    BUN 02/02/2023 16  8 - 23 mg/dL Final    Creatinine 02/02/2023 1.1  0.5 - 1.4 mg/dL Final    Calcium 02/02/2023 9.9  8.7 - 10.5 mg/dL Final    Total Protein 02/02/2023 8.3  6.0 - 8.4 g/dL Final    Albumin 02/02/2023 4.5  3.5 - 5.2 g/dL Final    Total Bilirubin 02/02/2023 0.5  0.1 - 1.0 mg/dL Final    Alkaline Phosphatase 02/02/2023 64  55 - 135 U/L Final    AST 02/02/2023 30  10 - 40 U/L Final    ALT 02/02/2023 21  10 - 44 U/L Final    Anion Gap 02/02/2023 11  8 - 16 mmol/L Final    eGFR 02/02/2023 55.1 (A)  >60 mL/min/1.73 m^2 Final    Cholesterol 02/02/2023 193  120 - 199 mg/dL Final    Triglycerides 02/02/2023 81  30 - 150 mg/dL Final    HDL 02/02/2023 58  40 - 75 mg/dL Final    LDL Cholesterol 02/02/2023 118.8  63.0 - 159.0 mg/dL Final    HDL/Cholesterol Ratio 02/02/2023 30.1  20.0 - 50.0 % Final    Total Cholesterol/HDL Ratio 02/02/2023 3.3  2.0 - 5.0 Final    Non-HDL Cholesterol 02/02/2023 135  mg/dL Final    Vit D, 25-Hydroxy 02/02/2023 47  30 - 96 ng/mL Final       - Independent review of images:      - Independent review of consultant's notes: Jael Love     ASSESSMENT/PLAN:  Parkinson's disease  - tremor-dominant, L sided, L > R cogwheel rigidity, L bradykinesia   - non motor features  of alpha-synucleinopathy present -- anosmia, constipation, possible RBD  - continue cd/ld 1/2 tab, 1 tab caused nausea and sweating. Consider addition of lodosyn vs converting to rytary   - avoid amantadine in setting of cognitive changes   - increase physical activity   - journal symptoms to see whether dystonia is peak dose vs off phenomenon   - PT     Orders Placed This Encounter    Ambulatory referral/consult to Physical/Occupational Therapy             Follow up: in 6 months with [facility]    Collaborating Physician, Dr. Garvin, was available during today's encounter. Any change to plan along with cosign to appear in the EMR.       Total time spent with the patient: 41 minutes.  32 minutes of face-to-face consultation and 9 minutes of chart review and coordination of care, on the day of the visit. This includes face to face time and non-face to face time preparing to see the patient (eg, review of tests), obtaining and/or reviewing separately obtained history, documenting clinical information in the electronic or other health record, independently interpreting resultsand communicating results to the patient/family/caregiver, or care coordination.         MAYITO LopezT[facility]  Department of Neurology  Movement Disorders

## 2023-05-30 ENCOUNTER — CLINICAL SUPPORT (OUTPATIENT)
Dept: REHABILITATION | Facility: HOSPITAL | Age: 68
End: 2023-05-30
Payer: MEDICARE

## 2023-05-30 DIAGNOSIS — Z74.09 IMPAIRED FUNCTIONAL MOBILITY, BALANCE, GAIT, AND ENDURANCE: Primary | ICD-10-CM

## 2023-05-30 DIAGNOSIS — G20.C PARKINSONISM, UNSPECIFIED PARKINSONISM TYPE: ICD-10-CM

## 2023-05-30 PROCEDURE — 97161 PT EVAL LOW COMPLEX 20 MIN: CPT | Mod: PO

## 2023-05-30 NOTE — PLAN OF CARE
[facility]  Physical Therapy Neurological Rehabilitation Initial Evaluation     Name: Brittany Modi  Clinic Number: [id number]    Therapy Diagnosis:   Encounter Diagnoses   Name Primary?    Parkinsonism, unspecified Parkinsonism type     Impaired functional mobility, balance, gait, and endurance Yes     Physician: Mercedes Hameed P*    Physician Orders: PT Eval and Treat   Medical Diagnosis from Referral: G20 (ICD-10-CM) - Parkinsonism, unspecified Parkinsonism type   Evaluation Date: 5/30/2023  Authorization Period Expiration: 6/27/2023  Plan of Care Expiration: 7/31/2023  Progress Note Due: 6/30/2023  Visit # / Visits authorized: 1/ 1  FOTO: 1/3    Precautions: Standard and Fall    Time In: 10:30  Time Out: 11:16  Total Billable Time: 46 minutes    Subjective      Date of onset: 6-9 months ago symptoms started    History of current condition - Pat reports: she was diagnosed with Parkinson's 3 months ago. She states her main symptoms are the hand tremors, weakness in her bilateral lower extremities, neck and upper back pain. Legs will give out on her every once and a while while standing which is new for her.     Prior Therapy: None  Social History: lives alone  Falls: no falls but 1 near fall - she felt herself falling but she was able to grab onto furniture  DME: none    Home Environment: 1-story home with 4 steps to enter and bilateral handrails   Exercise Routine / History: standing elliptical - tries to do it every day for 10 min   Family Present at time of Eval: none   Occupation: retired  Prior Level of Function: Independent   Current Level of Function: Independent - cautious    Pain:  Current 5/10, worst 8/10, best 3/10   Location: central back  and neck   Description: Tight and stiff  Aggravating Factors: turning her head  Easing Factors: pain medication and ice    Patient's goals: strengthen arms, legs, and back    Medical History:   Past Medical History:   Diagnosis  Date    Arthritis     Chest pain     GERD (gastroesophageal reflux disease)        Surgical History:   Brittany Modi  has a past surgical history that includes Appendectomy (2010); Tubal ligation; Abdominoplasty; Knee surgery (Right, 12/2015); Colonoscopy (N/A, 08/22/2019); Laparoscopic total hysterectomy (N/A, 12/05/2019); Cystoscopy (N/A, 12/05/2019); and Bilateral salpingo-oophorectomy (BSO) (12/05/2019).    Medications:   Brittany has a current medication list which includes the following prescription(s): carbidopa-levodopa  mg and multivitamin.    Allergies:   Review of patient's allergies indicates:  No Known Allergies     Objective      Patient's mobility presenting to therapy evaluation: walks    Mental status: alert, oriented to person, place, and time, normal mood, behavior, speech, dress, motor activity, and thought processes  Appearance: Casually dressed  Behavior:  calm, cooperative, and adequate rapport can be established  Attention Span and Concentration:  Normal  Follows commands: 100% of time   Speech: no deficits     Dominant hand:  right     Posture Alignment in sitting:   Slight head forward, rounded upper back with mild increased kyphosis    Posture Alignment in standing:   Tremor noted in left hand, mild increased kyphosis    Sensation: Light Touch: Impaired: bilateral feet get numb at times - says she has to soak her feet in warm water      Edema observed: No - patient reports her fingers will swell at random times         Tone: normal  Limbs/muscles affected: N/A    Visual/Auditory: denies changes     Coordination:   - fine motor:  Intact  - UE coordination:  Intact  - LE coordination:   Intact    ROM:   UPPER EXTREMITY--AROM/PROM  (R) UE: WNLs  (L) UE: WNLs         RANGE OF MOTION--LOWER EXTREMITIES  (R) LE Hip: normal   Knee: normal   Ankle: normal    (L) LE: Hip: normal   Knee: normal   Ankle: normal    Strength: manual muscle test grades below   Upper Extremity Strength  (R) UE   (L) UE    Shoulder Flexion: 5/5 Shoulder Flexion: 5/5   Shoulder Abduction: 5/5 Shoulder Abduction: 5/5   Shoulder Extension: 5/5 Shoulder Extension:   5/5   Elbow Flexion: 5/5 Elbow Flexion: 5/5   Elbow Extension: 5/5 Elbow Extension: 5/5   Wrist Flexion: 4+/5 Wrist Flexion: 4+/5   Wrist Extension: 4+/5 Wrist Extension: 4+/5   : 4/5 : 4/5     Lower Extremity Strength  Right LE  Left LE    Hip Flexion: 5/5 Hip Flexion: 5/5   Hip Extension:  4+/5 Hip Extension: 4+/5   Hip Abduction: 4+/5 Hip Abduction: 4+/5   Hip Adduction: 5/5 Hip Adduction 5/5   Knee Extension: 5/5 Knee Extension: 4+/5   Knee Flexion: 5/5 Knee Flexion: 4+/5   Ankle Dorsiflexion: 5/5 Ankle Dorsiflexion: 5/5   Ankle Plantarflexion: 5/5 Ankle Plantarflexion: 5/5        Evaluation 05/30/2023   30 second Chair Rise   9 completed with no arms     5 times sit-stand  Fall risk cutoff scores by population:  General >12s  PD >16s  Vestibular/balance over 65 years >/= 15s  CVA >/=  12s   14 seconds with no arms         Balance Testing    Evaluation 05/30/2023   Tandem Stance R LE forward, eyes open 12s  (<30 sec = Increased FALL RISK)   Tandem Stance L LE forward, eyes open 22s  (<30 sec = Increased FALL RISK)   Single Limb Stance R LE  eyes open 3s  (<10 sec = HIGH FALL RISK)   Single Limb Stance L LE  eyes open 2s  (<10 sec = HIGH FALL RISK)       Postural control: MCTSIB: Evaluation 05/30/2023   1. Eyes Open/feet together/Firm:  30 seconds   2. Eyes Closed/feet together/Firm:  30 seconds   3. Eyes Open/feet together/Foam:  30 seconds   4. Eyes Closed/feet together/Foam:  30 seconds       GAIT ASSESSMENT  - AD used: No Assistive Device  - Assistance: independence  - Distance: community distances    Observed Gait Deviations:  Brittany displays the following deviations with ambulation:  Abnormal, decreased step length and contralateral hip drop with stance phase on right      Impairments contributing to deviations/impairments: impaired balance,  impaired sensory feedback, and decreased strength     Evaluation 05/30/2023   Timed Up and Go  (< 20 sec safe for independent transfers, < 30 sec safe for dependent transfers/assist required) 11 sec with No Assistive Device   Self Selected Walking Speed 1.2 m/sec (6m/5s) with No Assistive Device   Fast Walking Speed 1.5 m/sec (6m/4s) with No Assistive Device       Functional Gait Assessment:   1. Gait on level surface =  3   (3) Normal: less than 5.5 sec, no A.D., no imbalance, normal gait pattern, deviates< 6in   (2) Mild impairment: 7-5.6 sec, uses A.D., mild gait deviations, or deviates 6-10 in   (1) Moderate impairment: > 7 sec, slow speed, imbalance, deviates 10-15 in.   (0) Severe impairment: needs assist, deviates >15 in, reach/touch wall  2. Change in Gait Speed = 3   (3) Normal: smooth change w/o loss of balance or gait deviation, deviates < 6 in, significant difference between speeds   (2) Mild impairment: changes speed, but demonstrates mild gait deviations, deviates 6-10 in, OR no deviations but unable to significantly speed, OR uses A.D.   (1) Moderate impairment: minor changes to speed, OR changes speed w/ significant deviations, deviates 10-15 in, OR  Changes speed , but loses balance & recovers   (0) Severe impairment: cannot change speed, deviates >15 in, or loses balance & needs assist  3. Gait with horizontal head turns  = 2   (3) Normal: no change in gait, deviates <6 in   (2) Mild impairment: slight change in speed, deviates 6-10 in, OR uses A.D.   (1) Moderate impairment: moderate change in speed, deviates 10-15 in   (0) Severe impairment: severe disruption of gait, deviates >15in  4. Gait with vertical head turns = 3   (3) Normal: no change in gait, deviates <6 in   (2) Mild impairment: slight change in speed, deviates 6-10 in OR uses A.D.   (1) Moderate impairment: moderate change in speed, deviates 10-15 in   (0) Severe impairment: severe disruption of gait, deviates >15 in  5. Gait with  pivot turns = 3   (3) Normal: performs safely in 3 sec, no LOB   (2) Mild impairment: performs in >3 sec & no LOB, OR turns safely & requires several steps to regain LOB   (1) Moderate impairment: turns slow, OR requires several small steps for balance following turn & stop   (0) Severe impairment: cannot turn safely, needs assist  6. Step over obstacle = 2   (3) Normal: steps over 2 stacked boxes w/o change in speed or LOB   (2) Mild impairment: able to step over 1 box w/o change in speed or LOB   (1) Moderate impairment: steps over 1 box but must slow down, may require VC   (0) Severe impairment: cannot perform w/o assist  7. Gait with Narrow MAI = 2   (3) Normal: 10 steps no staggering   (2) Mild impairment: 7-9 steps   (1) Moderate impairment: 4-7 steps   (0) Severe impairment: < 4 steps or cannot perform w/o assist  8. Gait with eyes closed = 1   (3) Normal: < 7 sec, no A.D., no LOB, normal gait pattern, deviates <6 in   (2) Mild impairment: 7.1-9 sec, mild gait deviations, deviates 6-10 in   (1) Moderate impairment: > 9 sec, abnormal pattern, LOB, deviates 10-15 in   (0) Severe impairment: cannot perform w/o assist, LOB, deviates >15in  9. Ambulating Backwards = 2   (3) Normal: no A.D., no LOB, normal gait pattern, deviates <6in   (2) Mild impairment: uses A.D., slower speed, mild gait deviations, deviates 6-10 in   (1) Moderate impairment: slow speed, abnormal gait pattern, LOB, deviates 10-15 in   (0) Severe impairment: severe gait deviations or LOB, deviates >15in  10. Steps = 2   (3) Normal: alternating feet, no rail   (2) Mild Impairment: alternating feet, uses rail   (1) Moderate impairment: step-to, uses rail   (0) Severe impairment: cannot perform safely    Score 23/30     Score:   <22/30 fall risk   <20/30 fall risk in older adults   <18/30 fall risk in Parkinsons       Endurance Deficit: mild    6-Minute Walk Test  RPE at 3 min: 2/10  RPE at 6 min: 3/10  Distance: 1183 ft      CMS  Impairment/Limitation/Restriction for FOTO Degenerative CNS Disorders Survey    Therapist reviewed FOTO scores for Brittany Modi on 5/30/2023.   FOTO documents entered into [employer] - see Media section.    Limitation Score: 42%       Treatment     No treatment provided today, all time spent performing initial evaluation.    Patient Education and Home Exercises     Education:   HEP will be issued and reviewed next session.     Assessment     Brittany is a 67 y.o. female referred to outpatient Physical Therapy with a medical diagnosis of Parkinsonism. Patient presents with mildly decreased strength in bilateral lower extremities, impaired sensory input in bilateral feet, and decreased standing balance. Due to these deficits, she has notable gait deficits including a hip drop bilaterally (left > right), and decreased arm swing on the left. In addition, she scored a 23/30 on the FGA which is technically above the fall risk cut-off but still with need for improvement with dynamic gait. Furthermore, she is below the norms for her 6MWT distance despite reporting a low RPE and could improve her understanding of intensity with exercise to maximize benefit. She is a good candidate for outpatient neuro PT at this time.      Patient prognosis is Good.   Patient will benefit from skilled outpatient Physical Therapy to address the deficits stated above and in the chart below, provide patient /family education, and to maximize patientt's level of independence.     Plan of care discussed with patient: Yes  Patient's spiritual, cultural and educational needs considered and patient is agreeable to the plan of care and goals as stated below:     Anticipated Barriers for therapy: none    Medical Necessity is demonstrated by the following  History  Co-morbidities and personal factors that may impact the plan of care [x] LOW: no personal factors / co-morbidities  [] MODERATE: 1-2 personal factors / co-morbidities  [] HIGH: 3+ personal  factors / co-morbidities    Moderate / High Support Documentation:   Co-morbidities affecting plan of care: N/A    Personal Factors:   no deficits     Examination  Body Structures and Functions, activity limitations and participation restrictions that may impact the plan of care [x] LOW: addressing 1-2 elements  [] MODERATE: 3+ elements  [] HIGH: 4+ elements (please support below)    Moderate / High Support Documentation: N/A     Clinical Presentation [x] LOW: stable  [] MODERATE: Evolving  [] HIGH: Unstable     Decision Making/ Complexity Score: low       Goals:  Short Term Goals: 4 weeks   Pt will improve bilaterally LE hip abduction MMT scores to 5/5 for improved stability with stance and functional mobility.   Pt will improve 30s chair rise score to at least 11 reps without UE assist for improved muscular endurance.   Pt will improve tandem stance left to at least 25s for decreased fall risk.   Pt will improve tandem stance right to at least 15s for decreased fall risk.   Pt will improve single leg stance (SLS) bilaterally to at least 5s for decreased fall risk.   Pt will improve Functional Gait Assessment (FGA) score to at least 24/30 for increased independence with home and community ambulation.     Long Term Goals: 8 weeks   Pt will report pain at neck/upper back at </= 3/10 entering clinic.  Pt will improve left LE knee flexion MMT scores to 5/5 for improved stability with stance and functional mobility.   Pt will improve left LE knee extension MMT scores to 5/5 for improved stability with stance and functional mobility.   Pt will improve 30s chair rise score to at least 13 reps without UE assist for improved muscular endurance.   Pt will improve tandem stance left to at least 30s for decreased fall risk.   Pt will improve tandem stance right to at least 20s for decreased fall risk.   Pt will improve single leg stance (SLS) bilaterally to at least 10s for decreased fall risk.   Pt will improve Functional  Gait Assessment (FGA) score to at least 26/30 for increased independence with home and community ambulation.   Pt will improve FOTO limitation score to </= 33% for improved self perception of functional mobility.  Pt to perform 6 minute walk test for 1305 feet or greater to improve gait speed & endurance  Plan     Plan of care Certification: 5/30/2023 to 7/31/2023.    Outpatient Physical Therapy 2 times weekly for 8 weeks to include the following interventions: Gait Training, Manual Therapy, Moist Heat/ Ice, Neuromuscular Re-ed, Patient Education, Therapeutic Activities, and Therapeutic Exercise.     Aniya Bowden, PT

## 2023-06-06 ENCOUNTER — CLINICAL SUPPORT (OUTPATIENT)
Dept: REHABILITATION | Facility: HOSPITAL | Age: 68
End: 2023-06-06
Payer: MEDICARE

## 2023-06-06 ENCOUNTER — DOCUMENTATION ONLY (OUTPATIENT)
Dept: REHABILITATION | Facility: HOSPITAL | Age: 68
End: 2023-06-06

## 2023-06-06 DIAGNOSIS — Z74.09 IMPAIRED FUNCTIONAL MOBILITY, BALANCE, GAIT, AND ENDURANCE: Primary | ICD-10-CM

## 2023-06-06 PROCEDURE — 97110 THERAPEUTIC EXERCISES: CPT | Mod: PO,CQ

## 2023-06-06 NOTE — PROGRESS NOTES
PT/PTA met face to face to discuss pt's treatment plan and progress towards established goals.  Continue with current PT POC with focus on balance, gait and amplitude.  Patient will be seen by physical therapist at least every sixth treatment or 30 days, whichever occurs first.    Steff Draper, PTA  06/06/2023

## 2023-06-06 NOTE — PROGRESS NOTES
"[facility]   Physical Therapy Treatment Note      Name: Brittany Modi  Clinic Number: [id number]    Therapy Diagnosis:   Encounter Diagnosis   Name Primary?    Impaired functional mobility, balance, gait, and endurance Yes     Physician: Mercedes Hameed P*    Visit Date: 6/6/2023  Physician Orders: PT Eval and Treat   Medical Diagnosis from Referral: G20 (ICD-10-CM) - Parkinsonism, unspecified Parkinsonism type   Evaluation Date: 5/30/2023  Authorization Period Expiration: 6/27/2023  Plan of Care Expiration: 7/31/2023  Progress Note Due: 6/30/2023  Visit # / Visits authorized: 1/ 11 ( plus eval)  FOTO: 1/3     Precautions: Standard and Fall    PTA Visit #: 1/5     Time In: 14:30  Time Out: 15:15  Total Billable Time: 45 minutes    Subjective     Pt reports: " I'm doing pretty good."  She  will be given an HEP today    Response to previous treatment: no adverse reactions  Functional change: ongoing    Pain: 0/10  Location: N/A    Objective      Objective Measures updated at progress report unless specified.     Treatment     Pat received the treatments listed below:      therapeutic exercises to develop strength, endurance, ROM, flexibility, posture, and core stabilization for 45 minutes including:    X 8 min on [employer] recumbent stepper.  B LE /B UE on level 1.0    2 x 30 sec of B LE HS stretches on steps with 1 UE support  2 x 30 sec of B LE HC stretches on incline with 1 UE support    Education PT on HEP including:   2 x 10 reps of B LE Heel raises/toe raises  2 x 10 reps of B LE hip flexion   2 x 10 reps of B LE HS curls  2 x 10 reps of B LE hip extension  2 x 10 reps of B LE hip abd/add   1 x 10 reps of mini squats    neuromuscular re-education activities to improve: Balance, Coordination, Kinesthetic, Sense, Proprioception, and Posture for 0 minutes. The following activities were included:      therapeutic activities to improve functional performance for 0  minutes, " including:      gait training to improve functional mobility and safety for 0  minutes, including:          Patient Education and Home Exercises       Education provided:   - HEP    Written Home Exercises Provided: yes. Exercises were reviewed and Pat was able to demonstrate them prior to the end of the session.  Pat demonstrated good  understanding of the education provided. See EMR under Patient Instructions for exercises provided during therapy sessions    Assessment     Pat tolerated her first tx session following initial evaluation following initial evaluation well and did not have any problems noted.  Pat began cardiovascular endurance on the stepper and was educated on her first installment of HEP.  Pat was able to demonstrate understanding of all exercises.  Begin higher level balance next tx session and introduce amplitude exercises next tx session.  Cont with plan of care.    Pat Is progressing well towards her goals.   Pt prognosis is Good.     Pt will continue to benefit from skilled outpatient physical therapy to address the deficits listed in the problem list box on initial evaluation, provide pt/family education and to maximize pt's level of independence in the home and community environment.     Pt's spiritual, cultural and educational needs considered and pt agreeable to plan of care and goals.     Anticipated barriers to physical therapy: none    Goals:  Short Term Goals: 4 weeks   Pt will improve bilaterally LE hip abduction MMT scores to 5/5 for improved stability with stance and functional mobility.   Pt will improve 30s chair rise score to at least 11 reps without UE assist for improved muscular endurance.   Pt will improve tandem stance left to at least 25s for decreased fall risk.   Pt will improve tandem stance right to at least 15s for decreased fall risk.   Pt will improve single leg stance (SLS) bilaterally to at least 5s for decreased fall risk.   Pt will improve Functional Gait  Assessment (FGA) score to at least 24/30 for increased independence with home and community ambulation.      Long Term Goals: 8 weeks   Pt will report pain at neck/upper back at </= 3/10 entering clinic.  Pt will improve left LE knee flexion MMT scores to 5/5 for improved stability with stance and functional mobility.   Pt will improve left LE knee extension MMT scores to 5/5 for improved stability with stance and functional mobility.   Pt will improve 30s chair rise score to at least 13 reps without UE assist for improved muscular endurance.   Pt will improve tandem stance left to at least 30s for decreased fall risk.   Pt will improve tandem stance right to at least 20s for decreased fall risk.   Pt will improve single leg stance (SLS) bilaterally to at least 10s for decreased fall risk.   Pt will improve Functional Gait Assessment (FGA) score to at least 26/30 for increased independence with home and community ambulation.   Pt will improve FOTO limitation score to </= 33% for improved self perception of functional mobility.  Pt to perform 6 minute walk test for 1305 feet or greater to improve gait speed & endurance    Plan     Cont with strengthening, endurance, balance and amplitude    Steff Draper, PTA

## 2023-06-06 NOTE — PATIENT INSTRUCTIONS
Hip Abduction: Standing Side Leg Lift (Eccentric)        Lift leg out to side quickly. Slowly lower for 3-5 seconds. Perform reps per set, _2x10__ sets per day       [email or web address]     Copyright © I. All rights reserved.   Heel Raises        Stand with support. Tighten pelvic floor and hold. With knees straight, raise heels off ground. Hold ___ seconds. Relax for ___ seconds.  Repeat ___ times. Do ___ times a day.    Copyright © I. All rights reserved.   FUNCTIONAL MOBILITY: Marching - Standing        March in place by lifting left leg up, then right. Alternate.  __10_ reps per set, _2__ sets per day, ___ days per week Hold onto a support.    Copyright © I. All rights reserved.   Leg Flexion        Inhale. While exhaling, lift one ankle toward buttocks, keeping knees together. Slowly return to starting position.  Repeat __10__ times each leg. Do _2___ sets per session. Do ____ sessions per day.      Copyright © [employer]. All rights reserved.   Hip Extension (Standing)        Stand with support. Squeeze pelvic floor and hold. Move right leg backward with straight knee. Hold for _2__ seconds. Relax for __2_ seconds.  Repeat _2x10__ times. Do ___ times a day.  Repeat with other leg.      Copyright © I. All rights reserved.   Mini-Squats (Standing)        Stand with support. Bend knees slightly. Tighten pelvic floor. Hold for 5___ seconds. Return to straight standing.   Repeat _2x10__ times.     Copyright © [employer]. All rights reserved.

## 2023-06-07 ENCOUNTER — PES CALL (OUTPATIENT)
Dept: ADMINISTRATIVE | Facility: CLINIC | Age: 68
End: 2023-06-07
Payer: MEDICARE

## 2023-06-20 ENCOUNTER — CLINICAL SUPPORT (OUTPATIENT)
Dept: REHABILITATION | Facility: HOSPITAL | Age: 68
End: 2023-06-20
Payer: MEDICARE

## 2023-06-20 DIAGNOSIS — Z74.09 IMPAIRED FUNCTIONAL MOBILITY, BALANCE, GAIT, AND ENDURANCE: Primary | ICD-10-CM

## 2023-06-20 PROCEDURE — 97112 NEUROMUSCULAR REEDUCATION: CPT | Mod: PO,CQ

## 2023-06-20 PROCEDURE — 97110 THERAPEUTIC EXERCISES: CPT | Mod: PO,CQ

## 2023-06-20 PROCEDURE — 97530 THERAPEUTIC ACTIVITIES: CPT | Mod: PO,CQ

## 2023-06-20 NOTE — PROGRESS NOTES
"[facility]   Physical Therapy Treatment Note      Name: Brittany Modi  Clinic Number: [id number]    Therapy Diagnosis:   Encounter Diagnosis   Name Primary?    Impaired functional mobility, balance, gait, and endurance Yes     Physician: Mercedes Hameed P*    Visit Date: 6/20/2023  Physician Orders: PT Eval and Treat   Medical Diagnosis from Referral: G20 (ICD-10-CM) - Parkinsonism, unspecified Parkinsonism type   Evaluation Date: 5/30/2023  Authorization Period Expiration: 6/27/2023  Plan of Care Expiration: 7/31/2023  Progress Note Due: 6/30/2023  Visit # / Visits authorized: 2/ 11 ( plus eval)  FOTO: 1/3     Precautions: Standard and Fall    PTA Visit #: 2/5     Time In: 13:00  Time Out: 13:45  Total Billable Time:45 minutes    Subjective     Pt reports: " Better."   She was compliant with her HEP  Response to previous treatment: no adverse reactions  Functional change: ongoing    Pain: 0/10  Location: N/A    Objective      Objective Measures updated at progress report unless specified.     Treatment     Pat received the treatments listed below:      therapeutic exercises to develop strength, endurance, ROM, flexibility, posture, and core stabilization for 15 minutes including:    X 8 min on recumbent bike.  B LE level 3.0    2 x 30 sec of B LE HS stretches on steps with 1 UE support  2 x 30 sec of B LE HC stretches on incline with 1 UE support        neuromuscular re-education activities to improve: Balance, Coordination, Kinesthetic, Sense, Proprioception, and Posture for 15  minutes. The following activities were included:  Near ballet bar:   2 x 30 sec of B LE single leg stance with 1 UE support  2 x 30 sec of tandem stance with 1 UE support to occasional no UE support  X 6 laps of forward tandem ambulation with 1 UE support occasionally  1 x 10 reps of B LE single leg forward step and back with arms outstretched and no UE support  1 x 10 reps of B LE single leg side step " and back with arms outstretched and no UE support      therapeutic activities to improve functional performance for 15  minutes, including:  Sitting in Gold Chair:   1 x 8 reps of floor to ceiling stretches with 10 sec hold  1 x 8 reps of side to side stretches with 10 sec hold  1 x 10 reps of sit to stand with big arms  1 x 10 reps of forward rock and reach with reciprocal arm swing       gait training to improve functional mobility and safety for 0  minutes, including:          Patient Education and Home Exercises       Education provided:   - HEP    Written Home Exercises Provided: yes. Exercises were reviewed and Xenia was able to demonstrate them prior to the end of the session.  Pat demonstrated good  understanding of the education provided. See EMR under Patient Instructions for exercises provided during therapy sessions    Assessment     Pat tolerated her tx session well and did not have any problems noted.  Pat was educated on amplitude exercises and began dynamic balance near the ballet bar today.  Pat did not have any loss of balance noted today.  Cont with plan of care.     Pat Is progressing well towards her goals.   Pt prognosis is Good.     Pt will continue to benefit from skilled outpatient physical therapy to address the deficits listed in the problem list box on initial evaluation, provide pt/family education and to maximize pt's level of independence in the home and community environment.     Pt's spiritual, cultural and educational needs considered and pt agreeable to plan of care and goals.     Anticipated barriers to physical therapy: none    Goals:  Short Term Goals: 4 weeks   Pt will improve bilaterally LE hip abduction MMT scores to 5/5 for improved stability with stance and functional mobility.   Pt will improve 30s chair rise score to at least 11 reps without UE assist for improved muscular endurance.   Pt will improve tandem stance left to at least 25s for decreased fall risk.   Pt will  improve tandem stance right to at least 15s for decreased fall risk.   Pt will improve single leg stance (SLS) bilaterally to at least 5s for decreased fall risk.   Pt will improve Functional Gait Assessment (FGA) score to at least 24/30 for increased independence with home and community ambulation.      Long Term Goals: 8 weeks   Pt will report pain at neck/upper back at </= 3/10 entering clinic.  Pt will improve left LE knee flexion MMT scores to 5/5 for improved stability with stance and functional mobility.   Pt will improve left LE knee extension MMT scores to 5/5 for improved stability with stance and functional mobility.   Pt will improve 30s chair rise score to at least 13 reps without UE assist for improved muscular endurance.   Pt will improve tandem stance left to at least 30s for decreased fall risk.   Pt will improve tandem stance right to at least 20s for decreased fall risk.   Pt will improve single leg stance (SLS) bilaterally to at least 10s for decreased fall risk.   Pt will improve Functional Gait Assessment (FGA) score to at least 26/30 for increased independence with home and community ambulation.   Pt will improve FOTO limitation score to </= 33% for improved self perception of functional mobility.  Pt to perform 6 minute walk test for 1305 feet or greater to improve gait speed & endurance    Plan     Cont with strengthening, endurance, balance and amplitude    Steff Draper, PTA

## 2023-06-23 ENCOUNTER — CLINICAL SUPPORT (OUTPATIENT)
Dept: REHABILITATION | Facility: HOSPITAL | Age: 68
End: 2023-06-23
Payer: MEDICARE

## 2023-06-23 DIAGNOSIS — Z74.09 IMPAIRED FUNCTIONAL MOBILITY, BALANCE, GAIT, AND ENDURANCE: Primary | ICD-10-CM

## 2023-06-23 PROCEDURE — 97112 NEUROMUSCULAR REEDUCATION: CPT | Mod: PO

## 2023-06-23 PROCEDURE — 97110 THERAPEUTIC EXERCISES: CPT | Mod: PO

## 2023-06-23 NOTE — PROGRESS NOTES
"[facility]   Physical Therapy Treatment Note      Name: Brittany Modi  Clinic Number: [id number]    Therapy Diagnosis:   Encounter Diagnosis   Name Primary?    Impaired functional mobility, balance, gait, and endurance Yes       Physician: Mercedes Hameed P*    Visit Date: 6/23/2023  Physician Orders: PT Eval and Treat   Medical Diagnosis from Referral: G20 (ICD-10-CM) - Parkinsonism, unspecified Parkinsonism type   Evaluation Date: 5/30/2023  Authorization Period Expiration: 6/27/2023  Plan of Care Expiration: 7/31/2023  Progress Note Due: 6/30/2023  Visit # / Visits authorized: 3/ 11 ( plus eval)  FOTO: 1/3     Precautions: Standard and Fall    PTA Visit #: 0/5     Time In: 12:25  Time Out: 13:15  Total Billable Time: 50 minutes    Subjective     Pt reports: "I'm not getting worse but I'm not getting better." She does report some stiffness and discomfort but no pain.    She was compliant with her HEP  Response to previous treatment: no adverse reactions  Functional change: ongoing    Pain: 0/10  Location: N/A    Objective      Objective Measures updated at progress report unless specified.     Treatment     Pat received the treatments listed below:      therapeutic exercises to develop strength, endurance, ROM, flexibility, posture, and core stabilization for 8 minutes including:    X 8 min on recumbent bike.  B LE level 3.0    neuromuscular re-education activities to improve: Balance, Coordination, Kinesthetic, Sense, Proprioception, and Posture for 42  minutes. The following activities were included:    Jyoti PRO:  - Open react foot tapping, 3 x 1:30 min rounds  - Open react taps with 3 Jyoit Pro placed ~6 ft apart (one on floor, one mat, one on rolling stool), 3 x 1 min rounds    Boxing:  - 3 x 1 min rounds: step over half foam roll and punch target; PT called out which leg to step over half foam roll with and patient had dual task instruction to punch with opposite " arm    Throwing + catching 5# dowel:  - Patient threw dowel to PT then had to jog for ~10 ft to turn and catch dowel thrown from PT  - PT threw dowel to patient and then patient had catch dowel and turn 180 deg in 2 BIG steps to then throw dowel to PT    Seated rest breaks as needed during all activities.    therapeutic activities to improve functional performance for 00 minutes, including:  N/A    gait training to improve functional mobility and safety for 0  minutes, including:      Patient Education and Home Exercises       Education provided:   - HEP    Written Home Exercises Provided: Patient instructed to cont prior HEP. Exercises were reviewed and Pat was able to demonstrate them prior to the end of the session.  Pat demonstrated good  understanding of the education provided. See EMR under Patient Instructions for exercises provided during therapy sessions    Assessment     Pat demonstrated great tolerance to today's session with a focus on higher intensity movements and dual-task challenges. She had more trouble coordinating cross-body movements during the boxing activity, but improved over time. She also had no significant loss of balance noted during any activities despite need to turn on one foot, look in different directions, and reach down to the floor. Overall, she appears to be doing well with functional tasks with deficits noted only with higher skill activities. She remains appropriate for outpatient neuro PT at this time.     Pat Is progressing well towards her goals.   Pt prognosis is Good.     Pt will continue to benefit from skilled outpatient physical therapy to address the deficits listed in the problem list box on initial evaluation, provide pt/family education and to maximize pt's level of independence in the home and community environment.     Pt's spiritual, cultural and educational needs considered and pt agreeable to plan of care and goals.     Anticipated barriers to physical therapy:  none    Goals:  Short Term Goals: 4 weeks   Pt will improve bilaterally LE hip abduction MMT scores to 5/5 for improved stability with stance and functional mobility. ongoing  Pt will improve 30s chair rise score to at least 11 reps without UE assist for improved muscular endurance. ongoing  Pt will improve tandem stance left to at least 25s for decreased fall risk. ongoing  Pt will improve tandem stance right to at least 15s for decreased fall risk. ongoing  Pt will improve single leg stance (SLS) bilaterally to at least 5s for decreased fall risk. ongoing  Pt will improve Functional Gait Assessment (FGA) score to at least 24/30 for increased independence with home and community ambulation. ongoing     Long Term Goals: 8 weeks   Pt will report pain at neck/upper back at </= 3/10 entering clinic.  Pt will improve left LE knee flexion MMT scores to 5/5 for improved stability with stance and functional mobility. ongoing  Pt will improve left LE knee extension MMT scores to 5/5 for improved stability with stance and functional mobility. ongoing  Pt will improve 30s chair rise score to at least 13 reps without UE assist for improved muscular endurance. ongoing  Pt will improve tandem stance left to at least 30s for decreased fall risk. ongoing  Pt will improve tandem stance right to at least 20s for decreased fall risk. ongoing  Pt will improve single leg stance (SLS) bilaterally to at least 10s for decreased fall risk. ongoing  Pt will improve Functional Gait Assessment (FGA) score to at least 26/30 for increased independence with home and community ambulation. ongoing  Pt will improve FOTO limitation score to </= 33% for improved self perception of functional mobility. ongoing  Pt to perform 6 minute walk test for 1305 feet or greater to improve gait speed & endurance ongoing    Plan     Continue PT plan of care with a focus on strengthening, endurance, balance and coordination    Aniya Bowden, PT

## 2023-07-06 ENCOUNTER — DOCUMENTATION ONLY (OUTPATIENT)
Dept: REHABILITATION | Facility: HOSPITAL | Age: 68
End: 2023-07-06
Payer: MEDICARE

## 2023-07-06 NOTE — PROGRESS NOTES
PT/PTA met face to face to discuss pt's treatment plan and progress towards established goals.  Continue with current PT POC with focus on dual tasking, coordination, balance and endurance.  Patient will be seen by physical therapist at least every sixth treatment or 30 days, whichever occurs first.    Steff Draper, PTA  07/06/2023

## 2023-07-21 ENCOUNTER — DOCUMENTATION ONLY (OUTPATIENT)
Dept: REHABILITATION | Facility: HOSPITAL | Age: 68
End: 2023-07-21

## 2023-07-21 NOTE — PROGRESS NOTES
PHYSICAL THERAPY ASSESSMENT & DISCHARGE SUMMARY   Total No-Shows: 0  Total Cancels: rest of appointments    Spoke with Ms. Michaels over the phone. Per patient, she cancelled the rest of her appointments because her transportation can no longer bring her to her appointments and she has no other way of making it. She would like to resume therapy but it may be awhile until she can get transportation. PT edu her on clinic policy and patient agreeable to formal discharge from outpatient neuro PT. Discussed with patient how to obtain new referral to restart therapy when she is able.      Status Towards Goals Met:   Short Term Goals: 4 weeks   Pt will improve bilaterally LE hip abduction MMT scores to 5/5 for improved stability with stance and functional mobility. NOT MET  Pt will improve 30s chair rise score to at least 11 reps without UE assist for improved muscular endurance. NOT MET  Pt will improve tandem stance left to at least 25s for decreased fall risk. NOT MET  Pt will improve tandem stance right to at least 15s for decreased fall risk. NOT MET  Pt will improve single leg stance (SLS) bilaterally to at least 5s for decreased fall risk. NOT MET  Pt will improve Functional Gait Assessment (FGA) score to at least 24/30 for increased independence with home and community ambulation. NOT MET     Long Term Goals: 8 weeks   Pt will report pain at neck/upper back at </= 3/10 entering clinic.  Pt will improve left LE knee flexion MMT scores to 5/5 for improved stability with stance and functional mobility. NOT MET  Pt will improve left LE knee extension MMT scores to 5/5 for improved stability with stance and functional mobility. NOT MET  Pt will improve 30s chair rise score to at least 13 reps without UE assist for improved muscular endurance. NOT MET  Pt will improve tandem stance left to at least 30s for decreased fall risk. NOT MET  Pt will improve tandem stance right to at least 20s for decreased fall risk. NOT MET  Pt  will improve single leg stance (SLS) bilaterally to at least 10s for decreased fall risk. NOT MET  Pt will improve Functional Gait Assessment (FGA) score to at least 26/30 for increased independence with home and community ambulation. NOT MET  Pt will improve FOTO limitation score to </= 33% for improved self perception of functional mobility. NOT MET  Pt to perform 6 minute walk test for 1305 feet or greater to improve gait speed & endurance NOT MET    Goals Not achieved and why:   noncompliance with attendance due to lack of transportation    Discharge reason : Patient self discharge    PLAN   This patient is discharged from Outpatient Physical Therapy Services.     Aniya Bowden, PT  07/21/2023

## 2023-09-20 DIAGNOSIS — Z78.0 MENOPAUSE: ICD-10-CM

## 2023-12-01 ENCOUNTER — OFFICE VISIT (OUTPATIENT)
Dept: URGENT CARE | Facility: CLINIC | Age: 68
End: 2023-12-01
Payer: MEDICARE

## 2023-12-01 VITALS
SYSTOLIC BLOOD PRESSURE: 145 MMHG | DIASTOLIC BLOOD PRESSURE: 84 MMHG | HEART RATE: 71 BPM | WEIGHT: 180 LBS | BODY MASS INDEX: 29.99 KG/M2 | OXYGEN SATURATION: 97 % | RESPIRATION RATE: 19 BRPM | TEMPERATURE: 98 F | HEIGHT: 65 IN

## 2023-12-01 DIAGNOSIS — B96.89 BACTERIAL URI: ICD-10-CM

## 2023-12-01 DIAGNOSIS — J06.9 BACTERIAL URI: ICD-10-CM

## 2023-12-01 DIAGNOSIS — J06.9 URI WITH COUGH AND CONGESTION: Primary | ICD-10-CM

## 2023-12-01 LAB
CTP QC/QA: YES
SARS-COV-2 AG RESP QL IA.RAPID: NEGATIVE

## 2023-12-01 PROCEDURE — 87811 SARS CORONAVIRUS 2 ANTIGEN POCT, MANUAL READ: ICD-10-PCS | Mod: QW,S$GLB,, | Performed by: FAMILY MEDICINE

## 2023-12-01 PROCEDURE — 99213 OFFICE O/P EST LOW 20 MIN: CPT | Mod: S$GLB,,, | Performed by: FAMILY MEDICINE

## 2023-12-01 PROCEDURE — 99213 PR OFFICE/OUTPT VISIT, EST, LEVL III, 20-29 MIN: ICD-10-PCS | Mod: S$GLB,,, | Performed by: FAMILY MEDICINE

## 2023-12-01 PROCEDURE — 87811 SARS-COV-2 COVID19 W/OPTIC: CPT | Mod: QW,S$GLB,, | Performed by: FAMILY MEDICINE

## 2023-12-01 RX ORDER — DEXTROMETHORPHAN HBR AND GUAIFENESIN 5; 100 MG/5ML; MG/5ML
5 LIQUID ORAL NIGHTLY PRN
Qty: 118 ML | Refills: 0 | Status: SHIPPED | OUTPATIENT
Start: 2023-12-01 | End: 2024-03-06 | Stop reason: SDUPTHER

## 2023-12-01 RX ORDER — BENZONATATE 200 MG/1
200 CAPSULE ORAL 3 TIMES DAILY PRN
Qty: 30 CAPSULE | Refills: 0 | Status: SHIPPED | OUTPATIENT
Start: 2023-12-01 | End: 2023-12-11

## 2023-12-01 RX ORDER — AZITHROMYCIN 250 MG/1
TABLET, FILM COATED ORAL
Qty: 6 TABLET | Refills: 0 | Status: SHIPPED | OUTPATIENT
Start: 2023-12-01 | End: 2023-12-06

## 2023-12-01 RX ORDER — LEVOCETIRIZINE DIHYDROCHLORIDE 5 MG/1
5 TABLET, FILM COATED ORAL NIGHTLY
Qty: 14 TABLET | Refills: 0 | Status: SHIPPED | OUTPATIENT
Start: 2023-12-01 | End: 2023-12-15

## 2023-12-01 NOTE — PATIENT INSTRUCTIONS
Below are suggestions for symptomatic relief of your upper respiratory symptoms:              -Salt water gargles to soothe throat pain.              -Chloroseptic spray and Cepacol lozenges also help to numb throat pain.              -Warm herbal teas with honey/lemon/maria ines can help soothe sore throat and hoarseness              -Nasal saline spray reduces inflammation and dryness.              -Warm face compresses to help with facial sinus pain/pressure.              -Humidifiers and steam can help with nasal dryness and congestion              -Vicks vapor rub at night for chest congestion.              -Flonase OTC or Nasacort OTC for nasal congestion and post-nasal drip. Ok to use twice daily for the first week, then reduce to once daily after symptoms have begun to improve.              -Afrin is a nasal spray that can give immediate relief of nasal congestion but you cannot use this medication for more than 3 days              -Simple foods like chicken noodle soup.              - Mucinex for congestion or Mucinex DM for cough during the day time. Delsym helps with coughing at night. Mucinex-D if you have sinus pressure/sinus pain or chest congestion. (caution if history of high blood pressure or palpitations). You must increase your water intake when using expectorants (Mucinex).             -Zyrtec/Claritin/Allegra/Xyzal should help with allergies.  -If you DO NOT have Hypertension or any history of palpitations, it is ok to take over the counter Sudafed or Mucinex D or Allegra-D or Claritin-D or Zyrtec-D.  -If you do take one of the above, it is ok to combine that with plain over the counter Mucinex or Allegra or Claritin or Zyrtec. If, for example, you are taking Zyrtec -D, you can combine that with Mucinex, but not Mucinex-D.  If you are taking Mucinex-D, you can combine that with plain Allegra or Claritin or Zyrtec.   -If you DO have Hypertension or palpitations, it is safe to take Coricidin HBP for  relief of sinus symptoms.

## 2023-12-01 NOTE — PROGRESS NOTES
"Subjective:      Patient ID: Brittany Modi is a 68 y.o. female.    Vitals:  height is 5' 5" (1.651 m) and weight is 81.6 kg (180 lb). Her oral temperature is 98.1 °F (36.7 °C). Her blood pressure is 145/84 (abnormal) and her pulse is 71. Her respiration is 19 and oxygen saturation is 97%.     Chief Complaint: Cough    Cough  This is a new problem. The current episode started in the past 7 days (started monday). The problem has been gradually worsening. The problem occurs every few minutes. The cough is Productive of sputum. Associated symptoms include chills, headaches, nasal congestion and a sore throat. Pertinent negatives include no ear congestion, fever, postnasal drip, rash, shortness of breath, sweats, weight loss or wheezing. The symptoms are aggravated by lying down (at night). She has tried OTC cough suppressant for the symptoms. The treatment provided mild relief. Her past medical history is significant for bronchitis. There is no history of asthma, COPD or pneumonia.       Constitution: Positive for chills. Negative for fever.   HENT:  Positive for sore throat. Negative for postnasal drip.    Respiratory:  Positive for cough. Negative for shortness of breath and wheezing.    Skin:  Negative for rash.   Neurological:  Positive for headaches.      Objective:     Vitals:    12/01/23 1330   BP: (!) 145/84   BP Location: Right arm   Patient Position: Sitting   BP Method: Large (Automatic)   Pulse: 71   Resp: 19   Temp: 98.1 °F (36.7 °C)   TempSrc: Oral   SpO2: 97%   Weight: 81.6 kg (180 lb)   Height: 5' 5" (1.651 m)      Physical Exam   Constitutional: She is oriented to person, place, and time. She appears well-developed. She is cooperative.  Non-toxic appearance. She does not appear ill. No distress.   HENT:   Head: Normocephalic and atraumatic.   Ears:   Right Ear: Hearing, tympanic membrane, external ear and ear canal normal.   Left Ear: Hearing, tympanic membrane, external ear and ear canal normal. "   Nose: Congestion present. No mucosal edema, rhinorrhea or nasal deformity. No epistaxis. Right sinus exhibits no maxillary sinus tenderness and no frontal sinus tenderness. Left sinus exhibits no maxillary sinus tenderness and no frontal sinus tenderness.   Mouth/Throat: Uvula is midline, oropharynx is clear and moist and mucous membranes are normal. No trismus in the jaw. Normal dentition. No uvula swelling. No oropharyngeal exudate, posterior oropharyngeal edema or posterior oropharyngeal erythema.   Eyes: Conjunctivae and lids are normal. No scleral icterus.   Neck: Trachea normal and phonation normal. Neck supple. No edema present. No erythema present. No neck rigidity present.   Cardiovascular: Normal rate, regular rhythm, normal heart sounds and normal pulses.   Pulmonary/Chest: Effort normal and breath sounds normal. No respiratory distress. She has no decreased breath sounds. She has no rhonchi.   Abdominal: Normal appearance and bowel sounds are normal. Soft.   Musculoskeletal: Normal range of motion.         General: Normal range of motion.   Neurological: She is alert and oriented to person, place, and time. She exhibits normal muscle tone.   Skin: Skin is warm, intact, not diaphoretic and not pale.   Psychiatric: Her speech is normal and behavior is normal. Judgment and thought content normal.   Nursing note and vitals reviewed.    Results for orders placed or performed in visit on 12/01/23   SARS Coronavirus 2 Antigen, POCT Manual Read   Result Value Ref Range    SARS Coronavirus 2 Antigen Negative Negative     Acceptable Yes       Assessment:     1. URI with cough and congestion    2. Bacterial URI        Plan:       URI with cough and congestion  -     SARS Coronavirus 2 Antigen, POCT Manual Read    2. Bacterial URI  -     azithromycin (Z-JESSICA) 250 MG tablet; Take 2 tablets by mouth on day 1; Take 1 tablet by mouth on days 2-5  Dispense: 6 tablet; Refill: 0  -     levocetirizine  (XYZAL) 5 MG tablet; Take 1 tablet (5 mg total) by mouth every evening. May substitute for Zyrtec 10 mg daily if Xyzal is not affordable. for 14 days  Dispense: 14 tablet; Refill: 0  -     benzonatate (TESSALON) 200 MG capsule; Take 1 capsule (200 mg total) by mouth 3 (three) times daily as needed for Cough.  Dispense: 30 capsule; Refill: 0    Patient Instructions   Below are suggestions for symptomatic relief of your upper respiratory symptoms:              -Salt water gargles to soothe throat pain.              -Chloroseptic spray and Cepacol lozenges also help to numb throat pain.              -Warm herbal teas with honey/lemon/ginger can help soothe sore throat and hoarseness              -Nasal saline spray reduces inflammation and dryness.              -Warm face compresses to help with facial sinus pain/pressure.              -Humidifiers and steam can help with nasal dryness and congestion              -Vicks vapor rub at night for chest congestion.              -Flonase OTC or Nasacort OTC for nasal congestion and post-nasal drip. Ok to use twice daily for the first week, then reduce to once daily after symptoms have begun to improve.              -Afrin is a nasal spray that can give immediate relief of nasal congestion but you cannot use this medication for more than 3 days              -Simple foods like chicken noodle soup.              - Mucinex for congestion or Mucinex DM for cough during the day time. Delsym helps with coughing at night. Mucinex-D if you have sinus pressure/sinus pain or chest congestion. (caution if history of high blood pressure or palpitations). You must increase your water intake when using expectorants (Mucinex).             -Zyrtec/Claritin/Allegra/Xyzal should help with allergies.  -If you DO NOT have Hypertension or any history of palpitations, it is ok to take over the counter Sudafed or Mucinex D or Allegra-D or Claritin-D or Zyrtec-D.  -If you do take one of the above, it  is ok to combine that with plain over the counter Mucinex or Allegra or Claritin or Zyrtec. If, for example, you are taking Zyrtec -D, you can combine that with Mucinex, but not Mucinex-D.  If you are taking Mucinex-D, you can combine that with plain Allegra or Claritin or Zyrtec.   -If you DO have Hypertension or palpitations, it is safe to take Coricidin HBP for relief of sinus symptoms.

## 2023-12-12 ENCOUNTER — TELEPHONE (OUTPATIENT)
Dept: NEUROLOGY | Facility: CLINIC | Age: 68
End: 2023-12-12
Payer: MEDICARE

## 2023-12-12 NOTE — TELEPHONE ENCOUNTER
----- Message from Meggan Jones sent at 12/12/2023 12:43 PM CST -----  Contact: [phone]  RESCHEDULE  Pt is calling to reschedule her appt she missed on 12/11. No appt in  Epic pls call pt at [phone]

## 2024-01-23 ENCOUNTER — TELEPHONE (OUTPATIENT)
Dept: NEUROLOGY | Facility: CLINIC | Age: 69
End: 2024-01-23
Payer: MEDICARE

## 2024-01-23 NOTE — TELEPHONE ENCOUNTER
Called and spoke to pt and gave the next in-person appointment. Explained next in-person appointment is not until May and patient would like to schedule in-person appointment in May and be placed on the wait list.

## 2024-01-23 NOTE — TELEPHONE ENCOUNTER
----- Message from Angle Newell MA sent at 1/22/2024  4:17 PM CST -----  Regarding: FW: appt access  Contact: pt @ [phone]    ----- Message -----  From: Leonides Carr  Sent: 1/22/2024  12:34 PM CST  To: [facility] Staff  Subject: appt access                                      Pt cancelled virtual appt 01/25 and is requesting a call back to be reschedule for an in person appt. Please call to further advise pt. Thank you for all you are doing.

## 2024-02-07 ENCOUNTER — PATIENT MESSAGE (OUTPATIENT)
Dept: RESEARCH | Facility: HOSPITAL | Age: 69
End: 2024-02-07
Payer: MEDICARE

## 2024-02-17 ENCOUNTER — PATIENT MESSAGE (OUTPATIENT)
Dept: NEUROLOGY | Facility: CLINIC | Age: 69
End: 2024-02-17
Payer: MEDICARE

## 2024-02-19 ENCOUNTER — PATIENT MESSAGE (OUTPATIENT)
Dept: ADMINISTRATIVE | Facility: HOSPITAL | Age: 69
End: 2024-02-19
Payer: MEDICARE

## 2024-03-03 ENCOUNTER — OFFICE VISIT (OUTPATIENT)
Dept: URGENT CARE | Facility: CLINIC | Age: 69
End: 2024-03-03
Payer: MEDICARE

## 2024-03-03 VITALS
OXYGEN SATURATION: 98 % | RESPIRATION RATE: 16 BRPM | TEMPERATURE: 98 F | WEIGHT: 179.88 LBS | BODY MASS INDEX: 29.97 KG/M2 | SYSTOLIC BLOOD PRESSURE: 153 MMHG | HEART RATE: 92 BPM | HEIGHT: 65 IN | DIASTOLIC BLOOD PRESSURE: 84 MMHG

## 2024-03-03 DIAGNOSIS — J32.9 RHINOSINUSITIS: Primary | ICD-10-CM

## 2024-03-03 LAB
CTP QC/QA: YES
CTP QC/QA: YES
POC MOLECULAR INFLUENZA A AGN: NEGATIVE
POC MOLECULAR INFLUENZA B AGN: NEGATIVE
SARS-COV-2 AG RESP QL IA.RAPID: NEGATIVE

## 2024-03-03 PROCEDURE — 96372 THER/PROPH/DIAG INJ SC/IM: CPT | Mod: S$GLB,,, | Performed by: NURSE PRACTITIONER

## 2024-03-03 PROCEDURE — 87502 INFLUENZA DNA AMP PROBE: CPT | Mod: QW,S$GLB,, | Performed by: NURSE PRACTITIONER

## 2024-03-03 PROCEDURE — 87811 SARS-COV-2 COVID19 W/OPTIC: CPT | Mod: QW,S$GLB,, | Performed by: NURSE PRACTITIONER

## 2024-03-03 PROCEDURE — 99213 OFFICE O/P EST LOW 20 MIN: CPT | Mod: 25,S$GLB,, | Performed by: NURSE PRACTITIONER

## 2024-03-03 RX ORDER — MONTELUKAST SODIUM 10 MG/1
10 TABLET ORAL NIGHTLY
Qty: 30 TABLET | Refills: 0 | Status: SHIPPED | OUTPATIENT
Start: 2024-03-03 | End: 2024-04-02

## 2024-03-03 RX ORDER — BETAMETHASONE SODIUM PHOSPHATE AND BETAMETHASONE ACETATE 3; 3 MG/ML; MG/ML
6 INJECTION, SUSPENSION INTRA-ARTICULAR; INTRALESIONAL; INTRAMUSCULAR; SOFT TISSUE
Status: COMPLETED | OUTPATIENT
Start: 2024-03-03 | End: 2024-03-03

## 2024-03-03 RX ADMIN — BETAMETHASONE SODIUM PHOSPHATE AND BETAMETHASONE ACETATE 6 MG: 3; 3 INJECTION, SUSPENSION INTRA-ARTICULAR; INTRALESIONAL; INTRAMUSCULAR; SOFT TISSUE at 11:03

## 2024-03-03 NOTE — PROGRESS NOTES
"Subjective:      Patient ID: Brittany Modi is a 68 y.o. female.    Vitals:  height is 5' 5" (1.651 m) and weight is 81.6 kg (179 lb 14.3 oz). Her oral temperature is 98.4 °F (36.9 °C). Her blood pressure is 153/84 (abnormal) and her pulse is 92. Her respiration is 16 and oxygen saturation is 98%.     Chief Complaint: Cough (With a scratchy throat associated. )    Patient is a 68 y.o. female with the compliant of a scratchy throat and a cough that presented about 2 days ago. Patient reports with taking Allegra D for her current symptoms and re[orts with no relief.     Cough  This is a new problem. The current episode started in the past 7 days. The problem has been gradually worsening. The problem occurs every few minutes. The cough is Non-productive. Associated symptoms include myalgias, nasal congestion and postnasal drip. Pertinent negatives include no ear pain, fever or shortness of breath. Associated symptoms comments: Clogged ear sensation. Nothing aggravates the symptoms. Risk factors for lung disease include occupational exposure. Treatments tried: Allegra D. The treatment provided no relief. There is no history of asthma, bronchiectasis, bronchitis, COPD, emphysema, environmental allergies or pneumonia.       Constitution: Negative for fatigue and fever.   HENT:  Positive for postnasal drip. Negative for ear pain.    Neck: neck negative.   Cardiovascular: Negative.    Respiratory:  Positive for cough. Negative for shortness of breath.    Gastrointestinal: Negative.    Musculoskeletal:  Positive for muscle ache.   Allergic/Immunologic: Negative for environmental allergies.      Objective:     Physical Exam   Constitutional: No distress.   HENT:   Head: Normocephalic.   Ears:   Right Ear: Tympanic membrane, external ear and ear canal normal.   Left Ear: Tympanic membrane, external ear and ear canal normal.   Nose: Nose normal.   Mouth/Throat: Mucous membranes are moist. Oropharynx is clear. "   Cardiovascular: Normal rate and regular rhythm.   Pulmonary/Chest: Effort normal and breath sounds normal.   Abdominal: Normal appearance.   Neurological: She is alert.       Assessment:     1. Rhinosinusitis        Plan:       Rhinosinusitis  -     SARS Coronavirus 2 Antigen, POCT Manual Read  -     POCT Influenza A/B MOLECULAR  -     betamethasone acetate-betamethasone sodium phosphate injection 6 mg  -     montelukast (SINGULAIR) 10 mg tablet; Take 1 tablet (10 mg total) by mouth every evening.  Dispense: 30 tablet; Refill: 0      Results for orders placed or performed in visit on 03/03/24   SARS Coronavirus 2 Antigen, POCT Manual Read   Result Value Ref Range    SARS Coronavirus 2 Antigen Negative Negative     Acceptable Yes    POCT Influenza A/B MOLECULAR   Result Value Ref Range    POC Molecular Influenza A Ag Negative Negative, Not Reported    POC Molecular Influenza B Ag Negative Negative, Not Reported     Acceptable Yes      Patient Instructions   There are many treatment options for sinusitis, depending on your symptoms and how long youve had them. You can treat a sinus infection at home with:    Decongestants.  Over-the-counter (OTC) cold and allergy medications.  Nasal saline rinses.  Drinking plenty of fluids.  If symptoms of sinusitis dont improve after 10 days, a provider may prescribe:    Antibiotics.  Oral or topical decongestants.  Prescription intranasal steroid sprays. (Dont use nonprescription sprays or drops for longer than three to five days -- they may actually increase congestion.)  Providers treat chronic sinusitis by focusing on the underlying condition. Treatments can include:    Intranasal steroid sprays.  Topical antihistamine sprays or oral pills.  Leukotriene antagonists, like montelukast.  Surgery to treat structural issues, polyps or fungal infections

## 2024-03-06 ENCOUNTER — TELEPHONE (OUTPATIENT)
Dept: URGENT CARE | Facility: CLINIC | Age: 69
End: 2024-03-06
Payer: MEDICARE

## 2024-03-06 ENCOUNTER — PATIENT MESSAGE (OUTPATIENT)
Dept: URGENT CARE | Facility: CLINIC | Age: 69
End: 2024-03-06
Payer: MEDICARE

## 2024-03-06 DIAGNOSIS — J06.9 URI WITH COUGH AND CONGESTION: ICD-10-CM

## 2024-03-06 RX ORDER — DEXTROMETHORPHAN HBR AND GUAIFENESIN 5; 100 MG/5ML; MG/5ML
5 LIQUID ORAL
Qty: 118 ML | Refills: 0 | Status: SHIPPED | OUTPATIENT
Start: 2024-03-06

## 2024-03-06 RX ORDER — BENZONATATE 200 MG/1
200 CAPSULE ORAL 3 TIMES DAILY PRN
Qty: 30 CAPSULE | Refills: 0 | Status: SHIPPED | OUTPATIENT
Start: 2024-03-06 | End: 2024-03-16

## 2024-03-06 NOTE — TELEPHONE ENCOUNTER
Left message for patient to  medicine that was sent in for her.    ----- Message from Garrick Garcia PA-C sent at 3/6/2024 10:38 AM CST -----  Regarding: RE: Evans advice  Contact: Pt [phone]  Please let patient know I will send it in.    ----- Message -----  From: Elmo Banks MA  Sent: 3/6/2024   7:38 AM CST  To: #  Subject: FW: Evans advice                                     ----- Message -----  From: Sandie Garcia  Sent: 3/5/2024   2:12 PM CST  To: #  Subject: Evans advice                                       Patient came into the clinic on 03/03/2024 and still having the cough. She stated back in Dec, she was prescribed dextromethorphan-guaiFENesin 5-100 mg/5 mL Liqd and tessalon freddy. It had helped her better. She asked if those 2 RX can be prescribed instead. She ask for a call back.    [facility] #[id number] - [place], [place] - [address]  [facility] [address]  [facility] [address]  Phone: [phone] Fax: [phone]

## 2024-03-06 NOTE — TELEPHONE ENCOUNTER
--sent to pharmacy.        Patient called:  Patient came into the clinic on 03/03/2024 and still having the cough. She stated back in Dec, she was prescribed dextromethorphan-guaiFENesin 5-100 mg/5 mL Liqd and tessalon freddy. It had helped her better. She asked if those 2 RX can be prescribed instead. She ask for a call back.    [facility] #[id number] - [address] AT [facility] [address]  [facility]  [facility] [address]  Phone: [phone] Fax: [phone]

## 2024-03-15 ENCOUNTER — PATIENT OUTREACH (OUTPATIENT)
Dept: ADMINISTRATIVE | Facility: HOSPITAL | Age: 69
End: 2024-03-15
Payer: MEDICARE

## 2024-03-15 DIAGNOSIS — Z12.31 BREAST CANCER SCREENING BY MAMMOGRAM: Primary | ICD-10-CM

## 2024-03-15 NOTE — PROGRESS NOTES
Chart review done.  updated. Immunizations reviewed & updated. Care Everywhere updated. Campaign message follow up. Dexa and mammogram ordered. Patient only wants to schedule mammogram at this time.

## 2024-04-29 NOTE — PROGRESS NOTES
Name: Brittany Modi  MRN: [id number]   CSN: [id number]      Date: 04/30/2024    Referring physician:  No referring provider defined for this encounter.    Chief Complaint: tremor       Interval History:  - not sleeping well, urinating frequently   - cd/ld 1 tab TID -- still very helpful for tremors  7 - 2- 10   - takes melatonin 10 mg   - no falls but some unsteadiness whenever she goes from sitting to standing   - recently joined gym, doing water aerobics   - going 4 days a week   - no nausea with cd/ld   - headaches after she takes a dose of cd/ld   - BP has been stable   - constipation is an issue   - fiber was not helpful, daily miralax not helpful  - BM once a week or every other week   - drinking water, eating fruits and greens   - she was on linzess in the past, has seen GI in the past (last saw in 2022)   - some tightness in her neck with 1 tab TID but other benefits outweigh the side effects  - she can tell whenever she is due for a dose           From May 2023  - ldopa trial last visit   - accompanied by    - cd/ld 1 tab TID made tremors go away completely but caused nausea   - now back down to 1/2 tab TID, helping with tremors but still noticeable   - some hot flashes and sweating after she takes cd/ld   - tossing and turning, cannot sleep   - once she falls asleep she is good   - she takes melatonin 10 mg, takes right before she goes to bed   - no falls   - not much exercise   - feels like she has generalized body aches, improves some with ldopa (Even more so whenever she took the full tablet)  - she takes it on an empty stomach    - if she is late to take a dose, lasts about 8 hours   - tightness in the neck is better with ldopa, but with 1 tab felt like it was worse   - frequent urination at night         From Feb 2023 Brittany Modi is a R handed 68 y.o. female with a medical issues significant for GERD who presents for tremors. Referral from Dr. Dulce Elder and Dr. Edvin Love. First noticed  "tremor in May 2022. Gradual onset. First started off as a fine tremor, at times she didn't notice it. Other people would point it out. She notes that years ago (at least 10 years) she had a head tremor and was diagnosed with dystonia and has since resolved. Tremor started in left hand, only in the left hand. No tremor in the R hand. Denies tremor elsewhere. Notices tremor with rest, action and posture. Denies imbalance or falls. She does note that she has slowed down over the past year. She does not feel that she can suppress the tremor, although at times she has been able to stop tremor by grabbing her left hand with her right hand. Sometimes will raise her L arm above her head and it will momentarily stop but then it will re-emerge. She feels like something is around her neck, uncomfortable. Tremor is exacerbated by emotion and stress.     Does not exercise.     She tried inderal in the past but was not beneficial for this tremor.     Does not consume EtOH.       Retired RN.     Family History: none     Neuroleptic Exposure: none       From Jan 2023 with Dr. Elder  Interval history 01/09/2023   Ms Brittany Modi is a 66 y/o R handed F w PMH HTN presenting as follow up for L hand tremor, initially seen on 10/10/22. At that time her tremor was non rhythmic with varying amplitude. No Parkinsonian features noted and there was concern for FND given emotional trigger. She had MRI brain that was unremarkable.   Today she refers, that she is worse. Tremors are more severe, more debilitating. Still only on her left hand but patient it varies degree day to day. She can sometimes control it by holding it or lifting it up. She is also having finger tapping now. She says that the tremors are also influenced by her emotions; she says "if I watch an action movie it gets worse or if I get into an argument."  She refers that she is stressed due to the tremor, but can not think of any underlying stressor that could be provoking it. "  She is able to use the hand, like hold a glass.   She denies any vision changes, voice change, dysphagia, dysarthrtia, gate instability, ataxia, weakness.   She takes Vit B, C and D. Estro Ten for hot flashes.            Initial HPI:   Ms. Brittany Modi is a 67 y.o. R handed female w PMH HTN, constipation presenting as initial evaluation for left hand tremor. She was previously seen by Dr. Arreola and Dr. Arciniega (last 5/16/22) for memory problems and tremors, but she wants to change neurologist.   She is having involuntary movement on left hand, started 5 months, she says that now they are more vigorous and more often than it was, it is daily and constant, its worse at rest and subsided with action or lifting. Sami in May when she saw Dr Arreola they were finer movements and not as debilitating. She refers that if feels like her muscles feel tired and they are stiff. No aching, no pain. Weakness at the fingers and worsens at the end of day, some numbness but no tingling. She can slow it down if she holds it.. Triggers/exacerbated if she is upset/anxious. She refers that she has coordination/balance issues and she has not been able to walk a line for 1 month.   Denies any other medical issues other than HTN and recently constipation. No family history of neurodegenerative/movement disorders. Non smoker, no alcohol use    Nonmotor/Premotor ROS:  Anosmia: poor sense of smell   Dysarthria/Hypophonia: none   Dysphagia/Sialorrhea: none   Depression: yes, apathy   Cognitive slowing: memory is declining, speaking and forgets what she wants to say -- trouble with word finding   Hallucinations: none   Urinary changes: frequency at night   Constipation: chronic   Falls: none   Micrographia: none   Sleep issues:  -RBD: she has been told she talks in her sleep       Review of Systems:   Review of Systems   Constitutional:  Negative for chills, fever and malaise/fatigue.   HENT:  Negative for hearing loss.    Eyes:  Negative for  blurred vision and double vision.   Respiratory:  Negative for cough, shortness of breath and stridor.    Cardiovascular:  Negative for chest pain and leg swelling.   Gastrointestinal:  Positive for constipation. Negative for diarrhea and nausea.   Genitourinary:  Positive for frequency. Negative for urgency.   Musculoskeletal:  Negative for falls.   Skin:  Negative for itching and rash.   Neurological:  Positive for tremors. Negative for dizziness, loss of consciousness and weakness.   Psychiatric/Behavioral:  Negative for hallucinations and memory loss.            Past Medical History: The patient  has a past medical history of Arthritis, Chest pain, and GERD (gastroesophageal reflux disease).    Social History: The patient  reports that she has never smoked. She has never been exposed to tobacco smoke. She has never used smokeless tobacco. She reports that she does not drink alcohol and does not use drugs.    Family History: Their family history includes Asthma in her father; Breast cancer in her sister; Cancer in her sister; Diabetes in her mother; Hypertension in her brother, brother, mother, sister, sister, sister, and son; Hypothyroidism in her mother.    Allergies: Patient has no known allergies.     Meds:   Current Outpatient Medications on File Prior to Visit   Medication Sig Dispense Refill    dextromethorphan-guaiFENesin 5-100 mg/5 mL Liqd Take 5 mLs by mouth every 6 to 8 hours as needed (cough). 118 mL 0    levocetirizine (XYZAL) 5 MG tablet Take 1 tablet (5 mg total) by mouth every evening. May substitute for Zyrtec 10 mg daily if Xyzal is not affordable. for 14 days 14 tablet 0    multivitamin capsule Take 1 capsule by mouth once daily.      [DISCONTINUED] carbidopa-levodopa  mg (SINEMET)  mg per tablet Take 0.5 tablets by mouth 3 (three) times daily for 7 days, THEN 1 tablet 3 (three) times daily. 101 tablet 11     No current facility-administered medications on file prior to visit.  "      Exam:  BP (!) 147/79   Pulse 77   Ht 5' 5" (1.651 m)   Wt 83.9 kg (185 lb)   BMI 30.79 kg/m²     Constitutional  Well-developed, well-nourished, appears stated age   Ophthalmoscopic  No papilledema with no hemorrhages or exudates bilaterally   Cardiovascular  Radial pulses 2+ and symmetric, no LE edema bilaterally   Neurological    * Mental status  MOCA =      - Orientation  Oriented to person, place, time, and situation     - Memory   Intact recent and remote     - Attention/concentration  Attentive, vigilant during exam     - Language  Naming & repetition intact, +2-step commands     - Fund of knowledge  Aware of current events     - Executive  Well-organized thoughts     - Other     * Cranial nerves       - CN II  PERRL, visual fields full to confrontation     - CN III, IV, VI  Extraocular movements full, normal pursuits and saccades     - CN V  Sensation V1 - V3 intact     - CN VII  Face strong and symmetric bilaterally     - CN VIII  Hearing intact bilaterally     - CN IX, X  Palate raises midline and symmetric     - CN XI  SCM and trapezius 5/5 bilaterally     - CN XII  Tongue midline   * Motor  Muscle bulk normal, strength 5/5 throughout   * Sensory   Intact to light touch    * Coordination  No dysmetria with finger-to-nose or heel-to-shin   * Gait  See below.   * Deep tendon reflexes  3+ and symmetric throughout   Babinski downgoing bilaterally   * Specialized movement exam  No hypophonic speech.    No facial masking.   Mild L > R (only in the wrist on the R) cogwheel rigidity.     L bradykinesia with finger taps, finger flicks,    Resting tremor of L hand -- not distractible, persists with mental tasks  -- she is able to suppress the tremor at times however there is no variability in frequency or amplitude, there is no axis change     Tremor much improved today    Re-emerges with posture and gait eval -- improved    No other chorea, athetosis, myoclonus, or tics.   No motor impersistence.   " Normal-based gait.   No shortened stride length.   Decreased L arm swing    No postural instability.     Subtle head tremor, no-no -- dystonic      Laboratory/Radiological:  - Results:  Office Visit on 03/03/2024   Component Date Value Ref Range Status    SARS Coronavirus 2 Antigen 03/03/2024 Negative  Negative Final     Acceptable 03/03/2024 Yes   Final    POC Molecular Influenza A Ag 03/03/2024 Negative  Negative, Not Reported Final    POC Molecular Influenza B Ag 03/03/2024 Negative  Negative, Not Reported Final     Acceptable 03/03/2024 Yes   Final       - Independent review of images:      - Independent review of consultant's notes: Jael Love     ASSESSMENT/PLAN:  Parkinson's disease  - tremor-dominant, L sided, L > R cogwheel rigidity, L bradykinesia   - non motor features of alpha-synucleinopathy present -- anosmia, constipation, possible RBD  - currently on 1 tab TID  -- working on converting to rytary -- possibly 3 caps in the morning and 2 at other doses   (Side effects with 1 tab cd,ld but 1/2 tab is insufficient)   - avoid amantadine in setting of cognitive changes   - continue exercising     2. Constipation   - very infrequent BM  - has failed conservative treatment and linzess, has seen GI in the past   - rec'd following up with GI       3. Sleep disturbance   - referral to sleep medicine  - continue melatonin 10 mg qhs       Orders Placed This Encounter    Ambulatory referral/consult to Sleep Disorders    carbidopa-levodopa  mg (SINEMET)  mg per tablet           Follow up: in 3 months with [facility]     This is a patient with a serious and complex neurologic diagnosis whose overall, ongoing care is being managed and monitored by me and our Neurology clinic.   As such, since 2024,  is the appropriate add-on code to accompany the other E/M billing for this visit.      Collaborating Physician, Dr. Garvin, was available during today's encounter. Any change to  plan along with cosign to appear in the EMR.       Total time spent with the patient: 35 minutes.  26 minutes of face-to-face consultation and 9 minutes of chart review and coordination of care, on the day of the visit. This includes face to face time and non-face to face time preparing to see the patient (eg, review of tests), obtaining and/or reviewing separately obtained history, documenting clinical information in the electronic or other health record, independently interpreting resultsand communicating results to the patient/family/caregiver, or care coordination.         Mercedes Hameed PA-C   [facility]  Department of Neurology  Movement Disorders

## 2024-04-30 ENCOUNTER — OFFICE VISIT (OUTPATIENT)
Dept: NEUROLOGY | Facility: CLINIC | Age: 69
End: 2024-04-30
Payer: MEDICARE

## 2024-04-30 ENCOUNTER — LAB VISIT (OUTPATIENT)
Dept: LAB | Facility: HOSPITAL | Age: 69
End: 2024-04-30
Payer: MEDICARE

## 2024-04-30 VITALS
DIASTOLIC BLOOD PRESSURE: 79 MMHG | HEART RATE: 77 BPM | WEIGHT: 185 LBS | SYSTOLIC BLOOD PRESSURE: 147 MMHG | HEIGHT: 65 IN | BODY MASS INDEX: 30.82 KG/M2

## 2024-04-30 DIAGNOSIS — R41.3 MEMORY CHANGE: ICD-10-CM

## 2024-04-30 DIAGNOSIS — R25.1 TREMOR: ICD-10-CM

## 2024-04-30 DIAGNOSIS — G20.C PARKINSONISM, UNSPECIFIED PARKINSONISM TYPE: ICD-10-CM

## 2024-04-30 DIAGNOSIS — G47.9 SLEEP DISTURBANCE: ICD-10-CM

## 2024-04-30 DIAGNOSIS — G20.A2 PARKINSON'S DISEASE WITHOUT DYSKINESIA, WITH FLUCTUATING MANIFESTATIONS: Primary | ICD-10-CM

## 2024-04-30 DIAGNOSIS — Z71.89 COUNSELING REGARDING GOALS OF CARE: ICD-10-CM

## 2024-04-30 PROCEDURE — G2211 COMPLEX E/M VISIT ADD ON: HCPCS | Mod: HCNC,S$GLB,, | Performed by: PHYSICIAN ASSISTANT

## 2024-04-30 PROCEDURE — 1101F PT FALLS ASSESS-DOCD LE1/YR: CPT | Mod: HCNC,CPTII,S$GLB, | Performed by: PHYSICIAN ASSISTANT

## 2024-04-30 PROCEDURE — 1159F MED LIST DOCD IN RCRD: CPT | Mod: HCNC,CPTII,S$GLB, | Performed by: PHYSICIAN ASSISTANT

## 2024-04-30 PROCEDURE — 3078F DIAST BP <80 MM HG: CPT | Mod: HCNC,CPTII,S$GLB, | Performed by: PHYSICIAN ASSISTANT

## 2024-04-30 PROCEDURE — 1157F ADVNC CARE PLAN IN RCRD: CPT | Mod: HCNC,CPTII,S$GLB, | Performed by: PHYSICIAN ASSISTANT

## 2024-04-30 PROCEDURE — 82607 VITAMIN B-12: CPT | Mod: HCNC | Performed by: PHYSICIAN ASSISTANT

## 2024-04-30 PROCEDURE — 3008F BODY MASS INDEX DOCD: CPT | Mod: HCNC,CPTII,S$GLB, | Performed by: PHYSICIAN ASSISTANT

## 2024-04-30 PROCEDURE — 3077F SYST BP >= 140 MM HG: CPT | Mod: HCNC,CPTII,S$GLB, | Performed by: PHYSICIAN ASSISTANT

## 2024-04-30 PROCEDURE — 36415 COLL VENOUS BLD VENIPUNCTURE: CPT | Mod: HCNC | Performed by: PHYSICIAN ASSISTANT

## 2024-04-30 PROCEDURE — 1160F RVW MEDS BY RX/DR IN RCRD: CPT | Mod: HCNC,CPTII,S$GLB, | Performed by: PHYSICIAN ASSISTANT

## 2024-04-30 PROCEDURE — 99999 PR PBB SHADOW E&M-EST. PATIENT-LVL III: CPT | Mod: PBBFAC,HCNC,, | Performed by: PHYSICIAN ASSISTANT

## 2024-04-30 PROCEDURE — 3288F FALL RISK ASSESSMENT DOCD: CPT | Mod: HCNC,CPTII,S$GLB, | Performed by: PHYSICIAN ASSISTANT

## 2024-04-30 PROCEDURE — 99214 OFFICE O/P EST MOD 30 MIN: CPT | Mod: HCNC,S$GLB,, | Performed by: PHYSICIAN ASSISTANT

## 2024-04-30 PROCEDURE — 1126F AMNT PAIN NOTED NONE PRSNT: CPT | Mod: HCNC,CPTII,S$GLB, | Performed by: PHYSICIAN ASSISTANT

## 2024-04-30 RX ORDER — LEVODOPA AND CARBIDOPA 95; 23.75 MG/1; MG/1
71.25-285 CAPSULE, EXTENDED RELEASE ORAL 3 TIMES DAILY
Qty: 270 CAPSULE | Refills: 11 | Status: SHIPPED | OUTPATIENT
Start: 2024-04-30

## 2024-04-30 RX ORDER — CARBIDOPA AND LEVODOPA 25; 100 MG/1; MG/1
1 TABLET ORAL 3 TIMES DAILY
Qty: 270 TABLET | Refills: 3 | Status: SHIPPED | OUTPATIENT
Start: 2024-04-30

## 2024-04-30 NOTE — Clinical Note
Patient is asking for us to send something to [employer] chair and that her insurance will cover a portion of it.

## 2024-05-01 LAB — VIT B12 SERPL-MCNC: 695 NG/L (ref 180–914)

## 2024-05-08 ENCOUNTER — TELEPHONE (OUTPATIENT)
Dept: NEUROLOGY | Facility: CLINIC | Age: 69
End: 2024-05-08
Payer: MEDICARE

## 2024-05-08 NOTE — TELEPHONE ENCOUNTER
----- Message from Christin Harrison sent at 5/8/2024 11:49 AM CDT -----  Regarding: Advise  Contact: [phone]  Brittany Modi calling regarding Patient Advice (message) for # pt is calling to speak with nurse regarding a PA

## 2024-05-09 NOTE — TELEPHONE ENCOUNTER
Called pt to discuss PA for [employer] to pay for a medical chair.   She stated one was ordered by [facility] on 4/30.  Informed I would look into it and get back to her.

## 2024-05-13 ENCOUNTER — PATIENT MESSAGE (OUTPATIENT)
Dept: NEUROLOGY | Facility: CLINIC | Age: 69
End: 2024-05-13
Payer: MEDICARE

## 2024-05-13 DIAGNOSIS — Z74.09 IMPAIRED FUNCTIONAL MOBILITY, BALANCE, GAIT, AND ENDURANCE: ICD-10-CM

## 2024-05-13 DIAGNOSIS — K21.9 GASTROESOPHAGEAL REFLUX DISEASE, UNSPECIFIED WHETHER ESOPHAGITIS PRESENT: ICD-10-CM

## 2024-05-13 DIAGNOSIS — G20.C PARKINSONISM, UNSPECIFIED PARKINSONISM TYPE: Primary | ICD-10-CM

## 2024-05-14 ENCOUNTER — TELEPHONE (OUTPATIENT)
Dept: NEUROLOGY | Facility: CLINIC | Age: 69
End: 2024-05-14
Payer: MEDICARE

## 2024-05-14 NOTE — TELEPHONE ENCOUNTER
Received message from pt inquiring about a lift chair to help her sleep.  Called pt and left message. Provider informed me the pt had brought paperwork for the chair and it was given to a nurse who is currently on leave.  Asked pt to call back to discuss.

## 2024-05-14 NOTE — TELEPHONE ENCOUNTER
Placed order for lift chair and Obtained certificate of medical necessity for seat lift mechanisms. Need to speak to pt to see if she has a supplier_ name address telephone and get narrative description of the items for completion of the form. Including supplier's charge and medicare fee allowance.    Pt called back and stated she called [employer] and they have already agreed to pay for a portion of the chair.  She will call me back with information about the supplier of the chair, the description of the chair and the [employer] number to fax the information to.

## 2024-05-15 ENCOUNTER — TELEPHONE (OUTPATIENT)
Dept: NEUROLOGY | Facility: CLINIC | Age: 69
End: 2024-05-15
Payer: MEDICARE

## 2024-05-22 ENCOUNTER — PATIENT MESSAGE (OUTPATIENT)
Dept: NEUROLOGY | Facility: CLINIC | Age: 69
End: 2024-05-22
Payer: MEDICARE

## 2024-05-29 ENCOUNTER — TELEPHONE (OUTPATIENT)
Dept: NEUROLOGY | Facility: CLINIC | Age: 69
End: 2024-05-29
Payer: MEDICARE

## 2024-05-29 NOTE — TELEPHONE ENCOUNTER
Received message from [employer]      is calling to speak with someone in provider office regarding they are inquiring about the prior auth for the patient mobilel chair lift states you can fax to [phone] email to [employer] would be faster or call them back if you need assistance [phone] or reach out to the patient to let her know it was sent   Faxed order to [employer] at [phone].    Called pt to inform. She thanked me for the call.

## 2024-06-03 ENCOUNTER — TELEPHONE (OUTPATIENT)
Dept: NEUROLOGY | Facility: CLINIC | Age: 69
End: 2024-06-03
Payer: MEDICARE

## 2024-06-03 NOTE — TELEPHONE ENCOUNTER
----- Message from Keli Valdez MA sent at 5/29/2024  3:01 PM CDT -----  Regarding: FW: Timbo  Contact: [phone]    ----- Message -----  From: Ale Chester  Sent: 5/29/2024   2:33 PM CDT  To: Yoseph LUONG Staff  Subject: Timbo junior [facility] calling to speak with someone in provider office regarding Prior Authorization.  [facility] orders was faxed.  Please call back at [phone]      Fax Number : [phone]

## 2024-06-03 NOTE — TELEPHONE ENCOUNTER
Called [facility] to follow up on lift chair.  Spoke with . She states they never received the fax for medical necessity. Refaxed document.

## 2024-06-06 ENCOUNTER — OFFICE VISIT (OUTPATIENT)
Dept: INTERNAL MEDICINE | Facility: CLINIC | Age: 69
End: 2024-06-06
Payer: MEDICARE

## 2024-06-06 VITALS
RESPIRATION RATE: 17 BRPM | SYSTOLIC BLOOD PRESSURE: 120 MMHG | OXYGEN SATURATION: 98 % | BODY MASS INDEX: 31 KG/M2 | DIASTOLIC BLOOD PRESSURE: 72 MMHG | HEIGHT: 65 IN | WEIGHT: 186.06 LBS | HEART RATE: 80 BPM | TEMPERATURE: 97 F

## 2024-06-06 DIAGNOSIS — G20.C PARKINSONISM, UNSPECIFIED PARKINSONISM TYPE: ICD-10-CM

## 2024-06-06 DIAGNOSIS — K59.00 CONSTIPATION, UNSPECIFIED CONSTIPATION TYPE: ICD-10-CM

## 2024-06-06 DIAGNOSIS — M81.0 POSTMENOPAUSAL BONE LOSS: ICD-10-CM

## 2024-06-06 DIAGNOSIS — Z00.00 PREVENTATIVE HEALTH CARE: Primary | ICD-10-CM

## 2024-06-06 DIAGNOSIS — E66.9 CLASS 1 OBESITY WITH BODY MASS INDEX (BMI) OF 30.0 TO 30.9 IN ADULT, UNSPECIFIED OBESITY TYPE, UNSPECIFIED WHETHER SERIOUS COMORBIDITY PRESENT: ICD-10-CM

## 2024-06-06 PROCEDURE — 99397 PER PM REEVAL EST PAT 65+ YR: CPT | Mod: HCNC,S$GLB,, | Performed by: FAMILY MEDICINE

## 2024-06-06 PROCEDURE — 1157F ADVNC CARE PLAN IN RCRD: CPT | Mod: HCNC,CPTII,S$GLB, | Performed by: FAMILY MEDICINE

## 2024-06-06 PROCEDURE — 3074F SYST BP LT 130 MM HG: CPT | Mod: HCNC,CPTII,S$GLB, | Performed by: FAMILY MEDICINE

## 2024-06-06 PROCEDURE — 1101F PT FALLS ASSESS-DOCD LE1/YR: CPT | Mod: HCNC,CPTII,S$GLB, | Performed by: FAMILY MEDICINE

## 2024-06-06 PROCEDURE — 1159F MED LIST DOCD IN RCRD: CPT | Mod: HCNC,CPTII,S$GLB, | Performed by: FAMILY MEDICINE

## 2024-06-06 PROCEDURE — 3288F FALL RISK ASSESSMENT DOCD: CPT | Mod: HCNC,CPTII,S$GLB, | Performed by: FAMILY MEDICINE

## 2024-06-06 PROCEDURE — 3008F BODY MASS INDEX DOCD: CPT | Mod: HCNC,CPTII,S$GLB, | Performed by: FAMILY MEDICINE

## 2024-06-06 PROCEDURE — 3078F DIAST BP <80 MM HG: CPT | Mod: HCNC,CPTII,S$GLB, | Performed by: FAMILY MEDICINE

## 2024-06-06 PROCEDURE — 1126F AMNT PAIN NOTED NONE PRSNT: CPT | Mod: HCNC,CPTII,S$GLB, | Performed by: FAMILY MEDICINE

## 2024-06-06 PROCEDURE — 99999 PR PBB SHADOW E&M-EST. PATIENT-LVL V: CPT | Mod: PBBFAC,HCNC,, | Performed by: FAMILY MEDICINE

## 2024-06-06 PROCEDURE — 1160F RVW MEDS BY RX/DR IN RCRD: CPT | Mod: HCNC,CPTII,S$GLB, | Performed by: FAMILY MEDICINE

## 2024-06-06 NOTE — PROGRESS NOTES
Subjective     Patient ID: Brittany Modi is a 68 y.o. female.    Chief Complaint: Annual Exam, Weight Gain, and Constipation  68-year-old  female presents to clinic today for annual physical exam.  She is currently being followed by Neurology secondary to recently diagnosed Parkinson's disease.  She is currently stable on Sinemet.  She has been followed by GI in the past secondary to chronic constipation.  She was previously attempted on Linzess but reports no improvement of her symptoms.  At this time she continues to note chronic constipation.  She reports a past surgical history of appendectomy, tubal ligation, abdominoplasty, total abdominal hysterectomy, cystoscopy, and right total knee replacement.  She reports a strong family history of hypertension.  Her mother had hypertension and hypothyroidism.  Her sister had breast cancer.  Constipation  Pertinent negatives include no abdominal pain, back pain, diarrhea, difficulty urinating, fever, nausea or vomiting.     Review of Systems   Constitutional:  Positive for unexpected weight change. Negative for appetite change, chills, fatigue and fever.   HENT:  Negative for nasal congestion, ear pain, hearing loss, postnasal drip, rhinorrhea, sinus pressure/congestion, sore throat and tinnitus.    Eyes:  Negative for redness, itching and visual disturbance.   Respiratory:  Negative for cough, chest tightness and shortness of breath.    Cardiovascular:  Negative for chest pain and palpitations.   Gastrointestinal:  Positive for constipation. Negative for abdominal pain, diarrhea, nausea and vomiting.   Genitourinary:  Negative for decreased urine volume, difficulty urinating, dysuria, frequency, hematuria and urgency.   Musculoskeletal:  Negative for back pain, myalgias, neck pain and neck stiffness.   Integumentary:  Negative for rash.   Neurological:  Negative for dizziness, light-headedness and headaches.   Psychiatric/Behavioral: Negative.             Objective     Physical Exam  Vitals and nursing note reviewed.   Constitutional:       General: She is not in acute distress.     Appearance: She is well-developed. She is not diaphoretic.   HENT:      Head: Normocephalic and atraumatic.      Right Ear: External ear normal.      Left Ear: External ear normal.      Nose: Nose normal.      Mouth/Throat:      Pharynx: No oropharyngeal exudate.   Eyes:      General: No scleral icterus.        Right eye: No discharge.         Left eye: No discharge.      Conjunctiva/sclera: Conjunctivae normal.      Pupils: Pupils are equal, round, and reactive to light.   Neck:      Thyroid: No thyromegaly.      Vascular: No JVD.      Trachea: No tracheal deviation.   Cardiovascular:      Rate and Rhythm: Normal rate and regular rhythm.      Heart sounds: Normal heart sounds. No murmur heard.     No friction rub. No gallop.   Pulmonary:      Effort: Pulmonary effort is normal. No respiratory distress.      Breath sounds: Normal breath sounds. No stridor. No wheezing or rales.   Abdominal:      General: Bowel sounds are normal. There is no distension.      Palpations: Abdomen is soft. There is no mass.      Tenderness: There is no abdominal tenderness. There is no guarding or rebound.   Musculoskeletal:         General: No tenderness. Normal range of motion.      Cervical back: Normal range of motion and neck supple.   Lymphadenopathy:      Cervical: No cervical adenopathy.   Skin:     General: Skin is warm and dry.      Coloration: Skin is not pale.      Findings: No erythema or rash.   Neurological:      Mental Status: She is alert and oriented to person, place, and time.   Psychiatric:         Behavior: Behavior normal.         Thought Content: Thought content normal.         Judgment: Judgment normal.            Assessment and Plan     1. Preventative health care  -     CBC Auto Differential; Future; Expected date: 06/06/2024  -     Comprehensive Metabolic Panel; Future;  Expected date: 06/06/2024  -     Lipid Panel; Future; Expected date: 06/06/2024  -     T4, Free; Future; Expected date: 06/06/2024  -     TSH; Future; Expected date: 06/06/2024  -     Urinalysis, Reflex to Urine Culture Urine, Clean Catch; Future; Expected date: 06/06/2024    2. Parkinsonism, unspecified Parkinsonism type  -     CBC Auto Differential; Future; Expected date: 06/06/2024  -     Comprehensive Metabolic Panel; Future; Expected date: 06/06/2024  -     Lipid Panel; Future; Expected date: 06/06/2024  -     T4, Free; Future; Expected date: 06/06/2024  -     TSH; Future; Expected date: 06/06/2024  -     Urinalysis, Reflex to Urine Culture Urine, Clean Catch; Future; Expected date: 06/06/2024    3. Memory changes  -     CBC Auto Differential; Future; Expected date: 06/06/2024  -     Comprehensive Metabolic Panel; Future; Expected date: 06/06/2024  -     Lipid Panel; Future; Expected date: 06/06/2024  -     T4, Free; Future; Expected date: 06/06/2024  -     TSH; Future; Expected date: 06/06/2024  -     Urinalysis, Reflex to Urine Culture Urine, Clean Catch; Future; Expected date: 06/06/2024    4. Gastroesophageal reflux disease, unspecified whether esophagitis present  -     CBC Auto Differential; Future; Expected date: 06/06/2024  -     Comprehensive Metabolic Panel; Future; Expected date: 06/06/2024  -     Lipid Panel; Future; Expected date: 06/06/2024  -     T4, Free; Future; Expected date: 06/06/2024  -     TSH; Future; Expected date: 06/06/2024  -     Urinalysis, Reflex to Urine Culture Urine, Clean Catch; Future; Expected date: 06/06/2024    5. Constipation, unspecified constipation type  -     CBC Auto Differential; Future; Expected date: 06/06/2024  -     Comprehensive Metabolic Panel; Future; Expected date: 06/06/2024  -     Lipid Panel; Future; Expected date: 06/06/2024  -     T4, Free; Future; Expected date: 06/06/2024  -     TSH; Future; Expected date: 06/06/2024  -     Urinalysis, Reflex to Urine  Culture Urine, Clean Catch; Future; Expected date: 06/06/2024  -     Ambulatory referral/consult to Gastroenterology; Future; Expected date: 06/13/2024    6. Class 1 obesity with body mass index (BMI) of 30.0 to 30.9 in adult, unspecified obesity type, unspecified whether serious comorbidity present  -     CBC Auto Differential; Future; Expected date: 06/06/2024  -     Comprehensive Metabolic Panel; Future; Expected date: 06/06/2024  -     Lipid Panel; Future; Expected date: 06/06/2024  -     T4, Free; Future; Expected date: 06/06/2024  -     TSH; Future; Expected date: 06/06/2024  -     Urinalysis, Reflex to Urine Culture Urine, Clean Catch; Future; Expected date: 06/06/2024  -     Ambulatory referral/consult to Bariatric/Obesity Medicine; Future; Expected date: 06/13/2024    7. Postmenopausal bone loss  -     DXA Bone Density Axial Skeleton 1 or more sites; Future; Expected date: 06/06/2024        1. CBC, CMP, UA, TSH, free T4, and fasting lipids.  2. Continue Sinemet as prescribed and continue follow-up with neurology as scheduled.    3. Refer to GI for further evaluation of chronic constipation.    4. Refer to bariatric medicine for weight loss assistance.  5. DEXA bone scan.    6. Return to clinic as needed or in 1 year for annual physical exam.               Follow up in about 1 year (around 6/6/2025), or if symptoms worsen or fail to improve, for Annual exam.

## 2024-06-07 ENCOUNTER — PATIENT MESSAGE (OUTPATIENT)
Dept: NEUROLOGY | Facility: CLINIC | Age: 69
End: 2024-06-07
Payer: MEDICARE

## 2024-06-10 ENCOUNTER — LAB VISIT (OUTPATIENT)
Dept: LAB | Facility: HOSPITAL | Age: 69
End: 2024-06-10
Attending: FAMILY MEDICINE
Payer: MEDICARE

## 2024-06-10 DIAGNOSIS — Z00.00 PREVENTATIVE HEALTH CARE: ICD-10-CM

## 2024-06-10 DIAGNOSIS — G20.C PARKINSONISM, UNSPECIFIED PARKINSONISM TYPE: ICD-10-CM

## 2024-06-10 DIAGNOSIS — K59.00 CONSTIPATION, UNSPECIFIED CONSTIPATION TYPE: ICD-10-CM

## 2024-06-10 DIAGNOSIS — E66.9 CLASS 1 OBESITY WITH BODY MASS INDEX (BMI) OF 30.0 TO 30.9 IN ADULT, UNSPECIFIED OBESITY TYPE, UNSPECIFIED WHETHER SERIOUS COMORBIDITY PRESENT: ICD-10-CM

## 2024-06-10 LAB
ALBUMIN SERPL BCP-MCNC: 4.2 G/DL (ref 3.5–5.2)
ALP SERPL-CCNC: 71 U/L (ref 55–135)
ALT SERPL W/O P-5'-P-CCNC: 5 U/L (ref 10–44)
ANION GAP SERPL CALC-SCNC: 9 MMOL/L (ref 8–16)
AST SERPL-CCNC: 29 U/L (ref 10–40)
BASOPHILS # BLD AUTO: 0.04 K/UL (ref 0–0.2)
BASOPHILS NFR BLD: 0.9 % (ref 0–1.9)
BILIRUB SERPL-MCNC: 0.6 MG/DL (ref 0.1–1)
BUN SERPL-MCNC: 18 MG/DL (ref 8–23)
CALCIUM SERPL-MCNC: 9.8 MG/DL (ref 8.7–10.5)
CHLORIDE SERPL-SCNC: 107 MMOL/L (ref 95–110)
CHOLEST SERPL-MCNC: 176 MG/DL (ref 120–199)
CHOLEST/HDLC SERPL: 3.7 {RATIO} (ref 2–5)
CO2 SERPL-SCNC: 27 MMOL/L (ref 23–29)
CREAT SERPL-MCNC: 1.1 MG/DL (ref 0.5–1.4)
DIFFERENTIAL METHOD BLD: ABNORMAL
EOSINOPHIL # BLD AUTO: 0.1 K/UL (ref 0–0.5)
EOSINOPHIL NFR BLD: 1.5 % (ref 0–8)
ERYTHROCYTE [DISTWIDTH] IN BLOOD BY AUTOMATED COUNT: 13.1 % (ref 11.5–14.5)
EST. GFR  (NO RACE VARIABLE): 54.7 ML/MIN/1.73 M^2
GLUCOSE SERPL-MCNC: 94 MG/DL (ref 70–110)
HCT VFR BLD AUTO: 41.5 % (ref 37–48.5)
HDLC SERPL-MCNC: 48 MG/DL (ref 40–75)
HDLC SERPL: 27.3 % (ref 20–50)
HGB BLD-MCNC: 12.9 G/DL (ref 12–16)
IMM GRANULOCYTES # BLD AUTO: 0.01 K/UL (ref 0–0.04)
IMM GRANULOCYTES NFR BLD AUTO: 0.2 % (ref 0–0.5)
LDLC SERPL CALC-MCNC: 118.2 MG/DL (ref 63–159)
LYMPHOCYTES # BLD AUTO: 2.3 K/UL (ref 1–4.8)
LYMPHOCYTES NFR BLD: 48.7 % (ref 18–48)
MCH RBC QN AUTO: 31.1 PG (ref 27–31)
MCHC RBC AUTO-ENTMCNC: 31.1 G/DL (ref 32–36)
MCV RBC AUTO: 100 FL (ref 82–98)
MONOCYTES # BLD AUTO: 0.6 K/UL (ref 0.3–1)
MONOCYTES NFR BLD: 12.2 % (ref 4–15)
NEUTROPHILS # BLD AUTO: 1.7 K/UL (ref 1.8–7.7)
NEUTROPHILS NFR BLD: 36.5 % (ref 38–73)
NONHDLC SERPL-MCNC: 128 MG/DL
NRBC BLD-RTO: 0 /100 WBC
PLATELET # BLD AUTO: 277 K/UL (ref 150–450)
PMV BLD AUTO: 12.4 FL (ref 9.2–12.9)
POTASSIUM SERPL-SCNC: 3.6 MMOL/L (ref 3.5–5.1)
PROT SERPL-MCNC: 7.5 G/DL (ref 6–8.4)
RBC # BLD AUTO: 4.15 M/UL (ref 4–5.4)
SODIUM SERPL-SCNC: 143 MMOL/L (ref 136–145)
T4 FREE SERPL-MCNC: 0.94 NG/DL (ref 0.71–1.51)
TRIGL SERPL-MCNC: 49 MG/DL (ref 30–150)
TSH SERPL DL<=0.005 MIU/L-ACNC: 2.8 UIU/ML (ref 0.4–4)
WBC # BLD AUTO: 4.68 K/UL (ref 3.9–12.7)

## 2024-06-10 PROCEDURE — 85025 COMPLETE CBC W/AUTO DIFF WBC: CPT | Mod: HCNC | Performed by: FAMILY MEDICINE

## 2024-06-10 PROCEDURE — 80053 COMPREHEN METABOLIC PANEL: CPT | Mod: HCNC | Performed by: FAMILY MEDICINE

## 2024-06-10 PROCEDURE — 84439 ASSAY OF FREE THYROXINE: CPT | Mod: HCNC | Performed by: FAMILY MEDICINE

## 2024-06-10 PROCEDURE — 80061 LIPID PANEL: CPT | Mod: HCNC | Performed by: FAMILY MEDICINE

## 2024-06-10 PROCEDURE — 84443 ASSAY THYROID STIM HORMONE: CPT | Mod: HCNC | Performed by: FAMILY MEDICINE

## 2024-06-10 PROCEDURE — 36415 COLL VENOUS BLD VENIPUNCTURE: CPT | Mod: HCNC,PO | Performed by: FAMILY MEDICINE

## 2024-06-11 NOTE — TELEPHONE ENCOUNTER
[employer] MANAGED MEDICARE/[employer]        Group Number: [id number]       Subscriber Name: KERMIT LICONA Subscriber : [id number]   Subscriber ID: [id number]      Called [employer] at the number provided by patient   Answered questions regarding the date the order was sent,   Diagnosis code G 20.C and K 21.0  Provider name and NPI  [facility]  Supply code   Company name and NPI [id number]  [facility] [place] [address]    Insurance rep stated the company selected is not covered by pt's plan  Informed the rep that Pt had called ahead and was informed she would receive assistance with the payment.     The facility is out of network for the Insurance policy.  Procedure code 0636 How many units 1  PA started and pended   Need clinical notes, certificate of medical necessity, reference number, etc  faxed to [phone] done today    Reference number  [id number]   Turn around for PA is approx 10 days    Your fax has been successfully sent to  at [phone].

## 2024-06-24 ENCOUNTER — HOSPITAL ENCOUNTER (OUTPATIENT)
Dept: RADIOLOGY | Facility: HOSPITAL | Age: 69
Discharge: HOME | End: 2024-06-24
Attending: INTERNAL MEDICINE
Payer: MEDICARE

## 2024-06-24 DIAGNOSIS — Z12.31 BREAST CANCER SCREENING BY MAMMOGRAM: ICD-10-CM

## 2024-06-24 PROCEDURE — 77067 SCR MAMMO BI INCL CAD: CPT | Mod: TC,HCNC,PO

## 2024-07-07 ENCOUNTER — PATIENT MESSAGE (OUTPATIENT)
Dept: NEUROLOGY | Facility: CLINIC | Age: 69
End: 2024-07-07
Payer: MEDICARE

## 2024-07-18 ENCOUNTER — OFFICE VISIT (OUTPATIENT)
Dept: URGENT CARE | Facility: CLINIC | Age: 69
End: 2024-07-18
Payer: MEDICARE

## 2024-07-18 VITALS
WEIGHT: 186.06 LBS | TEMPERATURE: 101 F | SYSTOLIC BLOOD PRESSURE: 118 MMHG | BODY MASS INDEX: 31 KG/M2 | OXYGEN SATURATION: 95 % | HEIGHT: 65 IN | DIASTOLIC BLOOD PRESSURE: 77 MMHG | RESPIRATION RATE: 14 BRPM | HEART RATE: 97 BPM

## 2024-07-18 DIAGNOSIS — U07.1 COVID-19 VIRUS DETECTED: ICD-10-CM

## 2024-07-18 DIAGNOSIS — R50.81 FEVER IN OTHER DISEASES: Primary | ICD-10-CM

## 2024-07-18 DIAGNOSIS — J06.9 VIRAL URI WITH COUGH: ICD-10-CM

## 2024-07-18 DIAGNOSIS — U07.1 COVID: ICD-10-CM

## 2024-07-18 DIAGNOSIS — H92.03 REFERRED OTALGIA OF BOTH EARS: ICD-10-CM

## 2024-07-18 DIAGNOSIS — R52 BODY ACHES: ICD-10-CM

## 2024-07-18 LAB
CTP QC/QA: YES
SARS-COV-2 AG RESP QL IA.RAPID: POSITIVE

## 2024-07-18 PROCEDURE — 99214 OFFICE O/P EST MOD 30 MIN: CPT | Mod: S$GLB,,, | Performed by: FAMILY MEDICINE

## 2024-07-18 PROCEDURE — 87811 SARS-COV-2 COVID19 W/OPTIC: CPT | Mod: QW,S$GLB,, | Performed by: FAMILY MEDICINE

## 2024-07-18 RX ORDER — BENZONATATE 200 MG/1
200 CAPSULE ORAL 3 TIMES DAILY PRN
Qty: 30 CAPSULE | Refills: 0 | Status: SHIPPED | OUTPATIENT
Start: 2024-07-18 | End: 2024-07-28

## 2024-07-18 NOTE — PATIENT INSTRUCTIONS
Take tylenol for fever    Below are suggestions for symptomatic relief of your upper respiratory symptoms:              -Salt water gargles to soothe throat pain.              -Chloroseptic spray and Cepacol lozenges also help to numb throat pain.              -Warm herbal teas with honey/lemon/maria ines can help soothe sore throat and hoarseness              -Nasal saline spray reduces inflammation and dryness.              -Warm face compresses to help with facial sinus pain/pressure.              -Humidifiers and steam can help with nasal dryness and congestion              -Vicks vapor rub at night for chest congestion.              -Flonase OTC or Nasacort OTC for nasal congestion and post-nasal drip. Ok to use twice daily for the first week, then reduce to once daily after symptoms have begun to improve.              -Afrin is a nasal spray that can give immediate relief of nasal congestion but you cannot use this medication for more than 3 days              -Simple foods like chicken noodle soup.              - Mucinex for congestion or Mucinex DM for cough during the day time. Delsym helps with coughing at night. Mucinex-D if you have sinus pressure/sinus pain or chest congestion. (caution if history of high blood pressure or palpitations). You must increase your water intake when using expectorants (Mucinex).             -Zyrtec/Claritin/Allegra/Xyzal should help with allergies.  -If you DO NOT have Hypertension or any history of palpitations, it is ok to take over the counter Sudafed or Mucinex D or Allegra-D or Claritin-D or Zyrtec-D.  -If you do take one of the above, it is ok to combine that with plain over the counter Mucinex or Allegra or Claritin or Zyrtec. If, for example, you are taking Zyrtec -D, you can combine that with Mucinex, but not Mucinex-D.  If you are taking Mucinex-D, you can combine that with plain Allegra or Claritin or Zyrtec.   -If you DO have Hypertension or palpitations, it is safe  to take Coricidin HBP for relief of sinus symptoms.     If you test positive for COVID-19 you may return to normal activities when, for at least 24 hours, both are true:    Your symptoms are getting better overall, and:  You have not had a fever AND are not using fever reducing medication    The CDC also recommends added precautions in the 5 days after return to normal activity including frequent hand washing, mask wearing, physical distancing.      Seek immediate care in the emergency room in the event of severe abdominal pain, chest pain, respiratory distress, fever unresponsive to antipyretic, dehydration, loss of consciousness, seizure.

## 2024-07-18 NOTE — PROGRESS NOTES
"Subjective:      Patient ID: Brittany Modi is a 68 y.o. female.    Chief Complaint: Otalgia    Patient reports with c/o bilateral ear pain, body aches, weakness in the legs, fever, and a cough. Symptoms started on Sunday. Patient states she took allegra with some relief. Patient states her highest temp was 100.3 yesterday.    Otalgia   There is pain in both ears. This is a new problem. The current episode started in the past 7 days. The problem occurs constantly. The problem has been gradually worsening. There has been no fever. The pain is at a severity of 5/10. The pain is mild. Associated symptoms include abdominal pain, coughing, headaches, neck pain and a sore throat. Pertinent negatives include no diarrhea, ear discharge, hearing loss, rash, rhinorrhea or vomiting. Treatments tried: allegra. The treatment provided mild relief. There is no history of a chronic ear infection, hearing loss or a tympanostomy tube.       HENT:  Positive for ear pain and sore throat. Negative for ear discharge and hearing loss.    Neck: Positive for neck pain.   Respiratory:  Positive for cough.    Gastrointestinal:  Positive for abdominal pain. Negative for vomiting and diarrhea.   Skin:  Negative for rash.   Neurological:  Positive for headaches.      Objective:     Vitals:    07/18/24 1018   BP: 118/77   BP Location: Left arm   Patient Position: Sitting   BP Method: Medium (Automatic)   Pulse: 97   Resp: 14   Temp: (!) 100.9 °F (38.3 °C)   TempSrc: Oral   SpO2: 95%   Weight: 84.4 kg (186 lb 1.1 oz)   Height: 5' 5" (1.651 m)      Physical Exam   Constitutional: She is oriented to person, place, and time. She appears well-developed. She is cooperative.  Non-toxic appearance. She does not appear ill. No distress.   HENT:   Head: Normocephalic and atraumatic.   Ears:   Right Ear: Hearing, tympanic membrane, external ear and ear canal normal.   Left Ear: Hearing, tympanic membrane, external ear and ear canal normal.   Nose: Nose " normal. No mucosal edema, rhinorrhea or nasal deformity. No epistaxis. Right sinus exhibits no maxillary sinus tenderness and no frontal sinus tenderness. Left sinus exhibits no maxillary sinus tenderness and no frontal sinus tenderness.   Mouth/Throat: Uvula is midline and mucous membranes are normal. No trismus in the jaw. Normal dentition. No uvula swelling. Posterior oropharyngeal erythema present. No oropharyngeal exudate or posterior oropharyngeal edema.   Eyes: Conjunctivae and lids are normal. No scleral icterus.   Neck: Trachea normal and phonation normal. Neck supple. No edema present. No erythema present. No neck rigidity present.   Cardiovascular: Normal rate, regular rhythm, normal heart sounds and normal pulses.   Pulmonary/Chest: Effort normal and breath sounds normal. No respiratory distress. She has no decreased breath sounds. She has no rhonchi.   Abdominal: Normal appearance and bowel sounds are normal. She exhibits no distension. Soft. There is no abdominal tenderness. There is no rebound.   Musculoskeletal: Normal range of motion.         General: Normal range of motion.   Lymphadenopathy:     She has no cervical adenopathy.   Neurological: She is alert and oriented to person, place, and time. She exhibits normal muscle tone.   Skin: Skin is warm, intact, not diaphoretic and not pale. Capillary refill takes less than 2 seconds.   Psychiatric: Her speech is normal and behavior is normal. Judgment and thought content normal.   Nursing note and vitals reviewed.    Results for orders placed or performed in visit on 07/18/24   SARS Coronavirus 2 Antigen, POCT Manual Read   Result Value Ref Range    SARS Coronavirus 2 Antigen Positive (A) Negative     Acceptable Yes         Assessment:     1. Fever in other diseases    2. Referred otalgia of both ears    3. Body aches    4. Viral URI with cough    5. COVID        Plan:       Fever in other diseases  -     SARS Coronavirus 2 Antigen,  POCT Manual Read    2. Referred otalgia of both ears  -     SARS Coronavirus 2 Antigen, POCT Manual Read    3. Body aches  -     SARS Coronavirus 2 Antigen, POCT Manual Read    4. Viral URI with cough  -     benzonatate (TESSALON) 200 MG capsule; Take 1 capsule (200 mg total) by mouth 3 (three) times daily as needed for Cough.  Dispense: 30 capsule; Refill: 0    5. COVID  -     nirmatrelvir-ritonavir 150-100 mg DsPk; Take 2 tablets by mouth 2 (two) times daily for 5 days. Each dose contains 1 nirmatrelvir (pink tablet) and 1 ritonavir (white tablet). Take both tablets together  Dispense: 20 tablet; Refill: 0    Patient Instructions   Take tylenol for fever    Below are suggestions for symptomatic relief of your upper respiratory symptoms:              -Salt water gargles to soothe throat pain.              -Chloroseptic spray and Cepacol lozenges also help to numb throat pain.              -Warm herbal teas with honey/lemon/ginger can help soothe sore throat and hoarseness              -Nasal saline spray reduces inflammation and dryness.              -Warm face compresses to help with facial sinus pain/pressure.              -Humidifiers and steam can help with nasal dryness and congestion              -Vicks vapor rub at night for chest congestion.              -Flonase OTC or Nasacort OTC for nasal congestion and post-nasal drip. Ok to use twice daily for the first week, then reduce to once daily after symptoms have begun to improve.              -Afrin is a nasal spray that can give immediate relief of nasal congestion but you cannot use this medication for more than 3 days              -Simple foods like chicken noodle soup.              - Mucinex for congestion or Mucinex DM for cough during the day time. Delsym helps with coughing at night. Mucinex-D if you have sinus pressure/sinus pain or chest congestion. (caution if history of high blood pressure or palpitations). You must increase your water intake when  using expectorants (Mucinex).             -Zyrtec/Claritin/Allegra/Xyzal should help with allergies.  -If you DO NOT have Hypertension or any history of palpitations, it is ok to take over the counter Sudafed or Mucinex D or Allegra-D or Claritin-D or Zyrtec-D.  -If you do take one of the above, it is ok to combine that with plain over the counter Mucinex or Allegra or Claritin or Zyrtec. If, for example, you are taking Zyrtec -D, you can combine that with Mucinex, but not Mucinex-D.  If you are taking Mucinex-D, you can combine that with plain Allegra or Claritin or Zyrtec.   -If you DO have Hypertension or palpitations, it is safe to take Coricidin HBP for relief of sinus symptoms.     If you test positive for COVID-19 you may return to normal activities when, for at least 24 hours, both are true:    Your symptoms are getting better overall, and:  You have not had a fever AND are not using fever reducing medication    The CDC also recommends added precautions in the 5 days after return to normal activity including frequent hand washing, mask wearing, physical distancing.      Seek immediate care in the emergency room in the event of severe abdominal pain, chest pain, respiratory distress, fever unresponsive to antipyretic, dehydration, loss of consciousness, seizure.

## 2024-07-22 NOTE — PROGRESS NOTES
Name: Brittany Modi  MRN: [id number]   CSN: [id number]      Date: 07/23/2024    Referring physician:  No referring provider defined for this encounter.    Chief Complaint: tremor       Interval History:  - cd/ld 1 tab TID  - restless leg movements at night, not relieved by levodopa   - urge to move legs in the evening   - 7-2-11 pm   - no falls since last visit   - never received rytary   - has to get up and walk because of uncomfortable feeling in her legs   - drinks pickle juice which helps sometimes   - going to the gym 6-7 times a week, does water aerobics 4 times a week         April 2024  - not sleeping well, urinating frequently   - cd/ld 1 tab TID -- still very helpful for tremors  7 - 2- 10   - takes melatonin 10 mg   - no falls but some unsteadiness whenever she goes from sitting to standing   - recently joined gym, doing water aerobics   - going 4 days a week   - no nausea with cd/ld   - headaches after she takes a dose of cd/ld   - BP has been stable   - constipation is an issue   - fiber was not helpful, daily miralax not helpful  - BM once a week or every other week   - drinking water, eating fruits and greens   - she was on linzess in the past, has seen GI in the past (last saw in 2022)   - some tightness in her neck with 1 tab TID but other benefits outweigh the side effects  - she can tell whenever she is due for a dose           From May 2023  - ldopa trial last visit   - accompanied by    - cd/ld 1 tab TID made tremors go away completely but caused nausea   - now back down to 1/2 tab TID, helping with tremors but still noticeable   - some hot flashes and sweating after she takes cd/ld   - tossing and turning, cannot sleep   - once she falls asleep she is good   - she takes melatonin 10 mg, takes right before she goes to bed   - no falls   - not much exercise   - feels like she has generalized body aches, improves some with ldopa (Even more so whenever she took the full tablet)  - she takes  it on an empty stomach    - if she is late to take a dose, lasts about 8 hours   - tightness in the neck is better with ldopa, but with 1 tab felt like it was worse   - frequent urination at night         From Feb 2023 Brittany Modi is a R handed 69 y.o. female with a medical issues significant for GERD who presents for tremors. Referral from Dr. Dulce Elder and Dr. Edvin Love. First noticed tremor in May 2022. Gradual onset. First started off as a fine tremor, at times she didn't notice it. Other people would point it out. She notes that years ago (at least 10 years) she had a head tremor and was diagnosed with dystonia and has since resolved. Tremor started in left hand, only in the left hand. No tremor in the R hand. Denies tremor elsewhere. Notices tremor with rest, action and posture. Denies imbalance or falls. She does note that she has slowed down over the past year. She does not feel that she can suppress the tremor, although at times she has been able to stop tremor by grabbing her left hand with her right hand. Sometimes will raise her L arm above her head and it will momentarily stop but then it will re-emerge. She feels like something is around her neck, uncomfortable. Tremor is exacerbated by emotion and stress.     Does not exercise.     She tried inderal in the past but was not beneficial for this tremor.     Does not consume EtOH.       Retired RN.     Family History: none     Neuroleptic Exposure: none       From Jan 2023 with Dr. Elder  Interval history 01/09/2023   Ms Brittany Modi is a 66 y/o R handed F w PMH HTN presenting as follow up for L hand tremor, initially seen on 10/10/22. At that time her tremor was non rhythmic with varying amplitude. No Parkinsonian features noted and there was concern for FND given emotional trigger. She had MRI brain that was unremarkable.   Today she refers, that she is worse. Tremors are more severe, more debilitating. Still only on her left hand but patient  "it varies degree day to day. She can sometimes control it by holding it or lifting it up. She is also having finger tapping now. She says that the tremors are also influenced by her emotions; she says "if I watch an action movie it gets worse or if I get into an argument."  She refers that she is stressed due to the tremor, but can not think of any underlying stressor that could be provoking it.  She is able to use the hand, like hold a glass.   She denies any vision changes, voice change, dysphagia, dysarthrtia, gate instability, ataxia, weakness.   She takes Vit B, C and D. Estro Ten for hot flashes.            Initial HPI:   Ms. Brittany Modi is a 67 y.o. R handed female w PMH HTN, constipation presenting as initial evaluation for left hand tremor. She was previously seen by Dr. Arreola and Dr. Arciniega (last 5/16/22) for memory problems and tremors, but she wants to change neurologist.   She is having involuntary movement on left hand, started 5 months, she says that now they are more vigorous and more often than it was, it is daily and constant, its worse at rest and subsided with action or lifting. Sami in May when she saw Dr Arreola they were finer movements and not as debilitating. She refers that if feels like her muscles feel tired and they are stiff. No aching, no pain. Weakness at the fingers and worsens at the end of day, some numbness but no tingling. She can slow it down if she holds it.. Triggers/exacerbated if she is upset/anxious. She refers that she has coordination/balance issues and she has not been able to walk a line for 1 month.   Denies any other medical issues other than HTN and recently constipation. No family history of neurodegenerative/movement disorders. Non smoker, no alcohol use    Nonmotor/Premotor ROS:  Anosmia: poor sense of smell   Dysarthria/Hypophonia: none   Dysphagia/Sialorrhea: none   Depression: yes, apathy   Cognitive slowing: memory is declining, speaking and forgets what " she wants to say -- trouble with word finding   Hallucinations: none   Urinary changes: frequency at night   Constipation: chronic   Falls: none   Micrographia: none   Sleep issues:  -RBD: she has been told she talks in her sleep       Review of Systems:   Review of Systems   Constitutional:  Negative for chills, fever and malaise/fatigue.   HENT:  Negative for hearing loss.    Eyes:  Negative for blurred vision and double vision.   Respiratory:  Negative for cough, shortness of breath and stridor.    Cardiovascular:  Negative for chest pain and leg swelling.   Gastrointestinal:  Positive for constipation. Negative for diarrhea and nausea.   Genitourinary:  Positive for frequency. Negative for urgency.   Musculoskeletal:  Negative for falls.   Skin:  Negative for itching and rash.   Neurological:  Positive for tremors. Negative for dizziness, loss of consciousness and weakness.   Psychiatric/Behavioral:  Negative for hallucinations and memory loss.            Past Medical History: The patient  has a past medical history of Arthritis, Chest pain, and GERD (gastroesophageal reflux disease).    Social History: The patient  reports that she has never smoked. She has never been exposed to tobacco smoke. She has never used smokeless tobacco. She reports that she does not drink alcohol and does not use drugs.    Family History: Their family history includes Asthma in her father; Breast cancer in her sister; Cancer in her sister; Diabetes in her mother; Hypertension in her brother, brother, mother, sister, sister, sister, and son; Hypothyroidism in her mother.    Allergies: Patient has no known allergies.     Meds:   Current Outpatient Medications on File Prior to Visit   Medication Sig Dispense Refill    benzonatate (TESSALON) 200 MG capsule Take 1 capsule (200 mg total) by mouth 3 (three) times daily as needed for Cough. 30 capsule 0    carbidopa-levodopa  mg (SINEMET)  mg per tablet Take 1 tablet by mouth 3  "(three) times daily. 270 tablet 3    dextromethorphan-guaiFENesin 5-100 mg/5 mL Liqd Take 5 mLs by mouth every 6 to 8 hours as needed (cough). (Patient not taking: Reported on 6/6/2024) 118 mL 0    levocetirizine (XYZAL) 5 MG tablet Take 1 tablet (5 mg total) by mouth every evening. May substitute for Zyrtec 10 mg daily if Xyzal is not affordable. for 14 days 14 tablet 0    multivitamin capsule Take 1 capsule by mouth once daily. (Patient not taking: Reported on 7/18/2024)      nirmatrelvir-ritonavir 150-100 mg DsPk Take 2 tablets by mouth 2 (two) times daily for 5 days. Each dose contains 1 nirmatrelvir (pink tablet) and 1 ritonavir (white tablet). Take both tablets together 20 tablet 0    [DISCONTINUED] carbidopa-levodopa (RYTARY) 23.75-95 mg CpSR Take 71. mg by mouth 3 (three) times daily. (3 capsules at a time, three times per day) 270 capsule 11     No current facility-administered medications on file prior to visit.       Exam:  BP (!) 160/83   Pulse 87   Ht 5' 5" (1.651 m)   Wt 81.6 kg (180 lb)   BMI 29.95 kg/m²     Constitutional  Well-developed, well-nourished, appears stated age   Ophthalmoscopic  No papilledema with no hemorrhages or exudates bilaterally   Cardiovascular  Radial pulses 2+ and symmetric, no LE edema bilaterally   Neurological    * Mental status  MOCA =      - Orientation  Oriented to person, place, time, and situation     - Memory   Intact recent and remote     - Attention/concentration  Attentive, vigilant during exam     - Language  Naming & repetition intact, +2-step commands     - Fund of knowledge  Aware of current events     - Executive  Well-organized thoughts     - Other     * Cranial nerves       - CN II  PERRL, visual fields full to confrontation     - CN III, IV, VI  Extraocular movements full, normal pursuits and saccades     - CN V  Sensation V1 - V3 intact     - CN VII  Face strong and symmetric bilaterally     - CN VIII  Hearing intact bilaterally     - CN IX, X  " Palate raises midline and symmetric     - CN XI  SCM and trapezius 5/5 bilaterally     - CN XII  Tongue midline   * Motor  Muscle bulk normal, strength 5/5 throughout   * Sensory   Intact to light touch    * Coordination  No dysmetria with finger-to-nose or heel-to-shin   * Gait  See below.   * Deep tendon reflexes  3+ and symmetric throughout   Babinski downgoing bilaterally   * Specialized movement exam  No hypophonic speech.    No facial masking.   Mild L > R (only in the wrist on the R) cogwheel rigidity.     L bradykinesia with finger taps, finger flicks,    Resting tremor of L hand -- not distractible, persists with mental tasks  -- she is able to suppress the tremor at times however there is no variability in frequency or amplitude, there is no axis change     Tremor much improved today    Re-emerges with posture and gait eval -- improved    No other chorea, athetosis, myoclonus, or tics.   No motor impersistence.   Normal-based gait.   No shortened stride length.   Decreased L arm swing    No postural instability.     Subtle head tremor, no-no -- dystonic      Laboratory/Radiological:  - Results:  Office Visit on 07/18/2024   Component Date Value Ref Range Status    SARS Coronavirus 2 Antigen 07/18/2024 Positive (A)  Negative Final     Acceptable 07/18/2024 Yes   Final   Lab Visit on 06/10/2024   Component Date Value Ref Range Status    WBC 06/10/2024 4.68  3.90 - 12.70 K/uL Final    RBC 06/10/2024 4.15  4.00 - 5.40 M/uL Final    Hemoglobin 06/10/2024 12.9  12.0 - 16.0 g/dL Final    Hematocrit 06/10/2024 41.5  37.0 - 48.5 % Final    MCV 06/10/2024 100 (H)  82 - 98 fL Final    MCH 06/10/2024 31.1 (H)  27.0 - 31.0 pg Final    MCHC 06/10/2024 31.1 (L)  32.0 - 36.0 g/dL Final    RDW 06/10/2024 13.1  11.5 - 14.5 % Final    Platelets 06/10/2024 277  150 - 450 K/uL Final    MPV 06/10/2024 12.4  9.2 - 12.9 fL Final    Immature Granulocytes 06/10/2024 0.2  0.0 - 0.5 % Final    Gran # (ANC) 06/10/2024  1.7 (L)  1.8 - 7.7 K/uL Final    Immature Grans (Abs) 06/10/2024 0.01  0.00 - 0.04 K/uL Final    Lymph # 06/10/2024 2.3  1.0 - 4.8 K/uL Final    Mono # 06/10/2024 0.6  0.3 - 1.0 K/uL Final    Eos # 06/10/2024 0.1  0.0 - 0.5 K/uL Final    Baso # 06/10/2024 0.04  0.00 - 0.20 K/uL Final    nRBC 06/10/2024 0  0 /100 WBC Final    Gran % 06/10/2024 36.5 (L)  38.0 - 73.0 % Final    Lymph % 06/10/2024 48.7 (H)  18.0 - 48.0 % Final    Mono % 06/10/2024 12.2  4.0 - 15.0 % Final    Eosinophil % 06/10/2024 1.5  0.0 - 8.0 % Final    Basophil % 06/10/2024 0.9  0.0 - 1.9 % Final    Differential Method 06/10/2024 Automated   Final    Sodium 06/10/2024 143  136 - 145 mmol/L Final    Potassium 06/10/2024 3.6  3.5 - 5.1 mmol/L Final    Chloride 06/10/2024 107  95 - 110 mmol/L Final    CO2 06/10/2024 27  23 - 29 mmol/L Final    Glucose 06/10/2024 94  70 - 110 mg/dL Final    BUN 06/10/2024 18  8 - 23 mg/dL Final    Creatinine 06/10/2024 1.1  0.5 - 1.4 mg/dL Final    Calcium 06/10/2024 9.8  8.7 - 10.5 mg/dL Final    Total Protein 06/10/2024 7.5  6.0 - 8.4 g/dL Final    Albumin 06/10/2024 4.2  3.5 - 5.2 g/dL Final    Total Bilirubin 06/10/2024 0.6  0.1 - 1.0 mg/dL Final    Alkaline Phosphatase 06/10/2024 71  55 - 135 U/L Final    AST 06/10/2024 29  10 - 40 U/L Final    ALT 06/10/2024 5 (L)  10 - 44 U/L Final    eGFR 06/10/2024 54.7 (A)  >60 mL/min/1.73 m^2 Final    Anion Gap 06/10/2024 9  8 - 16 mmol/L Final    Cholesterol 06/10/2024 176  120 - 199 mg/dL Final    Triglycerides 06/10/2024 49  30 - 150 mg/dL Final    HDL 06/10/2024 48  40 - 75 mg/dL Final    LDL Cholesterol 06/10/2024 118.2  63.0 - 159.0 mg/dL Final    HDL/Cholesterol Ratio 06/10/2024 27.3  20.0 - 50.0 % Final    Total Cholesterol/HDL Ratio 06/10/2024 3.7  2.0 - 5.0 Final    Non-HDL Cholesterol 06/10/2024 128  mg/dL Final    Free T4 06/10/2024 0.94  0.71 - 1.51 ng/dL Final    TSH 06/10/2024 2.800  0.400 - 4.000 uIU/mL Final   Lab Visit on 06/10/2024   Component Date Value  Ref Range Status    Specimen UA 06/10/2024 Urine, Clean Catch   Final    Color, UA 06/10/2024 Yellow  Yellow, Straw, Jyoti Final    Appearance, UA 06/10/2024 Clear  Clear Final    pH, UA 06/10/2024 6.0  5.0 - 8.0 Final    Specific Gravity, UA 06/10/2024 1.030  1.005 - 1.030 Final    Protein, UA 06/10/2024 Trace (A)  Negative Final    Glucose, UA 06/10/2024 Negative  Negative Final    Ketones, UA 06/10/2024 Negative  Negative Final    Bilirubin (UA) 06/10/2024 Negative  Negative Final    Occult Blood UA 06/10/2024 Trace (A)  Negative Final    Nitrite, UA 06/10/2024 Negative  Negative Final    Leukocytes, UA 06/10/2024 Negative  Negative Final   Lab Visit on 04/30/2024   Component Date Value Ref Range Status    Vitamin B-12 04/30/2024 695  180 - 914 ng/L Final       - Independent review of images:      - Independent review of consultant's notes: Jael Love     ASSESSMENT/PLAN:  Parkinson's disease  - tremor-dominant, L sided, L > R cogwheel rigidity, L bradykinesia   - non motor features of alpha-synucleinopathy present -- anosmia, constipation, possible RBD  - currently on 1 tab TID  -- working on converting to rytary -- possibly 3 caps in the morning and 2 at other doses   (Side effects with 1 tab cd,ld but 1/2 tab is insufficient)   - avoid amantadine in setting of cognitive changes   - continue exercising     2. Constipation   - very infrequent BM  - has failed conservative treatment and linzess, has seen GI in the past   - rec'd following up with GI       3. Restless leg syndrome  - checking iron levels   - consider trial of dopamine agonist vs gabapentin       Orders Placed This Encounter    IRON AND TIBC    Ferritin    carbidopa-levodopa (RYTARY) 23.75-95 mg CpSR           Follow up: in 3 months with [facility]     This is a patient with a serious and complex neurologic diagnosis whose overall, ongoing care is being managed and monitored by me and our Neurology clinic.   As such, since 2024,  is the appropriate  add-on code to accompany the other E/M billing for this visit.      Collaborating Physician, Dr. Garvin, was available during today's encounter. Any change to plan along with cosign to appear in the EMR.       Total time spent with the patient: 31 minutes.  21 minutes of face-to-face consultation and 10 minutes of chart review and coordination of care, on the day of the visit. This includes face to face time and non-face to face time preparing to see the patient (eg, review of tests), obtaining and/or reviewing separately obtained history, documenting clinical information in the electronic or other health record, independently interpreting resultsand communicating results to the patient/family/caregiver, or care coordination.         Mercedes Hameed PA-C   [facility]  Department of Neurology  Movement Disorders

## 2024-07-23 ENCOUNTER — OFFICE VISIT (OUTPATIENT)
Dept: NEUROLOGY | Facility: CLINIC | Age: 69
End: 2024-07-23
Payer: MEDICARE

## 2024-07-23 ENCOUNTER — LAB VISIT (OUTPATIENT)
Dept: LAB | Facility: HOSPITAL | Age: 69
End: 2024-07-23
Payer: MEDICARE

## 2024-07-23 ENCOUNTER — PATIENT MESSAGE (OUTPATIENT)
Dept: NEUROLOGY | Facility: CLINIC | Age: 69
End: 2024-07-23

## 2024-07-23 VITALS
SYSTOLIC BLOOD PRESSURE: 160 MMHG | HEART RATE: 87 BPM | DIASTOLIC BLOOD PRESSURE: 83 MMHG | HEIGHT: 65 IN | BODY MASS INDEX: 29.99 KG/M2 | WEIGHT: 180 LBS

## 2024-07-23 DIAGNOSIS — G25.81 RESTLESS LEG SYNDROME: ICD-10-CM

## 2024-07-23 DIAGNOSIS — E61.1 IRON DEFICIENCY ASSOCIATED WITH NONFAMILIAL RESTLESS LEGS SYNDROME: ICD-10-CM

## 2024-07-23 DIAGNOSIS — G20.A2 PARKINSON'S DISEASE WITHOUT DYSKINESIA, WITH FLUCTUATING MANIFESTATIONS: Primary | ICD-10-CM

## 2024-07-23 DIAGNOSIS — Z71.89 COUNSELING REGARDING GOALS OF CARE: ICD-10-CM

## 2024-07-23 DIAGNOSIS — G25.81 IRON DEFICIENCY ASSOCIATED WITH NONFAMILIAL RESTLESS LEGS SYNDROME: ICD-10-CM

## 2024-07-23 DIAGNOSIS — R41.3 MEMORY CHANGE: ICD-10-CM

## 2024-07-23 LAB
FERRITIN SERPL-MCNC: 300 NG/ML (ref 20–300)
IRON SERPL-MCNC: 97 UG/DL (ref 30–160)
SATURATED IRON: 31 % (ref 20–50)
TOTAL IRON BINDING CAPACITY: 314 UG/DL (ref 250–450)
TRANSFERRIN SERPL-MCNC: 212 MG/DL (ref 200–375)

## 2024-07-23 PROCEDURE — 3077F SYST BP >= 140 MM HG: CPT | Mod: HCNC,CPTII,S$GLB, | Performed by: PHYSICIAN ASSISTANT

## 2024-07-23 PROCEDURE — 82728 ASSAY OF FERRITIN: CPT | Mod: HCNC | Performed by: PHYSICIAN ASSISTANT

## 2024-07-23 PROCEDURE — 3079F DIAST BP 80-89 MM HG: CPT | Mod: HCNC,CPTII,S$GLB, | Performed by: PHYSICIAN ASSISTANT

## 2024-07-23 PROCEDURE — 3288F FALL RISK ASSESSMENT DOCD: CPT | Mod: HCNC,CPTII,S$GLB, | Performed by: PHYSICIAN ASSISTANT

## 2024-07-23 PROCEDURE — 99214 OFFICE O/P EST MOD 30 MIN: CPT | Mod: HCNC,S$GLB,, | Performed by: PHYSICIAN ASSISTANT

## 2024-07-23 PROCEDURE — 1160F RVW MEDS BY RX/DR IN RCRD: CPT | Mod: HCNC,CPTII,S$GLB, | Performed by: PHYSICIAN ASSISTANT

## 2024-07-23 PROCEDURE — G2211 COMPLEX E/M VISIT ADD ON: HCPCS | Mod: HCNC,S$GLB,, | Performed by: PHYSICIAN ASSISTANT

## 2024-07-23 PROCEDURE — 1157F ADVNC CARE PLAN IN RCRD: CPT | Mod: HCNC,CPTII,S$GLB, | Performed by: PHYSICIAN ASSISTANT

## 2024-07-23 PROCEDURE — 1159F MED LIST DOCD IN RCRD: CPT | Mod: HCNC,CPTII,S$GLB, | Performed by: PHYSICIAN ASSISTANT

## 2024-07-23 PROCEDURE — 36415 COLL VENOUS BLD VENIPUNCTURE: CPT | Mod: HCNC | Performed by: PHYSICIAN ASSISTANT

## 2024-07-23 PROCEDURE — 3008F BODY MASS INDEX DOCD: CPT | Mod: HCNC,CPTII,S$GLB, | Performed by: PHYSICIAN ASSISTANT

## 2024-07-23 PROCEDURE — 83540 ASSAY OF IRON: CPT | Mod: HCNC | Performed by: PHYSICIAN ASSISTANT

## 2024-07-23 PROCEDURE — 1101F PT FALLS ASSESS-DOCD LE1/YR: CPT | Mod: HCNC,CPTII,S$GLB, | Performed by: PHYSICIAN ASSISTANT

## 2024-07-23 PROCEDURE — 99999 PR PBB SHADOW E&M-EST. PATIENT-LVL III: CPT | Mod: PBBFAC,HCNC,, | Performed by: PHYSICIAN ASSISTANT

## 2024-07-23 PROCEDURE — 1126F AMNT PAIN NOTED NONE PRSNT: CPT | Mod: HCNC,CPTII,S$GLB, | Performed by: PHYSICIAN ASSISTANT

## 2024-07-23 RX ORDER — LEVODOPA AND CARBIDOPA 95; 23.75 MG/1; MG/1
3 CAPSULE, EXTENDED RELEASE ORAL 3 TIMES DAILY
Qty: 270 CAPSULE | Refills: 11 | Status: SHIPPED | OUTPATIENT
Start: 2024-07-23

## 2024-07-24 RX ORDER — ROPINIROLE 0.25 MG/1
0.25 TABLET, FILM COATED ORAL NIGHTLY
Qty: 30 TABLET | Refills: 11 | Status: SHIPPED | OUTPATIENT
Start: 2024-07-24 | End: 2025-07-24

## 2024-07-26 ENCOUNTER — TELEPHONE (OUTPATIENT)
Dept: BARIATRICS | Facility: CLINIC | Age: 69
End: 2024-07-26
Payer: MEDICARE

## 2024-07-28 ENCOUNTER — HOSPITAL ENCOUNTER (EMERGENCY)
Facility: OTHER | Age: 69
Discharge: HOME | End: 2024-07-28
Attending: EMERGENCY MEDICINE
Payer: MEDICARE

## 2024-07-28 VITALS
OXYGEN SATURATION: 98 % | BODY MASS INDEX: 29.16 KG/M2 | SYSTOLIC BLOOD PRESSURE: 163 MMHG | HEIGHT: 65 IN | HEART RATE: 83 BPM | TEMPERATURE: 98 F | DIASTOLIC BLOOD PRESSURE: 77 MMHG | WEIGHT: 175 LBS | RESPIRATION RATE: 17 BRPM

## 2024-07-28 DIAGNOSIS — M79.604 RIGHT LEG PAIN: ICD-10-CM

## 2024-07-28 PROCEDURE — 63600175 PHARM REV CODE 636 W HCPCS: Mod: HCNC | Performed by: NURSE PRACTITIONER

## 2024-07-28 PROCEDURE — 99285 EMERGENCY DEPT VISIT HI MDM: CPT | Mod: 25,HCNC

## 2024-07-28 PROCEDURE — 96372 THER/PROPH/DIAG INJ SC/IM: CPT | Performed by: NURSE PRACTITIONER

## 2024-07-28 RX ORDER — DEXAMETHASONE SODIUM PHOSPHATE 4 MG/ML
8 INJECTION, SOLUTION INTRA-ARTICULAR; INTRALESIONAL; INTRAMUSCULAR; INTRAVENOUS; SOFT TISSUE
Status: COMPLETED | OUTPATIENT
Start: 2024-07-28 | End: 2024-07-28

## 2024-07-28 RX ORDER — METHOCARBAMOL 500 MG/1
500 TABLET, FILM COATED ORAL EVERY 8 HOURS PRN
Qty: 20 TABLET | Refills: 0 | Status: SHIPPED | OUTPATIENT
Start: 2024-07-28 | End: 2024-07-30 | Stop reason: ALTCHOICE

## 2024-07-28 RX ADMIN — DEXAMETHASONE SODIUM PHOSPHATE 8 MG: 4 INJECTION INTRA-ARTICULAR; INTRALESIONAL; INTRAMUSCULAR; INTRAVENOUS; SOFT TISSUE at 07:07

## 2024-07-28 NOTE — ED TRIAGE NOTES
"Pt reports to the ED with complaints of right leg pain. Pt endorses pain radiates from her thigh to her right lower back. Pt is unaware of what caused the injury. Reports "I can barely walk or move, and I am in excruciating pain". AAOx4. NAD noted.   "

## 2024-07-29 ENCOUNTER — PATIENT MESSAGE (OUTPATIENT)
Dept: NEUROLOGY | Facility: CLINIC | Age: 69
End: 2024-07-29
Payer: MEDICARE

## 2024-07-29 PROCEDURE — 99284 EMERGENCY DEPT VISIT MOD MDM: CPT | Mod: 25,HCNC

## 2024-07-29 NOTE — ED PROVIDER NOTES
Encounter Date: 7/28/2024       History     Chief Complaint   Patient presents with    Leg Pain     Pt reports right thigh pain to the entire muscle and then she states it travels around to the right lower back for the past two days.      70 y/o female with Parkinson's, arthritis, and GERD which presents with right leg pain that radiates to her back. Denies injury or trauma. Pain is worse with movement. NO other complaints.     The history is provided by the patient.     Review of patient's allergies indicates:  No Known Allergies  Past Medical History:   Diagnosis Date    Arthritis     Chest pain     GERD (gastroesophageal reflux disease)      Past Surgical History:   Procedure Laterality Date    ABDOMINOPLASTY      APPENDECTOMY  2010    BILATERAL SALPINGO-OOPHORECTOMY (BSO)  12/05/2019    Surgeon: Geri Azar MD -- [facility]/BSO    COLONOSCOPY N/A 08/22/2019    Procedure: COLONOSCOPY;  Surgeon: Rose Mary Ervin MD;  Location: [address]);  Service: Endoscopy;  Laterality: N/A;  no PM prep    CYSTOSCOPY N/A 12/05/2019    Surgeon: Geri Azar MD -- [facility]/BSO    KNEE SURGERY Right 12/2015    TKR    LAPAROSCOPIC TOTAL HYSTERECTOMY N/A 12/05/2019    Surgeon: Geri Azar MD -- [facility]/BSO    TUBAL LIGATION      age 30's     Family History   Problem Relation Name Age of Onset    Diabetes Mother      Hypertension Mother      Hypothyroidism Mother      Asthma Father age 52     Hypertension Sister Leisa     Cancer Sister Leisa         breast    Breast cancer Sister Leisa     Hypertension Brother      Hypertension Sister      Hypertension Sister      Hypertension Brother      Hypertension Son      Colon cancer Neg Hx      Ovarian cancer Neg Hx       Social History     Tobacco Use    Smoking status: Never     Passive exposure: Never    Smokeless tobacco: Never    Tobacco comments:     RN at [employer]   Substance Use Topics    Alcohol use: No    Drug use: No     Review of Systems   Constitutional:  Negative  for fever.   HENT:  Negative for sore throat.    Respiratory:  Negative for shortness of breath.    Cardiovascular:  Negative for chest pain.   Gastrointestinal:  Negative for nausea.   Genitourinary:  Negative for dysuria.   Musculoskeletal:  Positive for myalgias. Negative for back pain.   Skin:  Negative for rash.   Neurological:  Negative for weakness.   Hematological:  Does not bruise/bleed easily.   All other systems reviewed and are negative.      Physical Exam     Initial Vitals [07/28/24 1724]   BP Pulse Resp Temp SpO2   (!) 161/77 86 18 98.3 °F (36.8 °C) 98 %      MAP       --         Physical Exam    Nursing note and vitals reviewed.  Constitutional: She appears well-developed and well-nourished.   HENT:   Head: Normocephalic and atraumatic.   Eyes: Conjunctivae and EOM are normal. Pupils are equal, round, and reactive to light.   Neck:   Normal range of motion.  Cardiovascular:  Normal rate, regular rhythm, normal heart sounds and intact distal pulses.     Exam reveals no gallop and no friction rub.       No murmur heard.  Pulmonary/Chest: Breath sounds normal. No respiratory distress. She has no wheezes. She has no rhonchi. She has no rales. She exhibits no tenderness.   Musculoskeletal:         General: No tenderness or edema. Normal range of motion.      Cervical back: Normal range of motion.     Neurological: She is alert and oriented to person, place, and time. She has normal strength. GCS score is 15. GCS eye subscore is 4. GCS verbal subscore is 5. GCS motor subscore is 6.   Skin: Skin is warm. Capillary refill takes less than 2 seconds. No rash noted. No erythema.   Psychiatric: She has a normal mood and affect.       ED Course   Procedures  Labs Reviewed   URINALYSIS, REFLEX TO URINE CULTURE          Imaging Results              US Lower Extremity Veins Right (Final result)  Result time 07/28/24 20:34:07      Final result by Lupe Angeles MD (07/28/24 20:34:07)                   Impression:       No evidence of right lower extremity deep venous thrombosis.      Electronically signed by: Lupe Angeles MD  Date:    07/28/2024  Time:    20:34               Narrative:    EXAMINATION:  US LOWER EXTREMITY VEINS RIGHT    CLINICAL HISTORY:  Pain in right leg    TECHNIQUE:  Duplex and color flow Doppler evaluation of the right lower extremity veins was performed.    COMPARISON:  None    FINDINGS:  No evidence of clot involving the bilateral common femoral veins or right greater saphenous, femoral, popliteal, peroneal, anterior and posterior tibial veins.  All venous structures demonstrate normal respiratory phasicity and augment adequately.  No evidence of soft tissue mass or Baker's cyst.                                       Medications   dexAMETHasone injection 8 mg (8 mg Intramuscular Given 7/28/24 1947)     Medical Decision Making  68 y/o female which presents to the ED with sharp intermittent pain to her right leg. She has no pain on exam. US negative for DVT. Pt given decadron and discharged with robaxin. Unclear etiology on what is causing the pain but it is most likely musculoskeletal. Patient given strict return precautions and voiced understanding of all discharge instructions. Pt was stable at discharge.     Differential Diagnosis: muscle strain, DVT, contusion, radiculopathy           Problems Addressed:  Right leg pain: acute illness or injury    Risk  OTC drugs.  Prescription drug management.               ED Course as of 07/28/24 2241   Sun Jul 28, 2024 1821 BP(!): 161/77 [AT]   1821 Temp: 98.3 °F (36.8 °C) [AT]   1821 Temp Source: Oral [AT]   1821 Pulse: 86 [AT]   1821 Resp: 18 [AT]   1821 SpO2: 98 % [AT]      ED Course User Index  [AT] Olivia Silva FNP                           Clinical Impression:  Final diagnoses:  [M79.604] Right leg pain          ED Disposition Condition    Discharge Stable          ED Prescriptions       Medication Sig Dispense Start Date End Date Auth. Provider     methocarbamoL (ROBAXIN) 500 MG Tab Take 1 tablet (500 mg total) by mouth every 8 (eight) hours as needed (muscle pain). 20 tablet 7/28/2024 8/7/2024 Olivia Silva FNP          Follow-up Information       Follow up With Specialties Details Why Contact Info    Fab Grajeda MD Family Medicine Schedule an appointment as soon as possible for a visit  As needed [address] [facility] [address]  [phone]               Olivia Silva FNP  07/28/24 0231

## 2024-07-30 ENCOUNTER — HOSPITAL ENCOUNTER (EMERGENCY)
Facility: HOSPITAL | Age: 69
Discharge: HOME | End: 2024-07-30
Attending: EMERGENCY MEDICINE
Payer: MEDICARE

## 2024-07-30 VITALS
DIASTOLIC BLOOD PRESSURE: 74 MMHG | SYSTOLIC BLOOD PRESSURE: 118 MMHG | WEIGHT: 175 LBS | TEMPERATURE: 98 F | OXYGEN SATURATION: 97 % | RESPIRATION RATE: 16 BRPM | HEART RATE: 70 BPM | BODY MASS INDEX: 29.12 KG/M2

## 2024-07-30 DIAGNOSIS — R52 PAIN: ICD-10-CM

## 2024-07-30 DIAGNOSIS — M62.838 MUSCLE SPASM OF RIGHT LEG: Primary | ICD-10-CM

## 2024-07-30 LAB
ALBUMIN SERPL BCP-MCNC: 3.8 G/DL (ref 3.5–5.2)
ALP SERPL-CCNC: 70 U/L (ref 55–135)
ALT SERPL W/O P-5'-P-CCNC: 18 U/L (ref 10–44)
ANION GAP SERPL CALC-SCNC: 9 MMOL/L (ref 8–16)
AST SERPL-CCNC: 34 U/L (ref 10–40)
BASOPHILS # BLD AUTO: 0.05 K/UL (ref 0–0.2)
BASOPHILS NFR BLD: 0.3 % (ref 0–1.9)
BILIRUB SERPL-MCNC: 0.4 MG/DL (ref 0.1–1)
BILIRUB UR QL STRIP: NEGATIVE
BUN SERPL-MCNC: 25 MG/DL (ref 8–23)
CALCIUM SERPL-MCNC: 9.9 MG/DL (ref 8.7–10.5)
CHLORIDE SERPL-SCNC: 105 MMOL/L (ref 95–110)
CK SERPL-CCNC: 56 U/L (ref 20–180)
CLARITY UR REFRACT.AUTO: CLEAR
CO2 SERPL-SCNC: 25 MMOL/L (ref 23–29)
COLOR UR AUTO: YELLOW
CREAT SERPL-MCNC: 1.1 MG/DL (ref 0.5–1.4)
DIFFERENTIAL METHOD BLD: ABNORMAL
EOSINOPHIL # BLD AUTO: 0 K/UL (ref 0–0.5)
EOSINOPHIL NFR BLD: 0.2 % (ref 0–8)
ERYTHROCYTE [DISTWIDTH] IN BLOOD BY AUTOMATED COUNT: 14 % (ref 11.5–14.5)
EST. GFR  (NO RACE VARIABLE): 54.4 ML/MIN/1.73 M^2
GLUCOSE SERPL-MCNC: 90 MG/DL (ref 70–110)
GLUCOSE UR QL STRIP: NEGATIVE
HCT VFR BLD AUTO: 39.4 % (ref 37–48.5)
HCV AB SERPL QL IA: NORMAL
HGB BLD-MCNC: 12.4 G/DL (ref 12–16)
HGB UR QL STRIP: NEGATIVE
HIV 1+2 AB+HIV1 P24 AG SERPL QL IA: NORMAL
IMM GRANULOCYTES # BLD AUTO: 0.08 K/UL (ref 0–0.04)
IMM GRANULOCYTES NFR BLD AUTO: 0.5 % (ref 0–0.5)
KETONES UR QL STRIP: NEGATIVE
LEUKOCYTE ESTERASE UR QL STRIP: NEGATIVE
LYMPHOCYTES # BLD AUTO: 3.3 K/UL (ref 1–4.8)
LYMPHOCYTES NFR BLD: 19.2 % (ref 18–48)
MAGNESIUM SERPL-MCNC: 2.1 MG/DL (ref 1.6–2.6)
MCH RBC QN AUTO: 31 PG (ref 27–31)
MCHC RBC AUTO-ENTMCNC: 31.5 G/DL (ref 32–36)
MCV RBC AUTO: 99 FL (ref 82–98)
MONOCYTES # BLD AUTO: 1.6 K/UL (ref 0.3–1)
MONOCYTES NFR BLD: 9.4 % (ref 4–15)
NEUTROPHILS # BLD AUTO: 12 K/UL (ref 1.8–7.7)
NEUTROPHILS NFR BLD: 70.4 % (ref 38–73)
NITRITE UR QL STRIP: NEGATIVE
NRBC BLD-RTO: 0 /100 WBC
PH UR STRIP: 5 [PH] (ref 5–8)
PLATELET # BLD AUTO: 364 K/UL (ref 150–450)
PMV BLD AUTO: 12.2 FL (ref 9.2–12.9)
POTASSIUM SERPL-SCNC: 3.8 MMOL/L (ref 3.5–5.1)
PROT SERPL-MCNC: 7.5 G/DL (ref 6–8.4)
PROT UR QL STRIP: NEGATIVE
RBC # BLD AUTO: 4 M/UL (ref 4–5.4)
SODIUM SERPL-SCNC: 139 MMOL/L (ref 136–145)
SP GR UR STRIP: 1.02 (ref 1–1.03)
URN SPEC COLLECT METH UR: NORMAL
WBC # BLD AUTO: 17.01 K/UL (ref 3.9–12.7)

## 2024-07-30 PROCEDURE — 86803 HEPATITIS C AB TEST: CPT | Mod: HCNC | Performed by: PHYSICIAN ASSISTANT

## 2024-07-30 PROCEDURE — 83735 ASSAY OF MAGNESIUM: CPT | Mod: HCNC | Performed by: EMERGENCY MEDICINE

## 2024-07-30 PROCEDURE — 82550 ASSAY OF CK (CPK): CPT | Mod: HCNC | Performed by: EMERGENCY MEDICINE

## 2024-07-30 PROCEDURE — 87389 HIV-1 AG W/HIV-1&-2 AB AG IA: CPT | Mod: HCNC | Performed by: PHYSICIAN ASSISTANT

## 2024-07-30 PROCEDURE — 80053 COMPREHEN METABOLIC PANEL: CPT | Mod: HCNC | Performed by: EMERGENCY MEDICINE

## 2024-07-30 PROCEDURE — 25000003 PHARM REV CODE 250: Mod: HCNC | Performed by: EMERGENCY MEDICINE

## 2024-07-30 PROCEDURE — 81003 URINALYSIS AUTO W/O SCOPE: CPT | Mod: HCNC | Performed by: EMERGENCY MEDICINE

## 2024-07-30 PROCEDURE — 85025 COMPLETE CBC W/AUTO DIFF WBC: CPT | Mod: HCNC | Performed by: EMERGENCY MEDICINE

## 2024-07-30 PROCEDURE — 96361 HYDRATE IV INFUSION ADD-ON: CPT | Mod: HCNC

## 2024-07-30 PROCEDURE — 96360 HYDRATION IV INFUSION INIT: CPT | Mod: HCNC

## 2024-07-30 RX ORDER — CYCLOBENZAPRINE HCL 5 MG
5 TABLET ORAL 3 TIMES DAILY PRN
Qty: 30 TABLET | Refills: 0 | Status: SHIPPED | OUTPATIENT
Start: 2024-07-30 | End: 2024-07-30

## 2024-07-30 RX ORDER — ROPINIROLE 0.25 MG/1
0.25 TABLET, FILM COATED ORAL
Status: COMPLETED | OUTPATIENT
Start: 2024-07-30 | End: 2024-07-30

## 2024-07-30 RX ORDER — CYCLOBENZAPRINE HCL 5 MG
10 TABLET ORAL
Status: COMPLETED | OUTPATIENT
Start: 2024-07-30 | End: 2024-07-30

## 2024-07-30 RX ORDER — CYCLOBENZAPRINE HCL 10 MG
10 TABLET ORAL
Status: COMPLETED | OUTPATIENT
Start: 2024-07-30 | End: 2024-07-30

## 2024-07-30 RX ORDER — CYCLOBENZAPRINE HCL 5 MG
10 TABLET ORAL 3 TIMES DAILY PRN
Qty: 60 TABLET | Refills: 0 | Status: SHIPPED | OUTPATIENT
Start: 2024-07-30 | End: 2024-08-09

## 2024-07-30 RX ORDER — CYCLOBENZAPRINE HCL 5 MG
5 TABLET ORAL
Status: DISCONTINUED | OUTPATIENT
Start: 2024-07-30 | End: 2024-07-30

## 2024-07-30 RX ADMIN — SODIUM CHLORIDE 1000 ML: 9 INJECTION, SOLUTION INTRAVENOUS at 02:07

## 2024-07-30 RX ADMIN — CYCLOBENZAPRINE HYDROCHLORIDE 10 MG: 5 TABLET, FILM COATED ORAL at 06:07

## 2024-07-30 RX ADMIN — CYCLOBENZAPRINE HYDROCHLORIDE 10 MG: 10 TABLET, FILM COATED ORAL at 02:07

## 2024-07-30 RX ADMIN — ROPINIROLE HYDROCHLORIDE 0.25 MG: 0.25 TABLET, FILM COATED ORAL at 05:07

## 2024-07-30 NOTE — TELEPHONE ENCOUNTER
Placed call to patient. No answer. Noted pt was seen in main ED again for back and leg pain today. She was given 2 doses of Flexeril and advised to see her PCP within 2 days.

## 2024-07-31 NOTE — TELEPHONE ENCOUNTER
----- Message from Krystal Barlow sent at 7/31/2024 10:44 AM CDT -----  Regarding: Returning Missed Call  Contact: [phone]  Returning a Missed Call    Caller: Patient     Returning call to: MANOJ Barakat

## 2024-07-31 NOTE — TELEPHONE ENCOUNTER
Spoke to patient. Had Two ER visits. One on Saturday and also again yesterday due to excruciating pain in bilateral legs and also  c/o back pain.   Patient had complete work up in ER. Due to spasms.  Is taking Flexeril around the clock.     On 7/25-She Began Ropinirole 0.25mg for her RLS.   Wonders if this pain could be related to the medicine. Feels the med does help her RLS symptoms at night. But the spasms are bad the rest of the time.

## 2024-08-01 ENCOUNTER — TELEPHONE (OUTPATIENT)
Dept: NEUROLOGY | Facility: CLINIC | Age: 69
End: 2024-08-01
Payer: MEDICARE

## 2024-08-01 NOTE — TELEPHONE ENCOUNTER
----- Message from Christin Harrison sent at 8/1/2024  9:27 AM CDT -----  Regarding: missed call  Contact: [phone]  Brittany Modi calling regarding Patient Advice (message) for # pt returning miss call from [place]

## 2024-08-01 NOTE — TELEPHONE ENCOUNTER
Advised per JS, Ok to try cd/ld 1.5 tabs PO TID. This may help If her pain is dystonia related. Patient verbalize understanding. Encouraged to keep appt with PCP on 8/2/24. Patient verbalized thanks and will keep us posted.

## 2024-09-05 ENCOUNTER — PATIENT MESSAGE (OUTPATIENT)
Dept: NEUROLOGY | Facility: CLINIC | Age: 69
End: 2024-09-05
Payer: MEDICARE

## 2024-09-05 RX ORDER — ROPINIROLE 0.25 MG/1
0.25 TABLET, FILM COATED ORAL 2 TIMES DAILY
Qty: 60 TABLET | Refills: 11 | Status: SHIPPED | OUTPATIENT
Start: 2024-09-05 | End: 2025-09-05

## 2024-09-20 ENCOUNTER — PATIENT MESSAGE (OUTPATIENT)
Dept: NEUROLOGY | Facility: CLINIC | Age: 69
End: 2024-09-20
Payer: MEDICARE

## 2024-10-25 ENCOUNTER — TELEPHONE (OUTPATIENT)
Dept: NEUROLOGY | Facility: CLINIC | Age: 69
End: 2024-10-25
Payer: MEDICARE

## 2024-10-28 ENCOUNTER — OFFICE VISIT (OUTPATIENT)
Dept: NEUROLOGY | Facility: CLINIC | Age: 69
End: 2024-10-28
Payer: MEDICARE

## 2024-10-28 VITALS
HEIGHT: 65 IN | WEIGHT: 178 LBS | SYSTOLIC BLOOD PRESSURE: 153 MMHG | BODY MASS INDEX: 29.66 KG/M2 | DIASTOLIC BLOOD PRESSURE: 77 MMHG | HEART RATE: 80 BPM

## 2024-10-28 DIAGNOSIS — Z71.89 COUNSELING REGARDING GOALS OF CARE: ICD-10-CM

## 2024-10-28 DIAGNOSIS — G20.B2 PARKINSON'S DISEASE WITH DYSKINESIA AND FLUCTUATING MANIFESTATIONS: Primary | ICD-10-CM

## 2024-10-28 DIAGNOSIS — G25.81 RESTLESS LEG SYNDROME: ICD-10-CM

## 2024-10-28 DIAGNOSIS — G47.9 SLEEP DISTURBANCE: ICD-10-CM

## 2024-10-28 PROCEDURE — 1159F MED LIST DOCD IN RCRD: CPT | Mod: HCNC,CPTII,S$GLB, | Performed by: PHYSICIAN ASSISTANT

## 2024-10-28 PROCEDURE — 99213 OFFICE O/P EST LOW 20 MIN: CPT | Mod: HCNC,S$GLB,, | Performed by: PHYSICIAN ASSISTANT

## 2024-10-28 PROCEDURE — 1101F PT FALLS ASSESS-DOCD LE1/YR: CPT | Mod: HCNC,CPTII,S$GLB, | Performed by: PHYSICIAN ASSISTANT

## 2024-10-28 PROCEDURE — 1126F AMNT PAIN NOTED NONE PRSNT: CPT | Mod: HCNC,CPTII,S$GLB, | Performed by: PHYSICIAN ASSISTANT

## 2024-10-28 PROCEDURE — 3008F BODY MASS INDEX DOCD: CPT | Mod: HCNC,CPTII,S$GLB, | Performed by: PHYSICIAN ASSISTANT

## 2024-10-28 PROCEDURE — G2211 COMPLEX E/M VISIT ADD ON: HCPCS | Mod: HCNC,S$GLB,, | Performed by: PHYSICIAN ASSISTANT

## 2024-10-28 PROCEDURE — 1160F RVW MEDS BY RX/DR IN RCRD: CPT | Mod: HCNC,CPTII,S$GLB, | Performed by: PHYSICIAN ASSISTANT

## 2024-10-28 PROCEDURE — 3288F FALL RISK ASSESSMENT DOCD: CPT | Mod: HCNC,CPTII,S$GLB, | Performed by: PHYSICIAN ASSISTANT

## 2024-10-28 PROCEDURE — 1157F ADVNC CARE PLAN IN RCRD: CPT | Mod: HCNC,CPTII,S$GLB, | Performed by: PHYSICIAN ASSISTANT

## 2024-10-28 PROCEDURE — 3078F DIAST BP <80 MM HG: CPT | Mod: HCNC,CPTII,S$GLB, | Performed by: PHYSICIAN ASSISTANT

## 2024-10-28 PROCEDURE — 3077F SYST BP >= 140 MM HG: CPT | Mod: HCNC,CPTII,S$GLB, | Performed by: PHYSICIAN ASSISTANT

## 2024-10-28 PROCEDURE — 99999 PR PBB SHADOW E&M-EST. PATIENT-LVL III: CPT | Mod: PBBFAC,HCNC,, | Performed by: PHYSICIAN ASSISTANT

## 2024-11-12 ENCOUNTER — RESEARCH ENCOUNTER (OUTPATIENT)
Dept: RESEARCH | Facility: HOSPITAL | Age: 69
End: 2024-11-12
Payer: MEDICARE

## 2024-11-12 ENCOUNTER — LAB VISIT (OUTPATIENT)
Dept: LAB | Facility: HOSPITAL | Age: 69
End: 2024-11-12
Attending: STUDENT IN AN ORGANIZED HEALTH CARE EDUCATION/TRAINING PROGRAM
Payer: MEDICARE

## 2024-11-12 ENCOUNTER — OFFICE VISIT (OUTPATIENT)
Dept: NEUROLOGY | Facility: CLINIC | Age: 69
End: 2024-11-12
Payer: MEDICARE

## 2024-11-12 VITALS
BODY MASS INDEX: 29.99 KG/M2 | WEIGHT: 180 LBS | SYSTOLIC BLOOD PRESSURE: 153 MMHG | HEIGHT: 65 IN | HEART RATE: 75 BPM | DIASTOLIC BLOOD PRESSURE: 80 MMHG

## 2024-11-12 DIAGNOSIS — Z00.6 RESEARCH STUDY PATIENT: ICD-10-CM

## 2024-11-12 DIAGNOSIS — G20.B2 PARKINSON'S DISEASE WITH DYSKINESIA AND FLUCTUATING MANIFESTATIONS: Primary | ICD-10-CM

## 2024-11-12 DIAGNOSIS — G20.B2 PARKINSON'S DISEASE WITH DYSKINESIA AND FLUCTUATING MANIFESTATIONS: ICD-10-CM

## 2024-11-12 LAB
DRUG STUDY SPECIMEN TYPE: NORMAL
DRUG STUDY TEST NAME: NORMAL
DRUG STUDY TEST RESULT: NORMAL

## 2024-11-12 PROCEDURE — 99499 UNLISTED E&M SERVICE: CPT | Mod: HCNC,S$GLB,, | Performed by: STUDENT IN AN ORGANIZED HEALTH CARE EDUCATION/TRAINING PROGRAM

## 2024-11-12 PROCEDURE — 99000 SPECIMEN HANDLING OFFICE-LAB: CPT | Mod: HCNC | Performed by: STUDENT IN AN ORGANIZED HEALTH CARE EDUCATION/TRAINING PROGRAM

## 2024-11-12 PROCEDURE — 99999 PR PBB SHADOW E&M-EST. PATIENT-LVL II: CPT | Mod: PBBFAC,HCNC,, | Performed by: STUDENT IN AN ORGANIZED HEALTH CARE EDUCATION/TRAINING PROGRAM

## 2024-11-12 PROCEDURE — 36415 COLL VENOUS BLD VENIPUNCTURE: CPT | Mod: HCNC | Performed by: STUDENT IN AN ORGANIZED HEALTH CARE EDUCATION/TRAINING PROGRAM

## 2024-11-12 NOTE — PROGRESS NOTES
Study Title: Black and  Americans Connections to Parkinson's Disease (BLAAC PD) A Project of the Global Parkinson's Genetics Program(GP2)    : Tiera Singer MD  IRB#: [id number]  Initial IRB approval date: February 6, 2024    Study Personnel Conducting Visit:Itzel Barlow    Visit Time point:  Consent  Visit Date: 12NOV2024  Subject ID: [id number]  Official Enrollment Date: 12NOV2024        INFORMED CONSENT -  Present for discussion: Patient and CRC  Is LAR Consenting for Subject: not applicable.if yes, note relationship  Is an impartial witness present? not applicable  Study Personnel Obtaining Consent: Itzel Barlow    Per Dr. Pack (Sub-I), I met with the pt in clinic to review the consent form for the GP2- Main ICF. Pt was accompanied by those mentioned above. The nature of the research was fully explained to the potential subject and each page of the consent form was reviewed with the patient in the presence of those present for discussion. The purpose of the study, length of the study, procedures and study visits related to the study, risks, potential benefits, costs, payment for participation and/or reimbursement of expenses; alternative methods/treatments, study-related questions and compensation for injury, rights, contact information, voluntary participation, employees in research, new findings, DNA sequencing, return of research results, future research, study withdrawal, confidentiality, ClinicalTrials.gov and HIPAA authorization were fully discussed.     The subject was provided with ample time to review the consent, consider participation, and ask questions related to this study. All questions were answered appropriately and to their satisfaction.  Dr. Pack (Sub-I) also met with the patient and answered any additional questions that the pt had. The subject was able to verbalize understanding of the consent form and agreed to participate. The Patient and CRC  signed the most recently IRB-approved version of the consent form and was provided with a copy of the signed consent form, which includes the PI's contact information. Subject was also provided with the CRC's contact information. The original consent form was uploaded into the electronic medical records (Epic).     No study-related activity was initiated prior to obtaining consent.       -------------------------------------------------------------------------------------------------------------------    INCLUSION/EXCLUSION -  Dr. Pack (Sub-I) reviewed all of the inclusion and exclusion criteria for the GP2 study and verified that the Pt.is eligible at this time.  The Pt withdrew from the study or was excluded following enrollment? No  - If Yes, please describe:     NEUROLOGICAL HISTORY REVIEWED WITH PT -  Do you have any neurological condition diagnosed by a neurologist specialist? (If yes, what condition?): Yes - PD  Did your parents or full-sibling(s) have Parkinson's disease or any neurodegenerative disease? (If yes, who?): No  Did your grandparents, half-siblings, uncles, aunts, have Parkinson's disease or any neurodegenerative disease?no  3a. Please specify family members relation to you and the neurodegenerative disease:     REPORTABLE EVENTS -  Did Pt have a rxn to smell test? yes  Did Pt.have a rxn to blood collection? yes    QUESTIONNAIRES/DEMOGRAPHICS -     Please Note: All questions in the questionnaire require an answer (mandatory) unless otherwise specified.    Site Number: [id number]  *must provide value    Participant's ID   *must provide value [id number]   Enrollment Year (2024)   *must provide value    Age at Baseline Visit 69  *must provide value    Sex Assigned at Birth (Choose only one)   *must provide value   [] Male   [x] Female   [] Intersex   [] Unknown   [] Other, Specify:      Race Category (Choose all that apply):   *must provide value   []  or    []     [x] Black or    []  or Other    [] White   [] Other      Education: (this question not mandatory)    [] High School   [] High School/GED   [] Some college without degree   [] Associate degree college   [] Bachelor's degree   [x] Master's degree   [] Professional or doctoral degree      How did you hear about the study?   *must provide value   [x] From my clinic   [] From my community   [] None of the above   [] Other, specify:      RECRUITMENT CATEGORY   *must provide value   [x] Case   [] Control      Participant has consented to contact about future research    [x] Yes   [] No      Has the sample been collected?   *must provide value   [x] Yes   [] No      What is the collected sample?   *must provide value   [x] Blood   [] Saliva     Sample Collection Notes: Venipuncture Lab collection @15:02   BIOSAMPLE COLLECTION:  Was blood collected? Yes  Blood collected today at 15:02       Was saliva collected? No     Confirmed Pt. Did not eat, drink, smoke, chew gum, or take anything by mouth for 30 minutes prior to saliva collection:  N/A    NOTE: Ensure Pt. Has washed off any cosmetics such as lipstick, lip balms, and/or sunscreen as they can contaminate the sample    UPSIT SAMPLE COLLECTION:    Do you currently have an upper respiratory tract infection (e.g. head cold or flu)?    [] Yes  [x] No     Have you ever tested positive for COVID-19 (coronavirus)?    [x] Yes  [] No     Do you believe you were infected with COVID-19 (coronavirus), but did not undergo testing to confirm this diagnosis?    [] Yes  [x] No     Have you ever habitually smoked cigarettes?    [x] Never habitually smoked   [] Current smoker   [] Previous smoker    What age did you start? _____    What age did you stop? _____    On average throughout your smoke history, how many packs of cigarettes would you have smoked within a day? _______      Have you ever habitually smoked products other than  conventional cigarettes (e.g. pipe, cigar, marijuana, electronic cigarettes)?    [x] Never habitually smoked   [] Current smoker   [] Previous smoker      Do you have any other history of environmental factors that could affect your sense of smell (Examples: exposure to chemicals, refinery worker, , )?    []Yes  [x]No     Final UPSIT score (0-100)                                                                     16   Has the participant fully completed the UPSIT?    [x] Yes  [] No     If no, please explain. *must provide value    [] Participant declined compensation   [] Other; please explain other: ____________________________________________      Date of UPSIT completion (MM/DD/YYYY)  12NOV2024     RE-CONSENTED participants (UPSIT ONLY): Did the participant complete the UPSIT? *must provide    [x] This is not a re-consented participant, this is a NEW participant   [] Yes, UPSIT is complete   [] No, UPSIT is not completed      Has the re-consented participant signed the compensation form and been compensated for the UPSIT?      [] Yes, compensation was provided   [] No   [x] N/A    If no, please explain Other   [] Participant declined compensation   [] Other        NEW participants: Did the participant provide a biosample and UPSIT? *must provide value    [x] Yes  [] No  [] This is an UPSIT-only participant        PATIENT COMPENSATION INFORMATION -   Participants receive $25 in compensation for their participation in the Global Parkinson's Genetic Program for completion of the UPSIT Smell Test.    Has the participant signed the compensation form?yes  Has the participant received compensation?yes  Has the participant completed all required visit activities and thus officially enrolled in the study?complete: enrollment of PD participant (blood or saliva collection + clinical data collection)  Form Completion Date: 12NOV2024  [place] #: [id number]     Study Title: Genetics of  Americans  [place]- Parkinson's Disease Study ([facility]-PD Study)   : Tiera Singer MD  IRB#: [id number]  Initial IRB approval date: April 11, 2024    Study Personnel Conducting Visit:Itzel Barlow    Visit Time point:  Consent  Visit Date: 12NOV2024  Subject ID: [id number]  Official Enrollment Date: 12NOV2024        INFORMED CONSENT -  Present for discussion: Patient  Is LAR Consenting for Subject: not applicable.if yes, note relationship  Is an impartial witness present? not applicable  Study Personnel Obtaining Consent: Itzel Barlow    Per Dr. Pack (Sub-I), I met with the pt in clinic to review the consent form for the [facility]-PD study. Pt was accompanied by those mentioned above. The nature of the research was fully explained to the potential subject and each page of the consent form was reviewed with the patient in the presence of those present for discussion. The purpose of the study, length of the study, procedures and study visits related to the study, risks, potential benefits, costs, payment for participation and/or reimbursement of expenses; alternative methods/treatments, study-related questions and compensation for injury, rights, contact information, voluntary participation, employees in research, new findings, confidentiality, return of research results, future research, study withdrawal, confidentiality, ClinicalTrials.gov and HIPAA authorization were fully discussed.     The subject was provided with ample time to review the consent, consider participation, and ask questions related to this study. All questions were answered appropriately and to their satisfaction.  Dr. Pack (Sub-I) also met with the patient and answered any additional questions that the pt had. The subject was able to verbalize understanding of the consent form and agreed to participate. The Patient and CRC signed the most recently IRB-approved version of the consent form and was provided with a copy of  the signed consent form, which includes the PI's contact information. Subject was also provided with the CRC's contact information. The original consent form was uploaded into the electronic medical records (Epic).     No study-related activity was initiated prior to obtaining consent.     -------------------------------------------------------------------------------------------------------------------    INCLUSION/EXCLUSION -  Dr. Pack (Sub-I) reviewed all of the inclusion and exclusion criteria for the [facility]-PD study and verified that the Pt.is eligible at this time.  The Pt withdrew from the study or was excluded following enrollment? No  - If Yes, please describe:     NEUROLOGICAL HISTORY REVIEWED WITH PT -  Do you have any neurological condition diagnosed by a neurologist specialist? (If yes, what condition?): Yes - PD  Did your parents or full-sibling(s) have Parkinson's disease or any neurodegenerative disease? (If yes, who?): No  Did your grandparents, half-siblings, uncles, aunts, have Parkinson's disease or any neurodegenerative disease?no  3a. Please specify family members relation to you and the neurodegenerative disease:     SURVEYS/QUESTIONNAIRES:    15 Minute Parkinson's Disease Survey:    Was this survey completed? yes  If YES, survey will be documented in the EDC used for this trial([employer])    REPORTABLE EVENTS -  Did Pt.have a rxn to blood collection? yes      BIOSAMPLE COLLECTION:  Was blood collected? yes  Blood collected today at 15:02

## 2024-11-12 NOTE — PROGRESS NOTES
HPI: 70 yo RH woman with tremor-predominant PD (L-sided). Diagnosed around 2022. Good response to levodopa thus far. Here for research visit interested in all studies available.         QUESTIONNAIRES/DEMOGRAPHICS -     FAMILY HISTORY    Do you have Parkinson's disease or any neurodegenerative disease diagnosed by a neurologist specialist?   *must provide value   [x]  Yes   []  No      Do you have any neurological condition diagnosed by a neurologist specialist?   *must provide value   []  Yes, I have been diagnosed with:   [x]  No     What other condition(s) have you been diagnosed with? na   Have you been involved in a Parkinson's disease genetics study before (i.e. PPMI, [facility], [facility])?   *must provide value    []  Yes (if so, please specify the name of the study or company):   [x]  No      Did your parents or full siblings have Parkinson's disease or any neurodegenerative disease?   *must provide value   []  Yes   [x]  No   []  Unknown     Please specify family member relation to you and the neurodegenerative disease. N/a   Did your grandparents, half-siblings, uncles, aunts have Parkinson's disease or any neurodegenerative disease?   *must provide value   []  Yes   [x]  No   []  Unknown     Please specify family member relation to you. For example, maternal grandmother or paternal uncle, has the neurodegenerative disease. N/a   Supportive Criteria:    Presence of bradykinesia   *must provide value   [x]  Yes   []  No      Presence of rest tremor   *must provide value   [x]  Yes   []  No      Presence of rigidity   *must provide value   [x]  Yes   []  No        1. Clear and dramatic beneficial response to dopaminergic therapy. During initial treatment, patient returned to normal or near-normal level of function. In absence of clear documentation of initial response a dramatic response can be classified as:   a.) Marked improvement with dose increases or marked worsening with dose decreases.  Mild changes do not qualify. Document this either objectively (>30% in UPDRS III with change in treatment), or subjectively (clearly-documented history of marked changes from a reliable patient or caregiver).   b.) Unequivocal and marked on/off fluctuations, which must have at some point included predictable end-of-dose wearing off.   *must provide value   [x]  Yes   []  No      2. Presence of levodopa-induced dyskinesia   *must provide value   []  Yes   [x]  No      3. Rest tremor of a limb, documented on clinical examination (in past, or on current examination)   *must provide value   [x]  Yes   []  No      4. The presence of either olfactory loss or cardiac sympathetic denervation on MIBG scintigraphy   *must provide value   [x]  Yes   []  No        Absolute exclusion criteria: The presence of any of these features rules out PD:    1. Unequivocal cerebellar abnormalities, such as cerebellar gait, limb ataxia, or cerebellar oculomotor abnormalities (eg, sustained gaze evoked nystag- mus, macro square wave jerks, hypermetric saccades)   *must provide value   []  Yes   [x]  No      2. Downward vertical supranuclear gaze palsy, or selective slowing of downward vertical saccades   *must provide value   []  Yes   [x]  No      3. Diagnosis of probable behavioral variant frontotemporal dementia or primary progressive aphasia, defined according to consensus [id number] within the first 5 y of disease  *must provide value   []  Yes   [x]  No      4. Parkinsonian features restricted to the lower limbs for more than 3 y   *must provide value   []  Yes   [x]  No      5. Treatment with a dopamine receptor blocker or a dopamine-depleting agent in a dose and time-course consistent with drug-induced parkinsonism   *must provide value    []  Yes   [x]  No      6. Absence of observable response to high-dose levodopa despite at least moderate severity of disease  *must provide value []  Yes   [x]  No    7. Unequivocal cortical  sensory loss (I.e. graphesthesia, stereognosis with intact primary sensory modalities), clear limb ideomotor apraxia, or progressive aphasia  *must provide value []  Yes   [x]  No    8. Normal function neuroimaging of the presynaptic dopaminergic system  *must provide value []  Yes   [x]  No    9. Documentation of an alternative condition known to produce parkinsonism and plausibly connected to the patient's symptoms, or, the expert evaluating physician, based on the full diagnostic assessment feels that an alternative syndrome is more likely than PD  *must provide value []  Yes   [x]  No        Red Flags    1. Rapid progression of gait impairment requiring regular use of wheelchair within 5 y of onset   *must provide value   []  Yes   [x]  No      2. A complete absence of progression of motor symptoms or signs over 5 or more y unless stability is related to treatment   *must provide value   []  Yes   [x]  No      3. Early bulbar dysfunction: severe dysphonia or dysarthria (speech unintelligible most of   the time) or severe dysphagia (requiring soft food, NG tube, or gastrostomy feeding) within first 5 y   *must provide value   []  Yes   [x]  No      4. Inspiratory respiratory dysfunction: either diurnal or nocturnal inspiratory stridor or frequent inspiratory signs   *must provide value   []  Yes   [x]  No        5. Severe autonomic failure in the first 5 y of disease. This can include:   a) Orthostatic hypotension--orthostatic decrease of blood pressure within 3 min of standing by at least 30 mm Hg systolic or 15 mm Hg diastolic, in the absence of dehydration, medication, or other diseases that could plausibly explain autonomic dysfunction, OR     b) Severe urinary retention or urinary incontinence in the first 5 y of disease (excluding long-standing or small amount stress incontinence in women), that is not simply functional incontinence. In men, urinary retention must not be attributable to prostate disease,  and must be associated with erectile dysfunction   *must provide value   []  Yes   [x]  No      6. Recurrent (>1/y) falls because of impaired balance within 3 y of onset   *must provide value   []  Yes   [x]  No      7. Disproportionate anterocollis (dystonic) or contractures of hand or feet within the first 10y   *must provide value   []  Yes   [x]  No      8. Absence of any of the common nonmotor features of disease despite 5 y disease duration. These include sleep dysfunction (sleep-maintenance insomnia, excessive daytime somnolence, symptoms of REM sleep behavior disorder), autonomic dysfunction (constipation, daytime urinary urgency, symptomatic orthostasis), hyposmia, or psychiatric dysfunction   (depression, anxiety, or hallucinations)   *must provide value   []  Yes   [x]  No      9. Otherwise-unexplained pyramidal tract signs, defined as pyramidal weakness or clear pathologic hyperreflexia (excluding mild reflex asymmetry and isolated extensor plantar response)   *must provide value   []  Yes   [x]  No      10. Bilateral symmetric parkinsonism. The patient or caregiver reports bilateral symptom onset with no side predominance, and no side predominance is observed on objective examination   *must provide value   []  Yes   [x]  No        Criteria Application:    CLINICAL DIAGNOSIS    Most likely primary diagnosis   *must provide value   [x] PD   [] Alzheimer's disease   [] Chromosome-17 frontotemporal dementia   [] Corticobasal degeneration   [] Dementia with Lewy bodies   [] Dopa-responsive dystonia   [] Essential tremor   [] Hemiparkinson/hemiatrophy syndrome   [] Juvenile autosomal recessive parkinsonism   [] Motor neuron disease with parkinsonism   [] Multiple system atrophy   [] Neuroleptic-induced parkinsonism   [] Normal pressure hydrocephalus   [] Progressive supranuclear palsy   [] Psychogenic illness   [] Vascular parkinsonism   [] No PD nor other neurological disorder   [] Spinocerebellar Ataxia  (SCA)   [] Other neurological disorders(s)(specify)      Most likely primary diagnosis- Other   *must provide value N/A (Enter N/A if not applicable)    Diagnostic certainty of PD (Numeric (0-100%))                     99  *must provide value   PARKINSON DISEASE HISTORY    Year of motor symptom onset (YYYY)                                     2022  *must provide value   Year of diagnosis (YYYY)                                                       2022  *must provide value   Has the patient ever been treated with levodopa?   *must provide value   [x] Yes   [] No    Year of levodopa initiation (YYYY): 2022         First motor symptom   *must provide value   [x] Tremor   [] Micrographia   [] stiffness/frozen shoulder   [] Impaired manual dexterity   [] Gait disorder   [] General slowing up   [] Other        GENERAL STATUS EVALUATION   Global Versus Factor-Related Impression of Severity in Parkinson's Disease: A New Clinimetric Index (CISI-PD)    Motor Signs   *must provide value   []  Normal   []  Very mild   []  Mild   [x]  Mild to moderate   []  Moderate   []  Severe   []  Very severe      Disability   *must provide value   []  Normal   []  Minimal slowness and/or clumsiness   []  Slowness and/or clumsiness; no limitations   [x]  Limitation for demanding activities; does not need help for basic ADL   []  Limitation to perform basic ADL; help is required for some basic ADL   []  Great limitation to perform basic ADL; help is required for most or all basic ADL   []  Severely disabled; helpless; complete assistance needed      Motor complications (dyskinesia and fluctuations)   *must provide value   [x]  Not at all   []  Very mild   []  Mild   []  Mild to moderate   []  Moderate   []  Severe   []  Very severe      Cognitive status   *must provide value   []  Normal   []  Slowness and/or minimal cognitive problems   [x]  Mild cognitive problems; no limitations   []  Mild to moderate cognitive problems; does not need  help for basic ADL   []  Moderate cognitive problems; help is required for some basic ADL   []  Severe cognitive problems; help is required for most or all basic ADL

## 2025-01-13 DIAGNOSIS — Z00.00 ENCOUNTER FOR MEDICARE ANNUAL WELLNESS EXAM: ICD-10-CM

## 2025-02-20 ENCOUNTER — TELEPHONE (OUTPATIENT)
Dept: INTERNAL MEDICINE | Facility: CLINIC | Age: 70
End: 2025-02-20
Payer: MEDICARE

## 2025-02-20 NOTE — TELEPHONE ENCOUNTER
Spoke with Pt. Called regarding Virtual Annual wellness visit Appt for 2/27 at 1pm. Relayed that Daphne Had to move the appointment from 1pm to 2pm. Pt stated that was fine.

## 2025-02-27 ENCOUNTER — TELEPHONE (OUTPATIENT)
Dept: FAMILY MEDICINE | Facility: CLINIC | Age: 70
End: 2025-02-27
Payer: MEDICARE

## 2025-02-27 ENCOUNTER — TELEPHONE (OUTPATIENT)
Dept: ADMINISTRATIVE | Facility: CLINIC | Age: 70
End: 2025-02-27
Payer: MEDICARE

## 2025-02-27 NOTE — TELEPHONE ENCOUNTER
Called pt; no answer; left message informing patient I was calling to confirm pt's virtual EAWV today at 2:00pm and to see if pt needed any help with setting up for virtual visit or e-pre check and to login 10 minutes prior to scheduled appt; left my name & number for pt to return my call if pt had any questions or concerns; sent message through portal

## 2025-05-05 NOTE — PROGRESS NOTES
Name: Brittany Modi  MRN: [id number]   CSN: [id number]      Date: 05/06/2025    Referring physician:  No referring provider defined for this encounter.    Chief Complaint: tremor       Interval History:  - cd/ld finds it helpful   - just started magnesium glycinate   - sleeps well for the most part   - some trouble with losing train of thought in the middle of a conversation   - tremors are much better    - takes metamucil daily for constipation, has improved a little bit   - having a BM once a week   - tried miralax, didn't find it to be helpful   - cd/ld 1 tab TID   - water aerobics 3-4 days a week   - walks for exercise   - body feels hot at times, mostly at 2-3 am   - low back pain has improved as well   - more emotional than before, worried about the future with PD   - daughter is a therapist, has a good support system         Oct 2024  - trial of requip for rls, this has been helpful   - had two ED visits for LE pain, in lower back and R thigh    - no inciting events prior to   - has not had spasms since   - back to taking cd/ld 1 tab TID, felt that 1.5 -- made her feel like her throat was tight   - no falls   - symptoms relatively well-controlled   - does water aerobics 3-4 times a week   - tries to walk every day   - constipation is still an issue -- takes miralax 3-4 times a week   - tried melatonin for sleep but did not find it to be helpful             July 2024  - cd/ld 1 tab TID  - restless leg movements at night, not relieved by levodopa   - urge to move legs in the evening   - 7-2-11 pm   - no falls since last visit   - never received rytary   - has to get up and walk because of uncomfortable feeling in her legs   - drinks pickle juice which helps sometimes   - going to the gym 6-7 times a week, does water aerobics 4 times a week         April 2024  - not sleeping well, urinating frequently   - cd/ld 1 tab TID -- still very helpful for tremors  7 - 2- 10   - takes melatonin 10 mg   - no falls but some  unsteadiness whenever she goes from sitting to standing   - recently joined gym, doing water aerobics   - going 4 days a week   - no nausea with cd/ld   - headaches after she takes a dose of cd/ld   - BP has been stable   - constipation is an issue   - fiber was not helpful, daily miralax not helpful  - BM once a week or every other week   - drinking water, eating fruits and greens   - she was on linzess in the past, has seen GI in the past (last saw in 2022)   - some tightness in her neck with 1 tab TID but other benefits outweigh the side effects  - she can tell whenever she is due for a dose           From May 2023  - ldopa trial last visit   - accompanied by    - cd/ld 1 tab TID made tremors go away completely but caused nausea   - now back down to 1/2 tab TID, helping with tremors but still noticeable   - some hot flashes and sweating after she takes cd/ld   - tossing and turning, cannot sleep   - once she falls asleep she is good   - she takes melatonin 10 mg, takes right before she goes to bed   - no falls   - not much exercise   - feels like she has generalized body aches, improves some with ldopa (Even more so whenever she took the full tablet)  - she takes it on an empty stomach    - if she is late to take a dose, lasts about 8 hours   - tightness in the neck is better with ldopa, but with 1 tab felt like it was worse   - frequent urination at night         From Feb 2023 Brittany Modi is a R handed 69 y.o. female with a medical issues significant for GERD who presents for tremors. Referral from Dr. Dulce Elder and Dr. Edvni Love. First noticed tremor in May 2022. Gradual onset. First started off as a fine tremor, at times she didn't notice it. Other people would point it out. She notes that years ago (at least 10 years) she had a head tremor and was diagnosed with dystonia and has since resolved. Tremor started in left hand, only in the left hand. No tremor in the R hand. Denies tremor elsewhere.  "Notices tremor with rest, action and posture. Denies imbalance or falls. She does note that she has slowed down over the past year. She does not feel that she can suppress the tremor, although at times she has been able to stop tremor by grabbing her left hand with her right hand. Sometimes will raise her L arm above her head and it will momentarily stop but then it will re-emerge. She feels like something is around her neck, uncomfortable. Tremor is exacerbated by emotion and stress.     Does not exercise.     She tried inderal in the past but was not beneficial for this tremor.     Does not consume EtOH.       Retired RN.     Family History: none     Neuroleptic Exposure: none       From Jan 2023 with Dr. Elder  Interval history 01/09/2023   Ms Brittany Modi is a 68 y/o R handed F w PMH HTN presenting as follow up for L hand tremor, initially seen on 10/10/22. At that time her tremor was non rhythmic with varying amplitude. No Parkinsonian features noted and there was concern for FND given emotional trigger. She had MRI brain that was unremarkable.   Today she refers, that she is worse. Tremors are more severe, more debilitating. Still only on her left hand but patient it varies degree day to day. She can sometimes control it by holding it or lifting it up. She is also having finger tapping now. She says that the tremors are also influenced by her emotions; she says "if I watch an action movie it gets worse or if I get into an argument."  She refers that she is stressed due to the tremor, but can not think of any underlying stressor that could be provoking it.  She is able to use the hand, like hold a glass.   She denies any vision changes, voice change, dysphagia, dysarthrtia, gate instability, ataxia, weakness.   She takes Vit B, C and D. Estro Ten for hot flashes.            Initial HPI:   Ms. Brittany Modi is a 67 y.o. R handed female w PMH HTN, constipation presenting as initial evaluation for left hand " tremor. She was previously seen by Dr. Arreola and Dr. Arciniega (last 5/16/22) for memory problems and tremors, but she wants to change neurologist.   She is having involuntary movement on left hand, started 5 months, she says that now they are more vigorous and more often than it was, it is daily and constant, its worse at rest and subsided with action or lifting. Sami in May when she saw Dr Arreola they were finer movements and not as debilitating. She refers that if feels like her muscles feel tired and they are stiff. No aching, no pain. Weakness at the fingers and worsens at the end of day, some numbness but no tingling. She can slow it down if she holds it.. Triggers/exacerbated if she is upset/anxious. She refers that she has coordination/balance issues and she has not been able to walk a line for 1 month.   Denies any other medical issues other than HTN and recently constipation. No family history of neurodegenerative/movement disorders. Non smoker, no alcohol use    Nonmotor/Premotor ROS:  Anosmia: poor sense of smell   Dysarthria/Hypophonia: none   Dysphagia/Sialorrhea: none   Depression: yes, apathy   Cognitive slowing: memory is declining, speaking and forgets what she wants to say -- trouble with word finding   Hallucinations: none   Urinary changes: frequency at night   Constipation: chronic   Falls: none   Micrographia: none   Sleep issues:  -RBD: she has been told she talks in her sleep       Review of Systems:   Review of Systems   Constitutional:  Negative for chills, fever and malaise/fatigue.   HENT:  Negative for hearing loss.    Eyes:  Negative for blurred vision and double vision.   Respiratory:  Negative for cough, shortness of breath and stridor.    Cardiovascular:  Negative for chest pain and leg swelling.   Gastrointestinal:  Positive for constipation. Negative for diarrhea and nausea.   Genitourinary:  Positive for frequency. Negative for urgency.   Musculoskeletal:  Negative for falls.  "  Skin:  Negative for itching and rash.   Neurological:  Positive for tremors. Negative for dizziness, loss of consciousness and weakness.   Psychiatric/Behavioral:  Negative for hallucinations and memory loss.            Past Medical History: The patient  has a past medical history of Arthritis, Chest pain, and GERD (gastroesophageal reflux disease).    Social History: The patient  reports that she has never smoked. She has never been exposed to tobacco smoke. She has never used smokeless tobacco. She reports that she does not drink alcohol and does not use drugs.    Family History: Their family history includes Asthma in her father; Breast cancer in her sister; Cancer in her sister; Diabetes in her mother; Hypertension in her brother, brother, mother, sister, sister, sister, and son; Hypothyroidism in her mother.    Allergies: Patient has no known allergies.     Meds:   Current Outpatient Medications on File Prior to Visit   Medication Sig Dispense Refill    magnesium hydroxide 400 mg/5 ml (MILK OF MAGNESIA) 400 mg/5 mL Susp Take 30 mLs by mouth daily as needed.      rOPINIRole (REQUIP) 0.25 MG tablet Take 1 tablet (0.25 mg total) by mouth 2 (two) times a day. 60 tablet 11    [DISCONTINUED] carbidopa-levodopa  mg (SINEMET)  mg per tablet Take 1 tablet by mouth 3 (three) times daily. 270 tablet 3    [DISCONTINUED] levocetirizine (XYZAL) 5 MG tablet Take 1 tablet (5 mg total) by mouth every evening. May substitute for Zyrtec 10 mg daily if Xyzal is not affordable. for 14 days 14 tablet 0     No current facility-administered medications on file prior to visit.       Exam:  /79   Pulse 85   Ht 5' 5" (1.651 m)   Wt 80.7 kg (178 lb)   BMI 29.62 kg/m²     Constitutional  Well-developed, well-nourished, appears stated age   Ophthalmoscopic  No papilledema with no hemorrhages or exudates bilaterally   Cardiovascular  Radial pulses 2+ and symmetric, no LE edema bilaterally   Neurological    * Mental " status  MOCA =      - Orientation  Oriented to person, place, time, and situation     - Memory   Intact recent and remote     - Attention/concentration  Attentive, vigilant during exam     - Language  Naming & repetition intact, +2-step commands     - Fund of knowledge  Aware of current events     - Executive  Well-organized thoughts     - Other     * Cranial nerves       - CN II  PERRL, visual fields full to confrontation     - CN III, IV, VI  Extraocular movements full, normal pursuits and saccades     - CN V  Sensation V1 - V3 intact     - CN VII  Face strong and symmetric bilaterally     - CN VIII  Hearing intact bilaterally     - CN IX, X  Palate raises midline and symmetric     - CN XI  SCM and trapezius 5/5 bilaterally     - CN XII  Tongue midline   * Motor  Muscle bulk normal, strength 5/5 throughout   * Sensory   Intact to light touch    * Coordination  No dysmetria with finger-to-nose or heel-to-shin   * Gait  See below.   * Deep tendon reflexes  3+ and symmetric throughout   Babinski downgoing bilaterally   * Specialized movement exam  No hypophonic speech.    No facial masking.   Mild L > R (only in the wrist on the R) cogwheel rigidity.     L bradykinesia with finger taps, finger flicks,    Resting tremor of L hand   Tremor much improved today    Re-emerges with posture and gait eval -- improved    Mild LLE and L hand dyskinesia with distraction    No other chorea, athetosis, myoclonus, or tics.   No motor impersistence.   Normal-based gait.   No shortened stride length.   Decreased L arm swing    No postural instability.     Subtle head tremor, no-no -- dystonic      Laboratory/Radiological:  - Results:  No visits with results within 3 Month(s) from this visit.   Latest known visit with results is:   Lab Visit on 11/12/2024   Component Date Value Ref Range Status    Drug Study Test Name 11/12/2024 Drug Study   Final    Drug Study Specimen Type 11/12/2024 n/a   Final    Drug Study Test Result 11/12/2024  Result sent directly to physician   Final       - Independent review of images:      - Independent review of consultant's notes: Jael Love     ASSESSMENT/PLAN:  Parkinson's disease  - tremor-dominant, L sided, L > R cogwheel rigidity, L bradykinesia   - non motor features of alpha-synucleinopathy present -- anosmia, constipation, possible RBD  - currently on 1 tab TID     (Side effects with 1 tab cd,ld but 1/2 tab is insufficient)   - avoid amantadine in setting of cognitive changes   - continue exercising   - BIG therapy  - monitoring for PBA, encouraged talk therapy. Declines ssri.     2. Constipation   - very infrequent BM  - has failed conservative treatment and linzess, has seen GI in the past   - rec'd following up with GI       3. Restless leg syndrome  - continue requip, takes prn       4. Low back pain   - consider lumbar mri      5. Sleep disturbance  - failed melatonin  - rec'd magnesium glycinate qhs       6. Memory change  - reversible labs  - brain MRI  - neuropsych testing         Orders Placed This Encounter    MRI Brain Without Contrast    Vitamin B12 Deficiency Panel    Vitamin B1    TSH    Treponema Pallidium Antibodies IgG, IgM    Ambulatory referral/consult to Adult Neuropsychology    Ambulatory referral/consult to Physical/Occupational Therapy    carbidopa-levodopa  mg (SINEMET)  mg per tablet         Follow up: in 3 months with [facility]     This is a patient with a serious and complex neurologic diagnosis whose overall, ongoing care is being managed and monitored by me and our Neurology clinic.   As such, since 2024,  is the appropriate add-on code to accompany the other E/M billing for this visit.          Collaborating Physician, Dr. Garvin, was available during today's encounter. Any change to plan along with cosign to appear in the EMR.       I spent 32 minutes with the patient, reviewing past encounters, labs and imaging.        Rachel Rhinehart, PA-C [facility]  Department of Neurology  Movement Disorders

## 2025-05-06 ENCOUNTER — OFFICE VISIT (OUTPATIENT)
Facility: CLINIC | Age: 70
End: 2025-05-06
Payer: MEDICARE

## 2025-05-06 ENCOUNTER — LAB VISIT (OUTPATIENT)
Dept: LAB | Facility: HOSPITAL | Age: 70
End: 2025-05-06
Payer: MEDICARE

## 2025-05-06 VITALS
DIASTOLIC BLOOD PRESSURE: 79 MMHG | BODY MASS INDEX: 29.66 KG/M2 | WEIGHT: 178 LBS | SYSTOLIC BLOOD PRESSURE: 135 MMHG | HEIGHT: 65 IN | HEART RATE: 85 BPM

## 2025-05-06 DIAGNOSIS — R41.3 OTHER AMNESIA: ICD-10-CM

## 2025-05-06 DIAGNOSIS — R41.3 MEMORY CHANGE: ICD-10-CM

## 2025-05-06 DIAGNOSIS — G20.B2 PARKINSON'S DISEASE WITH DYSKINESIA AND FLUCTUATING MANIFESTATIONS: ICD-10-CM

## 2025-05-06 DIAGNOSIS — Z71.89 COUNSELING REGARDING GOALS OF CARE: ICD-10-CM

## 2025-05-06 DIAGNOSIS — R25.1 TREMOR: ICD-10-CM

## 2025-05-06 DIAGNOSIS — G20.B2 PARKINSON'S DISEASE WITH DYSKINESIA AND FLUCTUATING MANIFESTATIONS: Primary | ICD-10-CM

## 2025-05-06 LAB
T PALLIDUM IGG+IGM SER QL: NORMAL
TSH SERPL-ACNC: 1.39 UIU/ML (ref 0.4–4)

## 2025-05-06 PROCEDURE — 99214 OFFICE O/P EST MOD 30 MIN: CPT | Mod: S$GLB,,, | Performed by: PHYSICIAN ASSISTANT

## 2025-05-06 PROCEDURE — 3075F SYST BP GE 130 - 139MM HG: CPT | Mod: CPTII,S$GLB,, | Performed by: PHYSICIAN ASSISTANT

## 2025-05-06 PROCEDURE — 84443 ASSAY THYROID STIM HORMONE: CPT

## 2025-05-06 PROCEDURE — 86593 SYPHILIS TEST NON-TREP QUANT: CPT

## 2025-05-06 PROCEDURE — 82607 VITAMIN B-12: CPT

## 2025-05-06 PROCEDURE — G2211 COMPLEX E/M VISIT ADD ON: HCPCS | Mod: S$GLB,,, | Performed by: PHYSICIAN ASSISTANT

## 2025-05-06 PROCEDURE — 1159F MED LIST DOCD IN RCRD: CPT | Mod: CPTII,S$GLB,, | Performed by: PHYSICIAN ASSISTANT

## 2025-05-06 PROCEDURE — 1157F ADVNC CARE PLAN IN RCRD: CPT | Mod: CPTII,S$GLB,, | Performed by: PHYSICIAN ASSISTANT

## 2025-05-06 PROCEDURE — 84425 ASSAY OF VITAMIN B-1: CPT

## 2025-05-06 PROCEDURE — 1160F RVW MEDS BY RX/DR IN RCRD: CPT | Mod: CPTII,S$GLB,, | Performed by: PHYSICIAN ASSISTANT

## 2025-05-06 PROCEDURE — 1101F PT FALLS ASSESS-DOCD LE1/YR: CPT | Mod: CPTII,S$GLB,, | Performed by: PHYSICIAN ASSISTANT

## 2025-05-06 PROCEDURE — 36415 COLL VENOUS BLD VENIPUNCTURE: CPT

## 2025-05-06 PROCEDURE — 3288F FALL RISK ASSESSMENT DOCD: CPT | Mod: CPTII,S$GLB,, | Performed by: PHYSICIAN ASSISTANT

## 2025-05-06 PROCEDURE — 3078F DIAST BP <80 MM HG: CPT | Mod: CPTII,S$GLB,, | Performed by: PHYSICIAN ASSISTANT

## 2025-05-06 PROCEDURE — 99999 PR PBB SHADOW E&M-EST. PATIENT-LVL III: CPT | Mod: PBBFAC,,, | Performed by: PHYSICIAN ASSISTANT

## 2025-05-06 PROCEDURE — 1126F AMNT PAIN NOTED NONE PRSNT: CPT | Mod: CPTII,S$GLB,, | Performed by: PHYSICIAN ASSISTANT

## 2025-05-06 PROCEDURE — 3008F BODY MASS INDEX DOCD: CPT | Mod: CPTII,S$GLB,, | Performed by: PHYSICIAN ASSISTANT

## 2025-05-06 RX ORDER — CARBIDOPA AND LEVODOPA 25; 100 MG/1; MG/1
1 TABLET ORAL 3 TIMES DAILY
Qty: 270 TABLET | Refills: 3 | Status: SHIPPED | OUTPATIENT
Start: 2025-05-06

## 2025-05-06 RX ORDER — ADHESIVE BANDAGE
30 BANDAGE TOPICAL DAILY PRN
COMMUNITY

## 2025-05-07 LAB — VIT B12 SERPL-MCNC: 543 NG/L (ref 180–914)

## 2025-05-13 ENCOUNTER — RESULTS FOLLOW-UP (OUTPATIENT)
Facility: CLINIC | Age: 70
End: 2025-05-13

## 2025-05-13 LAB — W VITAMIN B1: 57 UG/L

## 2025-05-13 NOTE — PROGRESS NOTES
"Outpatient Rehab    Physical Therapy Evaluation (only)    Patient Name: Xenia Modi  MRN: [id number]  YOB: 1955  Encounter Date: 5/14/2025    Therapy Diagnosis:   Encounter Diagnoses   Name Primary?    Impaired functional mobility, balance, gait, and endurance Yes    Impairment of balance      Physician: Mercedes Hameed P*    Physician Orders: Eval and Treat  Medical Diagnosis: Parkinson's disease with dyskinesia and fluctuating manifestations    Visit # / Visits Authorized:  1 / 1  Insurance Authorization Period: 5/6/2025 to 5/6/2026  Date of Evaluation: 5/14/2025  Progress Note Due: 6/14/2025  Plan of Care Certification: 5/14/2025 to 7/9/2025 (extended to allow for scheduling)     Time In: 0845   Time Out: 0927  Total Time (in minutes): 42   Total Billable Time (in minutes): 42    Intake Outcome Measure for FOTO Survey    Therapist reviewed FOTO scores for Xenia Modi on 5/14/2025.   FOTO report - see Media section or FOTO account episode details.     Intake Score: 56%    Precautions:     Standard      Subjective   History of Present Illness  Xenia is a 69 y.o. female      The patient reports a medical diagnosis of Parkinson's disease.            Dominant Hand: Right  History of Present Condition/Illness: She has gotten slower physically and mentally. Her memory is "horrible" even though she knows there are people who are worse off. She has the cane because she gets muscle spasms in the right leg as well as her back pain (in the middle). She denies any falls. She doesn't have any issues with getting up and out of bed. When she sits for too long (over a half hour) she will have trouble then getting up to standing because her muscles get really tight. She feels "robotic" when she walks - like she is stiff and hard to make her steps the way she wants them. She has to really push herself. She has been doing water aerobic at [facility] for ~3 days/week. She likes it and she feels like it is helping. "     Pain     Patient reports a current pain level of 3/10. Pain at best is reported as 2/10. Pain at worst is reported as 8/10.   Location: mid back  Clinical Progression (since onset): Stable  Pain Qualities: Aching  Pain-Relieving Factors: Stretching  Pain-Aggravating Factors: Sitting, Walking         Treatment History  Treatments  Previously Received Treatments: Yes  Previous Treatments: Physical therapy    Living Arrangements  Living Situation  Housing: Home independently  Living Arrangements: Alone  Support Systems: Friends/neighbors    Equipment/Treatments  Mobility Equipment: Straight cane        Employment  Employment Status: Retired   Registered nurse       Past Medical History/Physical Systems Review:   Brittany Modi  has a past medical history of Arthritis, Chest pain, and GERD (gastroesophageal reflux disease).    Brittany Modi  has a past surgical history that includes Appendectomy (2010); Tubal ligation; Abdominoplasty; Knee surgery (Right, 12/2015); Colonoscopy (N/A, 08/22/2019); Laparoscopic total hysterectomy (N/A, 12/05/2019); Cystoscopy (N/A, 12/05/2019); and Bilateral salpingo-oophorectomy (BSO) (12/05/2019).    Brittany has a current medication list which includes the following prescription(s): carbidopa-levodopa  mg, magnesium hydroxide 400 mg/5 ml, and ropinirole.    Review of patient's allergies indicates:  No Known Allergies     Objective            Hip Strength - Planes of Motion   Right Strength Right Pain Left Strength Left  Pain   Flexion (L2) 5   5     Extension 5   5     ABduction 4+   4+     ADduction 5   5     Internal Rotation           External Rotation               Knee Strength   Right Strength Right Pain Left Strength Left  Pain   Flexion (S2) 5   5     Prone Flexion           Extension (L3) 5   4+            Ankle/Foot Strength - Planes of Motion   Right Strength Right Pain Left Strength Left  Pain   Dorsiflexion (L4) 5   5     Plantar Flexion (S1) 5   5      Inversion           Eversion           Great Toe Flexion           Great Toe Extension (L5)           Lesser Toes Flexion           Lesser Toes Extension                  Coordination  Balance  Intact: Static Sitting, Dynamic Sitting, and Static Standing       Quality of Movement Observations: Tremor  Tremor Types: Resting       Coordination Tests  Intact: Right Finger to Nose, Right Rapid Alternating Movements (Pronation/Supination), and Left Finger to Nose  Impaired: Left Rapid Alternating Movements (Pronation/Supination)                Transfers Assessment  Sit to Stand Assistance: Independent  Toilet/Commode Transfer Assistance: Independent  Bathtub/Shower Transfer Assistance: Independent  Car Transfer Assistance: Independent    Bed Mobility Assessment  Rolling Assistance: Independent  Sidelying to Sit Assistance: Independent  Sit to Sidelying Assistance: Independent  Scooting to Edge of Bed Assistance: Independent  Bridge/Boost to Head of Bed Assistance: Independent      Four Stage Balance Test  Narrow Base of Support: 30 sec sec  Tandem Stand - Right Foot in Front: 6 sec  Tandem Stand - Left Foot in Front: 30 sec        Single Leg Stand - Right Foot: 12 sec  Single Leg Stand - Left Foot: 11 sec       Timed Up & Go (TUG)  Time: 6.9 seconds     An older adult who takes >=12 seconds to complete the TUG is at risk for falling.       Sit to Stand Testing  The patient completed 5 sit to stand transfers in 9 sec. no arms The patient completed 15 repetitions of a sit to stand transfer in 30 seconds. no arms         Ambulation Assistance Required  Surface With  Assistive Device Without Assistive Device Details   Level Independent Independent      Uneven Independent Independent     Curb Independent Independent       Stairs Assistance Required   Assistance Level Upper Extremity Support Pattern   Ascending Independent Without rails     Descending Independent Without rails          Gait Analysis  Base of Support:  Normal                 Functional Gait Assessment:   1. Gait on level surface =  3   (3) Normal: less than 5.5 sec, no A.D., no imbalance, normal gait pattern, deviates< 6in   (2) Mild impairment: 7-5.6 sec, uses A.D., mild gait deviations, or deviates 6-10 in   (1) Moderate impairment: > 7 sec, slow speed, imbalance, deviates 10-15 in.   (0) Severe impairment: needs assist, deviates >15 in, reach/touch wall  2. Change in Gait Speed = 3   (3) Normal: smooth change w/o loss of balance or gait deviation, deviates < 6 in, significant difference between speeds   (2) Mild impairment: changes speed, but demonstrates mild gait deviations, deviates 6-10 in, OR no deviations but unable to significantly speed, OR uses A.D.   (1) Moderate impairment: minor changes to speed, OR changes speed w/ significant deviations, deviates 10-15 in, OR  Changes speed , but loses balance & recovers   (0) Severe impairment: cannot change speed, deviates >15 in, or loses balance & needs assist  3. Gait with horizontal head turns  = 3   (3) Normal: no change in gait, deviates <6 in   (2) Mild impairment: slight change in speed, deviates 6-10 in, OR uses A.D.   (1) Moderate impairment: moderate change in speed, deviates 10-15 in   (0) Severe impairment: severe disruption of gait, deviates >15in  4. Gait with vertical head turns = 3   (3) Normal: no change in gait, deviates <6 in   (2) Mild impairment: slight change in speed, deviates 6-10 in OR uses A.D.   (1) Moderate impairment: moderate change in speed, deviates 10-15 in   (0) Severe impairment: severe disruption of gait, deviates >15 in  5. Gait with pivot turns = 3   (3) Normal: performs safely in 3 sec, no LOB   (2) Mild impairment: performs in >3 sec & no LOB, OR turns safely & requires several steps to regain LOB   (1) Moderate impairment: turns slow, OR requires several small steps for balance following turn & stop   (0) Severe impairment: cannot turn safely, needs assist  6. Step over  obstacle = 3   (3) Normal: steps over 2 stacked boxes w/o change in speed or LOB   (2) Mild impairment: able to step over 1 box w/o change in speed or LOB   (1) Moderate impairment: steps over 1 box but must slow down, may require VC   (0) Severe impairment: cannot perform w/o assist  7. Gait with Narrow MAI = 1   (3) Normal: 10 steps no staggering   (2) Mild impairment: 7-9 steps   (1) Moderate impairment: 4-7 steps   (0) Severe impairment: < 4 steps or cannot perform w/o assist  8. Gait with eyes closed = 2   (3) Normal: < 7 sec, no A.D., no LOB, normal gait pattern, deviates <6 in   (2) Mild impairment: 7.1-9 sec, mild gait deviations, deviates 6-10 in   (1) Moderate impairment: > 9 sec, abnormal pattern, LOB, deviates 10-15 in   (0) Severe impairment: cannot perform w/o assist, LOB, deviates >15in  9. Ambulating Backwards = 3   (3) Normal: no A.D., no LOB, normal gait pattern, deviates <6in   (2) Mild impairment: uses A.D., slower speed, mild gait deviations, deviates 6-10 in   (1) Moderate impairment: slow speed, abnormal gait pattern, LOB, deviates 10-15 in   (0) Severe impairment: severe gait deviations or LOB, deviates >15in  10. Steps = 3   (3) Normal: alternating feet, no rail   (2) Mild Impairment: alternating feet, uses rail   (1) Moderate impairment: step-to, uses rail   (0) Severe impairment: cannot perform safely    Score 27/30     Score:   <22/30 fall risk   <20/30 fall risk in older adults   <18/30 fall risk in Parkinsons     6-Minute Walk Test  Distance = 1428  Assistive device = none   RPE = 11/20    Self-Selected Gait Speed: 6 meters in 4.58 seconds, 1.31 m/s   Fast Gait Speed: 6 meters in 4.01 seconds, 1.5 m/s     MCTSIB:  1. Eyes Open/feet together/Firm: 30 seconds  2. Eyes Closed/feet together/Firm: 29 seconds - min sway  3. Eyes Open/feet together/Foam: 30 seconds  4. Eyes Closed/feet together/Foam: 30 seconds - mod sway      Time Entry(in minutes):  PT Evaluation (Low) Time Entry:  42    Assessment & Plan   Assessment  Xenia presents with a condition of Low complexity.   Presentation of Symptoms: Stable  Will Comorbidities Impact Care: No       Functional Limitations: Ambulating on uneven surfaces, Decreased ambulation distance/endurance, Gait limitations, Getting off the floor, Maintaining balance, Painful locomotion/ambulation, Standing tolerance  Impairments: Abnormal coordination, Abnormal gait, Impaired balance, Impaired physical strength, Lack of appropriate home exercise program, Pain with functional activity  Personal Factors Affecting Prognosis: Schedule, Pain    Patient Goal for Therapy (PT): to have better balance  Prognosis: Excellent  Assessment Details: Ms. Michaels presents to neuro PT evaluation today with dx of Parkinson's Disease. This PT saw her ~2 years ago for same diagnosis and was able to utilize previous evaluation scores to compare progression. Today she demonstrates strength WFL in bilateral lower extremities though with some minor deficits noted. Her FGA score, SSWS, 30 sec STS, and single limb stance times are better than previous assessments. The biggest change is her static standing balance with the mCTSIB, though still passing several conditions with only minimal sway noted. She would benefit from a short bout of PT via the LSVT Big protocol as she is high level and could use a strong focus on both intensity and balance reactions in addition to amplitude.     Plan  From a physical therapy perspective, the patient would benefit from: Skilled Rehab Services    Planned therapy interventions include: Therapeutic exercise, Therapeutic activities, Neuromuscular re-education, Cognitive functional training, Community/work reintegration, and Gait training.    Planned modalities to include: Cryotherapy (cold pack) and Thermotherapy (hot pack).        Visit Frequency: 4 times Per Week for 4 Weeks.       This plan was discussed with Patient.   Discussion participants: Agreed Upon  Plan of Care             Patient's spiritual, cultural, and educational needs considered and patient agreeable to plan of care and goals.     Education  Education was done with Patient. The patient's learning style includes Listening. The patient Verbalizes understanding.         LSVT Big therapy versus traditional PT; purpose of PT for tx and management of PD symptoms       Goals:   Active       STG = LTG       Pt will improve postural control with MCTSIB condition 2 score of at least 30s with no sway for decreased fall risk with standing ADLs.         Start:  05/14/25    Expected End:  07/09/25            Pt will improve FOTO score to >/= 60% for improved self perception of functional mobility.        Start:  05/14/25    Expected End:  07/09/25            Pt will improve Functional Gait Assessment (FGA) score to at least 29/30 for increased independence with home and community ambulation.         Start:  05/14/25    Expected End:  07/09/25            Pt will demonstrate independence with LSVT Big home exercise program to maintain gains made in PT.        Start:  05/14/25    Expected End:  07/09/25                Aniya Bowden, PT

## 2025-05-14 ENCOUNTER — CLINICAL SUPPORT (OUTPATIENT)
Dept: REHABILITATION | Facility: HOSPITAL | Age: 70
End: 2025-05-14
Payer: MEDICARE

## 2025-05-14 DIAGNOSIS — R26.89 IMPAIRMENT OF BALANCE: ICD-10-CM

## 2025-05-14 DIAGNOSIS — Z74.09 IMPAIRED FUNCTIONAL MOBILITY, BALANCE, GAIT, AND ENDURANCE: Primary | ICD-10-CM

## 2025-05-14 PROCEDURE — 97161 PT EVAL LOW COMPLEX 20 MIN: CPT | Mod: PO

## 2025-05-16 ENCOUNTER — PATIENT MESSAGE (OUTPATIENT)
Facility: CLINIC | Age: 70
End: 2025-05-16
Payer: MEDICARE

## 2025-05-23 ENCOUNTER — PATIENT MESSAGE (OUTPATIENT)
Facility: CLINIC | Age: 70
End: 2025-05-23
Payer: MEDICARE

## 2025-05-23 DIAGNOSIS — M54.16 LUMBAR RADICULOPATHY, CHRONIC: Primary | ICD-10-CM

## 2025-05-23 RX ORDER — GABAPENTIN 300 MG/1
300 CAPSULE ORAL 3 TIMES DAILY
Qty: 90 CAPSULE | Refills: 11 | Status: SHIPPED | OUTPATIENT
Start: 2025-05-23 | End: 2026-05-23

## 2025-05-23 NOTE — TELEPHONE ENCOUNTER
Spoke to patient. In response to myc message. She is having back pain and immobility since last night. Advised that [facility] has sent in Gabapentin 300mg PO TID. Explained desired effect and side effects. Verbalized understanding and thanks.    Can not move this morning. She is trying ice or heat. C/o Grinding pain to back. Will continue to alternate ice and heat. Has family member at home with her.    Patient is aware Gabapentin was sent to:  [id number] [place] [id number] AT [facility] [address]              Would like to do lumbar MRI without contrast at [facility]. Or [facility]  Would like to do Brain MRI on same day.   Okay to schedule both and send thru portal/  Also advised patient of referral to Pain Mgmt.   Verbalized understanding and thanks.

## 2025-05-24 ENCOUNTER — NURSE TRIAGE (OUTPATIENT)
Dept: ADMINISTRATIVE | Facility: CLINIC | Age: 70
End: 2025-05-24
Payer: MEDICARE

## 2025-05-24 NOTE — TELEPHONE ENCOUNTER
Pt's daughter reports lower back pain that radiates down bilateral legs that began 2 days ago. Pt spoke with her neurologist yesterday and was prescribed gabapentin but pt reports no relief from medication. Current pain of 8/10 at rest and 10/10 with movement. Pt reports she is unable to walk. Pt has Hx of parkinsons. Care advice to go to ED/UCC now or PCP triage. On call neurology, Bala Krishna, contacted regarding pt. MD verbalized to send her a secure chat with pt's information so she can review. Secure chat sent to MD. MD verbalized pt should be evaluated in the ED. Pt's daughter made aware and verbalizes understanding.   Reason for Disposition   Weakness of a leg or foot (e.g., unable to bear weight, dragging foot)    Additional Information   Negative: Passed out (e.g., fainted, lost consciousness, blacked out and was not responding)   Negative: Shock suspected (e.g., cold/pale/clammy skin, too weak to stand, low BP, rapid pulse)   Negative: Sounds like a life-threatening emergency to the triager   Negative: [1] SEVERE back pain (e.g., excruciating) AND [2] sudden onset AND [3] age > 60 years   Negative: [1] Unable to urinate (or only a few drops) > 4 hours AND [2] bladder feels very full (e.g., palpable bladder or strong urge to urinate)   Negative: [1] Loss of bladder or bowel control (urine or bowel incontinence; wetting self, leaking stool) AND [2] new-onset   Negative: Numbness in groin or rectal area (i.e., loss of sensation)   Negative: [1] SEVERE abdominal pain AND [2] present > 1 hour   Negative: [1] Abdominal pain AND [2] age > 60 years    Protocols used: Back Pain-A-

## 2025-05-25 ENCOUNTER — HOSPITAL ENCOUNTER (OUTPATIENT)
Facility: HOSPITAL | Age: 70
Discharge: HOME | End: 2025-05-26
Attending: EMERGENCY MEDICINE | Admitting: EMERGENCY MEDICINE
Payer: MEDICARE

## 2025-05-25 DIAGNOSIS — M54.31 BILATERAL SCIATICA: ICD-10-CM

## 2025-05-25 DIAGNOSIS — M54.32 BILATERAL SCIATICA: ICD-10-CM

## 2025-05-25 DIAGNOSIS — M54.9 BACK PAIN: Primary | ICD-10-CM

## 2025-05-25 LAB
ABSOLUTE EOSINOPHIL (OHS): 0.01 K/UL
ABSOLUTE MONOCYTE (OHS): 1.76 K/UL (ref 0.3–1)
ABSOLUTE NEUTROPHIL COUNT (OHS): 8.55 K/UL (ref 1.8–7.7)
ALBUMIN SERPL BCP-MCNC: 3.5 G/DL (ref 3.5–5.2)
ALP SERPL-CCNC: 63 UNIT/L (ref 40–150)
ALT SERPL W/O P-5'-P-CCNC: 5 UNIT/L (ref 10–44)
ANION GAP (OHS): 12 MMOL/L (ref 8–16)
AST SERPL-CCNC: 26 UNIT/L (ref 11–45)
BASOPHILS # BLD AUTO: 0.05 K/UL
BASOPHILS NFR BLD AUTO: 0.4 %
BILIRUB SERPL-MCNC: 0.6 MG/DL (ref 0.1–1)
BUN SERPL-MCNC: 15 MG/DL (ref 8–23)
CALCIUM SERPL-MCNC: 9.6 MG/DL (ref 8.7–10.5)
CHLORIDE SERPL-SCNC: 106 MMOL/L (ref 95–110)
CO2 SERPL-SCNC: 25 MMOL/L (ref 23–29)
CREAT SERPL-MCNC: 1 MG/DL (ref 0.5–1.4)
ERYTHROCYTE [DISTWIDTH] IN BLOOD BY AUTOMATED COUNT: 13.9 % (ref 11.5–14.5)
GFR SERPLBLD CREATININE-BSD FMLA CKD-EPI: >60 ML/MIN/1.73/M2
GLUCOSE SERPL-MCNC: 106 MG/DL (ref 70–110)
HCT VFR BLD AUTO: 40.4 % (ref 37–48.5)
HGB BLD-MCNC: 12.7 GM/DL (ref 12–16)
IMM GRANULOCYTES # BLD AUTO: 0.16 K/UL (ref 0–0.04)
IMM GRANULOCYTES NFR BLD AUTO: 1.2 % (ref 0–0.5)
LYMPHOCYTES # BLD AUTO: 2.71 K/UL (ref 1–4.8)
MCH RBC QN AUTO: 30.7 PG (ref 27–31)
MCHC RBC AUTO-ENTMCNC: 31.4 G/DL (ref 32–36)
MCV RBC AUTO: 98 FL (ref 82–98)
NUCLEATED RBC (/100WBC) (OHS): 0 /100 WBC
PLATELET # BLD AUTO: 259 K/UL (ref 150–450)
PMV BLD AUTO: 12.6 FL (ref 9.2–12.9)
POTASSIUM SERPL-SCNC: 3.8 MMOL/L (ref 3.5–5.1)
PROT SERPL-MCNC: 7.5 GM/DL (ref 6–8.4)
RBC # BLD AUTO: 4.14 M/UL (ref 4–5.4)
RELATIVE EOSINOPHIL (OHS): 0.1 %
RELATIVE LYMPHOCYTE (OHS): 20.5 % (ref 18–48)
RELATIVE MONOCYTE (OHS): 13.3 % (ref 4–15)
RELATIVE NEUTROPHIL (OHS): 64.5 % (ref 38–73)
SODIUM SERPL-SCNC: 143 MMOL/L (ref 136–145)
WBC # BLD AUTO: 13.24 K/UL (ref 3.9–12.7)

## 2025-05-25 PROCEDURE — 80053 COMPREHEN METABOLIC PANEL: CPT

## 2025-05-25 PROCEDURE — G0378 HOSPITAL OBSERVATION PER HR: HCPCS

## 2025-05-25 PROCEDURE — 85025 COMPLETE CBC W/AUTO DIFF WBC: CPT

## 2025-05-25 PROCEDURE — 25000003 PHARM REV CODE 250

## 2025-05-25 PROCEDURE — 63600175 PHARM REV CODE 636 W HCPCS

## 2025-05-25 RX ORDER — BISACODYL 10 MG/1
10 SUPPOSITORY RECTAL DAILY PRN
Status: DISCONTINUED | OUTPATIENT
Start: 2025-05-25 | End: 2025-05-26 | Stop reason: HOSPADM

## 2025-05-25 RX ORDER — IBUPROFEN 200 MG
16 TABLET ORAL
Status: DISCONTINUED | OUTPATIENT
Start: 2025-05-25 | End: 2025-05-26 | Stop reason: HOSPADM

## 2025-05-25 RX ORDER — IBUPROFEN 200 MG
24 TABLET ORAL
Status: DISCONTINUED | OUTPATIENT
Start: 2025-05-25 | End: 2025-05-26 | Stop reason: HOSPADM

## 2025-05-25 RX ORDER — KETOROLAC TROMETHAMINE 30 MG/ML
15 INJECTION, SOLUTION INTRAMUSCULAR; INTRAVENOUS
Status: COMPLETED | OUTPATIENT
Start: 2025-05-25 | End: 2025-05-25

## 2025-05-25 RX ORDER — TALC
6 POWDER (GRAM) TOPICAL NIGHTLY PRN
Status: DISCONTINUED | OUTPATIENT
Start: 2025-05-25 | End: 2025-05-26 | Stop reason: HOSPADM

## 2025-05-25 RX ORDER — OXYCODONE HYDROCHLORIDE 5 MG/1
10 TABLET ORAL EVERY 6 HOURS PRN
Refills: 0 | Status: DISCONTINUED | OUTPATIENT
Start: 2025-05-25 | End: 2025-05-25

## 2025-05-25 RX ORDER — ACETAMINOPHEN 325 MG/1
650 TABLET ORAL
Status: COMPLETED | OUTPATIENT
Start: 2025-05-25 | End: 2025-05-25

## 2025-05-25 RX ORDER — OXYCODONE HYDROCHLORIDE 5 MG/1
5 TABLET ORAL EVERY 6 HOURS PRN
Refills: 0 | Status: DISCONTINUED | OUTPATIENT
Start: 2025-05-25 | End: 2025-05-26 | Stop reason: HOSPADM

## 2025-05-25 RX ORDER — ACETAMINOPHEN 325 MG/1
650 TABLET ORAL EVERY 8 HOURS
Status: DISCONTINUED | OUTPATIENT
Start: 2025-05-26 | End: 2025-05-26 | Stop reason: HOSPADM

## 2025-05-25 RX ORDER — GLUCAGON 1 MG
1 KIT INJECTION
Status: DISCONTINUED | OUTPATIENT
Start: 2025-05-25 | End: 2025-05-26 | Stop reason: HOSPADM

## 2025-05-25 RX ORDER — ACETAMINOPHEN 325 MG/1
650 TABLET ORAL EVERY 4 HOURS PRN
Status: DISCONTINUED | OUTPATIENT
Start: 2025-05-25 | End: 2025-05-25

## 2025-05-25 RX ORDER — CARBIDOPA AND LEVODOPA 25; 100 MG/1; MG/1
1 TABLET ORAL 3 TIMES DAILY
Status: DISCONTINUED | OUTPATIENT
Start: 2025-05-25 | End: 2025-05-26 | Stop reason: HOSPADM

## 2025-05-25 RX ORDER — ONDANSETRON 8 MG/1
8 TABLET, ORALLY DISINTEGRATING ORAL EVERY 8 HOURS PRN
Status: DISCONTINUED | OUTPATIENT
Start: 2025-05-25 | End: 2025-05-26 | Stop reason: HOSPADM

## 2025-05-25 RX ORDER — GABAPENTIN 100 MG/1
100 CAPSULE ORAL 2 TIMES DAILY
Status: DISCONTINUED | OUTPATIENT
Start: 2025-05-25 | End: 2025-05-26 | Stop reason: HOSPADM

## 2025-05-25 RX ORDER — IPRATROPIUM BROMIDE AND ALBUTEROL SULFATE 2.5; .5 MG/3ML; MG/3ML
3 SOLUTION RESPIRATORY (INHALATION) EVERY 4 HOURS PRN
Status: DISCONTINUED | OUTPATIENT
Start: 2025-05-25 | End: 2025-05-26 | Stop reason: HOSPADM

## 2025-05-25 RX ORDER — MORPHINE SULFATE 4 MG/ML
4 INJECTION, SOLUTION INTRAMUSCULAR; INTRAVENOUS
Refills: 0 | Status: COMPLETED | OUTPATIENT
Start: 2025-05-25 | End: 2025-05-25

## 2025-05-25 RX ORDER — SODIUM CHLORIDE 0.9 % (FLUSH) 0.9 %
10 SYRINGE (ML) INJECTION
Status: DISCONTINUED | OUTPATIENT
Start: 2025-05-25 | End: 2025-05-26 | Stop reason: HOSPADM

## 2025-05-25 RX ORDER — METHOCARBAMOL 500 MG/1
500 TABLET, FILM COATED ORAL 3 TIMES DAILY
Status: DISCONTINUED | OUTPATIENT
Start: 2025-05-25 | End: 2025-05-26 | Stop reason: HOSPADM

## 2025-05-25 RX ORDER — OXYCODONE HYDROCHLORIDE 5 MG/1
5 TABLET ORAL EVERY 6 HOURS PRN
Refills: 0 | Status: DISCONTINUED | OUTPATIENT
Start: 2025-05-25 | End: 2025-05-25

## 2025-05-25 RX ADMIN — ACETAMINOPHEN 650 MG: 325 TABLET ORAL at 04:05

## 2025-05-25 RX ADMIN — KETOROLAC TROMETHAMINE 15 MG: 30 INJECTION, SOLUTION INTRAMUSCULAR; INTRAVENOUS at 04:05

## 2025-05-25 RX ADMIN — GABAPENTIN 100 MG: 100 CAPSULE ORAL at 08:05

## 2025-05-25 RX ADMIN — METHOCARBAMOL 500 MG: 500 TABLET ORAL at 08:05

## 2025-05-25 RX ADMIN — MORPHINE SULFATE 4 MG: 4 INJECTION INTRAVENOUS at 05:05

## 2025-05-25 RX ADMIN — CARBIDOPA AND LEVODOPA 1 TABLET: 25; 100 TABLET ORAL at 08:05

## 2025-05-25 NOTE — ED PROVIDER NOTES
"Encounter Date: 5/25/2025       History     Chief Complaint   Patient presents with    Back Pain     Lower back pain and going down to both ankles, denies bowel/bladder incontinence      68 yo female with a pMHx of Parkinson's disease, GERD, and arthritis presents with lower back pain. Patient states her pain started 3 days ago while she was sitting on the couch. She has radiation of pain into bilateral buttocks and hips that continues down her thighs and into both ankles. She is currently endorsing throbbing right thigh pain. Her pain waxes and wanes in intensity and is worse with ambulation, although, it often occurs at rest. She states that her legs will occasionally "lock up" and she will be unable to move them or ambulate due to severe pain. She states when the pain comes, it hurts to even touch her legs. She has not found relief with heat or gabapentin. She states she had an episode of the same symptoms in July 2024 that eventually resolved spontaneously. She struggles with constipation at baseline and denies any new changes in bowel movements. She denies traumatic injury. She denies fever, chills, headache, chest pain, shortness of breath, nausea, vomiting, abdominal pain, urinary retention, saddle anesthesia, or numbness/tingling.     The history is provided by the patient.     Review of patient's allergies indicates:  No Known Allergies  Past Medical History:   Diagnosis Date    Arthritis     Chest pain     GERD (gastroesophageal reflux disease)      Past Surgical History:   Procedure Laterality Date    ABDOMINOPLASTY      APPENDECTOMY  2010    BILATERAL SALPINGO-OOPHORECTOMY (BSO)  12/05/2019    Surgeon: Geri Azar MD -- [facility]    COLONOSCOPY N/A 08/22/2019    Procedure: COLONOSCOPY;  Surgeon: Rose Mary Ervin MD;  Location: [address];  Service: Endoscopy;  Laterality: N/A;  no PM prep    CYSTOSCOPY N/A 12/05/2019    Surgeon: Geri Azar MD -- [facility]    KNEE SURGERY Right " 12/2015    TKR    LAPAROSCOPIC TOTAL HYSTERECTOMY N/A 12/05/2019    Surgeon: Geri Azar MD -- [facility]    TUBAL LIGATION      age 30's     Family History   Problem Relation Name Age of Onset    Diabetes Mother      Hypertension Mother      Hypothyroidism Mother      Asthma Father age 52     Hypertension Sister Leisa     Cancer Sister Leisa         breast    Breast cancer Sister Leisa     Hypertension Brother      Hypertension Sister      Hypertension Sister      Hypertension Brother      Hypertension Son      Colon cancer Neg Hx      Ovarian cancer Neg Hx       Social History[1]  Review of Systems    Physical Exam     Initial Vitals [05/25/25 1523]   BP Pulse Resp Temp SpO2   (!) 144/71 100 18 98.5 °F (36.9 °C) 98 %      MAP       --         Physical Exam    Nursing note and vitals reviewed.  Constitutional: She appears well-nourished. She is not diaphoretic. She appears distressed.   Uncomfortable appearing female sitting in bed.   HENT:   Head: Normocephalic and atraumatic.   Eyes: Conjunctivae are normal.   Neck:   Normal range of motion.  Cardiovascular:  Regular rhythm.           No murmur heard.  Tachycardic   Pulmonary/Chest: Breath sounds normal. No respiratory distress. She has no wheezes.   Musculoskeletal:      Cervical back: Normal range of motion.      Comments: No mildline spine tenderness, no gluteal tenderness. No tenderness to lower extremities. No lower extremity edema. No popliteal tenderness.     Neurological: She is alert and oriented to person, place, and time. GCS score is 15. GCS eye subscore is 4. GCS verbal subscore is 5. GCS motor subscore is 6.   No sensory deficits. 5/5 motor strength to lower extremities.   Skin: Skin is warm and dry. No rash noted.   No lower extremity skin changes.   Psychiatric: She has a normal mood and affect.         ED Course   Procedures  Labs Reviewed   COMPREHENSIVE METABOLIC PANEL - Abnormal       Result Value    Sodium 143      Potassium 3.8       Chloride 106      CO2 25      Glucose 106      BUN 15      Creatinine 1.0      Calcium 9.6      Protein Total 7.5      Albumin 3.5      Bilirubin Total 0.6      ALP 63      AST 26      ALT 5 (*)     Anion Gap 12      eGFR >60     CBC WITH DIFFERENTIAL - Abnormal    WBC 13.24 (*)     RBC 4.14      HGB 12.7      HCT 40.4      MCV 98      MCH 30.7      MCHC 31.4 (*)     RDW 13.9      Platelet Count 259      MPV 12.6      Nucleated RBC 0      Neut % 64.5      Lymph % 20.5      Mono % 13.3      Eos % 0.1      Basophil % 0.4      Imm Grans % 1.2 (*)     Neut # 8.55 (*)     Lymph # 2.71      Mono # 1.76 (*)     Eos # 0.01      Baso # 0.05      Imm Grans # 0.16 (*)    CBC W/ AUTO DIFFERENTIAL    Narrative:     The following orders were created for panel order CBC auto differential.  Procedure                               Abnormality         Status                     ---------                               -----------         ------                     CBC with Differential[[id number]]       Abnormal            Final result                 Please view results for these tests on the individual orders.   URINALYSIS, REFLEX TO URINE CULTURE          Imaging Results    None          Medications   sodium chloride 0.9% flush 10 mL (has no administration in time range)   albuterol-ipratropium 2.5 mg-0.5 mg/3 mL nebulizer solution 3 mL (has no administration in time range)   melatonin tablet 6 mg (has no administration in time range)   ondansetron disintegrating tablet 8 mg (has no administration in time range)   bisacodyL suppository 10 mg (has no administration in time range)   acetaminophen tablet 650 mg (has no administration in time range)   glucose chewable tablet 16 g (has no administration in time range)   glucose chewable tablet 24 g (has no administration in time range)   dextrose 50% injection 12.5 g (has no administration in time range)   dextrose 50% injection 25 g (has no administration in time range)   glucagon  (human recombinant) injection 1 mg (has no administration in time range)   carbidopa-levodopa  mg per tablet 1 tablet (has no administration in time range)   gabapentin capsule 100 mg (has no administration in time range)   oxyCODONE immediate release tablet 5 mg (has no administration in time range)   oxyCODONE immediate release tablet 10 mg (has no administration in time range)   methocarbamoL tablet 500 mg (has no administration in time range)   ketorolac injection 15 mg (15 mg Intravenous Given 5/25/25 1645)   acetaminophen tablet 650 mg (650 mg Oral Given 5/25/25 1644)   morphine injection 4 mg (4 mg Intravenous Given 5/25/25 1706)     Medical Decision Making  Patient is a 69 year-old F with a pMHx of Parkinson's disease who presents to the emergency department with complaints of lower back and bilateral leg pain. Patient is non-toxic appearing, afebrile, and hemodynamically stable.     Differential diagnosis includes but is not limited to herniated disc, lumbar radiculopathy, spinal stenosis, spondylolysis, cauda equina, peripheral neuropathy, degenerative disc disease, arthritis    Labs and imaging reviewed, see ED course. Patient was given acetaminophen and Toradol without relief. Given morphine. Upon reassessment, she is comfortable at rest, but her pain returns when she tries to stand.    Disposition   Patient will be placed in EDOU for observation, pain control, and f/u MRI results. Plan to discharge with pain regimen if MRI reassuring and pain controlled. Discussed patient presentation with EDOU provider who is agreeable to plan. Patient reassessed, discussed dispo, given opportunity to ask additional questions prior to disposition.    Amount and/or Complexity of Data Reviewed  Labs: ordered. Decision-making details documented in ED Course.  Radiology: ordered.    Risk  OTC drugs.  Prescription drug management.               ED Course as of 05/25/25 1842   Sun May 25, 2025   8815 [facility] auto  differential(!)  Mild leukocytosis [NK]   1840 No electrolyte derangement or impaired renal function. [NK]      ED Course User Index  [NK] Sina Solis PA-C                           Clinical Impression:  Final diagnoses:  [M54.9] Back pain (Primary)          ED Disposition Condition    Observation                       [1]   Social History  Tobacco Use    Smoking status: Never     Passive exposure: Never    Smokeless tobacco: Never    Tobacco comments:     RN at [employer]   Substance Use Topics    Alcohol use: No    Drug use: No        Sina Solis PA-C  05/25/25 3585

## 2025-05-25 NOTE — ED TRIAGE NOTES
Patient identifiers verified and correct for Brittany Modi  C/C: back/leg pain  APPEARANCE: awake and alert in NAD.   SKIN: warm, dry and intact. No breakdown or bruising.  MUSCULOSKELETAL: Patient moving all extremities spontaneously, no obvious swelling or deformities noted. Ambulates independently. Reports severe lower back pain and pain that radiates into the legs bilaterally.  RESPIRATORY: Denies shortness of breath.Respirations unlabored.   CARDIAC: Denies CP, 2+ distal pulses; no peripheral edema  ABDOMEN: S/ND/NT, Denies nausea  : voids spontaneously, denies difficulty  Neurologic: AAO x 4; follows commands equal strength in all extremities; denies numbness/tingling. Denies dizziness       Since Friday, has been having low back pain, that radiates into the legs. Any movement is painful. Gabapentin has not been helping.

## 2025-05-25 NOTE — H&P
"ED Observation Unit  History and Physical      I assumed care of this patient from the Main ED at onset of observation time, 1758 on 05/25/2025.       History of Present Illness:    "68 yo female with a pMHx of Parkinson's disease, GERD, and arthritis presents with lower back pain. Patient states her pain started 3 days ago while she was sitting on the couch. She has radiation of pain into bilateral buttocks and hips that continues down both thighs and into both ankles. She is currently endorsing throbbing right thigh pain. Her pain waxes and wanes in intensity and is worse with ambulation, although, it often occurs at rest. She states that her legs will occasionally "lock up" and she will be unable to move them or ambulate due to severe pain. She states when the pain comes, it hurts to even touch her legs. She has not found relief with heat or gabapentin. She states she had an episode of the same symptoms in July 2024 that eventually resolved spontaneously. She struggles with constipation at baseline and denies any new changes in bowel movements. She denies traumatic injury. She denies fever, chills, headache, chest pain, shortness of breath, nausea, vomiting, abdominal pain, urinary retention, saddle anesthesia, or numbness/tingling."    I reviewed the ED Provider Note dated 5/25/25 prior to my evaluation of this patient.  I reviewed all labs and imaging performed in the Main ED, prior to patient being placed in Observation. Patient was placed in the ED Observation Unit for back pain.    PMHx   Past Medical History:   Diagnosis Date    Arthritis     Chest pain     GERD (gastroesophageal reflux disease)       Past Surgical History:   Procedure Laterality Date    ABDOMINOPLASTY      APPENDECTOMY  2010    BILATERAL SALPINGO-OOPHORECTOMY (BSO)  12/05/2019    Surgeon: Geri Azar MD -- [facility]/BSO    COLONOSCOPY N/A 08/22/2019    Procedure: COLONOSCOPY;  Surgeon: Rose Mary Ervin MD;  Location: [facility] (4TH " FLR);  Service: Endoscopy;  Laterality: N/A;  no PM prep    CYSTOSCOPY N/A 12/05/2019    Surgeon: Geri Azar MD -- [facility]    KNEE SURGERY Right 12/2015    TKR    LAPAROSCOPIC TOTAL HYSTERECTOMY N/A 12/05/2019    Surgeon: Geri Azar MD -- [facility]    TUBAL LIGATION      age 30's        Family Hx   Family History   Problem Relation Name Age of Onset    Diabetes Mother      Hypertension Mother      Hypothyroidism Mother      Asthma Father age 52     Hypertension Sister Leisa     Cancer Sister Leisa         breast    Breast cancer Sister Leisa     Hypertension Brother      Hypertension Sister      Hypertension Sister      Hypertension Brother      Hypertension Son      Colon cancer Neg Hx      Ovarian cancer Neg Hx          Social Hx   Social History     Socioeconomic History    Marital status:     Number of children: 2   Occupational History    Occupation: RN   Tobacco Use    Smoking status: Never     Passive exposure: Never    Smokeless tobacco: Never    Tobacco comments:     RN at [employer]   Substance and Sexual Activity    Alcohol use: No    Drug use: No    Sexual activity: Yes     Partners: Male     Birth control/protection: None   Social History Narrative    ** Merged History Encounter **          Social Drivers of Health     Financial Resource Strain: Low Risk  (4/26/2024)    Overall Financial Resource Strain (CARDIA)     Difficulty of Paying Living Expenses: Not very hard   Food Insecurity: No Food Insecurity (4/26/2024)    Hunger Vital Sign     Worried About Running Out of Food in the Last Year: Never true     Ran Out of Food in the Last Year: Never true   Transportation Needs: No Transportation Needs (4/26/2024)    PRAPARE - Transportation     Lack of Transportation (Medical): No     Lack of Transportation (Non-Medical): No   Physical Activity: Insufficiently Active (4/26/2024)    Exercise Vital Sign     Days of Exercise per Week: 3 days     Minutes of Exercise per Session: 40 min  "  Stress: Stress Concern Present (4/26/2024)    Citizen of [place] of Occup[employer] - Occupational Stress Questionnaire     Feeling of Stress : To some extent   Housing Stability: Unknown (4/26/2024)    Housing Stability Vital Sign     Unable to Pay for Housing in the Last Year: No        Vital Signs   Vitals:    05/25/25 1523 05/25/25 1706 05/25/25 1725   BP: (!) 144/71  (!) 145/80   Pulse: 100  82   Resp: 18 16 18   Temp: 98.5 °F (36.9 °C)  98.2 °F (36.8 °C)   TempSrc: Oral  Oral   SpO2: 98%  100%   Weight: 77.1 kg (170 lb)     Height: 5' 5" (1.651 m)          Review of Systems  Review of Systems   Musculoskeletal:  Positive for back pain.   Neurological:  Positive for weakness. Negative for tingling, sensory change and focal weakness.       Physical Exam  Physical Exam  Constitutional:       General: She is not in acute distress.     Appearance: Normal appearance. She is not ill-appearing.      Comments: Sitting in wheelchair- endorses pain when lying down   HENT:      Head: Normocephalic and atraumatic.   Cardiovascular:      Rate and Rhythm: Normal rate and regular rhythm.   Pulmonary:      Effort: Pulmonary effort is normal.      Breath sounds: Normal breath sounds.   Abdominal:      General: Abdomen is flat.      Palpations: Abdomen is soft.   Musculoskeletal:         General: No tenderness (No TTP along back).      Comments: Pain to back and BLE when standing, denies pain with passive ROM   Neurological:      General: No focal deficit present.      Mental Status: She is alert.      Sensory: No sensory deficit.      Motor: Weakness (mild, BLE) present.         Medications:   Scheduled Meds:  Continuous Infusions:  PRN Meds:.  Current Facility-Administered Medications:     acetaminophen, 650 mg, Oral, Q4H PRN    albuterol-ipratropium, 3 mL, Nebulization, Q4H PRN    bisacodyL, 10 mg, Rectal, Daily PRN    dextrose 50%, 12.5 g, Intravenous, PRN    dextrose 50%, 25 g, Intravenous, PRN    glucagon (human " recombinant), 1 mg, Intramuscular, PRN    glucose, 16 g, Oral, PRN    glucose, 24 g, Oral, PRN    melatonin, 6 mg, Oral, Nightly PRN    ondansetron, 8 mg, Oral, Q8H PRN    oxyCODONE, 10 mg, Oral, Q6H PRN    oxyCODONE, 5 mg, Oral, Q6H PRN    sodium chloride 0.9%, 10 mL, Intravenous, PRN      Assessment/Plan:  Back pain  - MRI Lumbar spine pending  - MM pain regimen- robaxin, gabapentin (lowered home dose as possibly had hallucinations) and oxy PRN  - PT consulted  - NSGY inpatient versus outpatient pending MRI read    Case was discussed with the ED provider, Sina Solis PA-C.        Lori Pack PA-C  [facility]

## 2025-05-25 NOTE — ED NOTES
Mult staff and PA at bedside to assist patient to wheelchair for transfer, patient and family requests she stay in INTAKE room, moved to wheelchair with encouragement, slowly.

## 2025-05-25 NOTE — ED NOTES
PA at bedside in INTAKE, patient with difficulty moving from exam table to wheelchair,  PA made aware of difficulty standing and moving

## 2025-05-26 ENCOUNTER — TELEPHONE (OUTPATIENT)
Facility: CLINIC | Age: 70
End: 2025-05-26
Payer: MEDICARE

## 2025-05-26 VITALS
DIASTOLIC BLOOD PRESSURE: 77 MMHG | TEMPERATURE: 99 F | OXYGEN SATURATION: 96 % | RESPIRATION RATE: 18 BRPM | HEART RATE: 73 BPM | HEIGHT: 65 IN | WEIGHT: 170 LBS | BODY MASS INDEX: 28.32 KG/M2 | SYSTOLIC BLOOD PRESSURE: 147 MMHG

## 2025-05-26 PROBLEM — M54.9 BACK PAIN: Status: ACTIVE | Noted: 2025-05-26

## 2025-05-26 LAB
ABSOLUTE EOSINOPHIL (OHS): 0.12 K/UL
ABSOLUTE MONOCYTE (OHS): 1.03 K/UL (ref 0.3–1)
ABSOLUTE NEUTROPHIL COUNT (OHS): 4.17 K/UL (ref 1.8–7.7)
ANION GAP (OHS): 12 MMOL/L (ref 8–16)
BACTERIA #/AREA URNS AUTO: ABNORMAL /HPF
BASOPHILS # BLD AUTO: 0.05 K/UL
BASOPHILS NFR BLD AUTO: 0.6 %
BILIRUB UR QL STRIP.AUTO: NEGATIVE
BUN SERPL-MCNC: 20 MG/DL (ref 8–23)
CALCIUM SERPL-MCNC: 9.4 MG/DL (ref 8.7–10.5)
CHLORIDE SERPL-SCNC: 108 MMOL/L (ref 95–110)
CLARITY UR: CLEAR
CO2 SERPL-SCNC: 21 MMOL/L (ref 23–29)
COLOR UR AUTO: YELLOW
CREAT SERPL-MCNC: 1.1 MG/DL (ref 0.5–1.4)
ERYTHROCYTE [DISTWIDTH] IN BLOOD BY AUTOMATED COUNT: 13.8 % (ref 11.5–14.5)
GFR SERPLBLD CREATININE-BSD FMLA CKD-EPI: 55 ML/MIN/1.73/M2
GLUCOSE SERPL-MCNC: 85 MG/DL (ref 70–110)
GLUCOSE UR QL STRIP: NEGATIVE
HCT VFR BLD AUTO: 37.2 % (ref 37–48.5)
HGB BLD-MCNC: 11.4 GM/DL (ref 12–16)
HGB UR QL STRIP: NEGATIVE
HOLD SPECIMEN: NORMAL
HYALINE CASTS UR QL AUTO: 0 /LPF (ref 0–1)
IMM GRANULOCYTES # BLD AUTO: 0.03 K/UL (ref 0–0.04)
IMM GRANULOCYTES NFR BLD AUTO: 0.4 % (ref 0–0.5)
KETONES UR QL STRIP: ABNORMAL
LEUKOCYTE ESTERASE UR QL STRIP: NEGATIVE
LYMPHOCYTES # BLD AUTO: 2.68 K/UL (ref 1–4.8)
MCH RBC QN AUTO: 30.7 PG (ref 27–31)
MCHC RBC AUTO-ENTMCNC: 30.6 G/DL (ref 32–36)
MCV RBC AUTO: 100 FL (ref 82–98)
MICROSCOPIC COMMENT: ABNORMAL
NITRITE UR QL STRIP: NEGATIVE
NUCLEATED RBC (/100WBC) (OHS): 0 /100 WBC
PH UR STRIP: 6 [PH]
PLATELET # BLD AUTO: 252 K/UL (ref 150–450)
PMV BLD AUTO: 12 FL (ref 9.2–12.9)
POTASSIUM SERPL-SCNC: 3.7 MMOL/L (ref 3.5–5.1)
PROT UR QL STRIP: ABNORMAL
RBC # BLD AUTO: 3.71 M/UL (ref 4–5.4)
RBC #/AREA URNS AUTO: 9 /HPF (ref 0–4)
RELATIVE EOSINOPHIL (OHS): 1.5 %
RELATIVE LYMPHOCYTE (OHS): 33.2 % (ref 18–48)
RELATIVE MONOCYTE (OHS): 12.7 % (ref 4–15)
RELATIVE NEUTROPHIL (OHS): 51.6 % (ref 38–73)
SODIUM SERPL-SCNC: 141 MMOL/L (ref 136–145)
SP GR UR STRIP: >=1.03
SQUAMOUS #/AREA URNS AUTO: 1 /HPF
UROBILINOGEN UR STRIP-ACNC: ABNORMAL EU/DL
WBC # BLD AUTO: 8.08 K/UL (ref 3.9–12.7)
WBC #/AREA URNS AUTO: 1 /HPF (ref 0–5)

## 2025-05-26 PROCEDURE — 80048 BASIC METABOLIC PNL TOTAL CA: CPT

## 2025-05-26 PROCEDURE — 85025 COMPLETE CBC W/AUTO DIFF WBC: CPT

## 2025-05-26 PROCEDURE — 25000003 PHARM REV CODE 250

## 2025-05-26 PROCEDURE — G0378 HOSPITAL OBSERVATION PER HR: HCPCS

## 2025-05-26 PROCEDURE — 81003 URINALYSIS AUTO W/O SCOPE: CPT

## 2025-05-26 RX ORDER — GABAPENTIN 100 MG/1
100 CAPSULE ORAL 3 TIMES DAILY
Qty: 90 CAPSULE | Refills: 0 | Status: SHIPPED | OUTPATIENT
Start: 2025-05-26 | End: 2025-06-25

## 2025-05-26 RX ORDER — NAPROXEN 500 MG/1
500 TABLET ORAL 2 TIMES DAILY WITH MEALS
Qty: 20 TABLET | Refills: 0 | Status: SHIPPED | OUTPATIENT
Start: 2025-05-26 | End: 2025-06-05

## 2025-05-26 RX ORDER — METHYLPREDNISOLONE 4 MG/1
TABLET ORAL
Qty: 21 TABLET | Refills: 0 | Status: SHIPPED | OUTPATIENT
Start: 2025-05-26 | End: 2025-06-16

## 2025-05-26 RX ORDER — METHOCARBAMOL 500 MG/1
1000 TABLET, FILM COATED ORAL 3 TIMES DAILY
Qty: 30 TABLET | Refills: 0 | Status: SHIPPED | OUTPATIENT
Start: 2025-05-26 | End: 2025-05-31

## 2025-05-26 RX ADMIN — GABAPENTIN 100 MG: 100 CAPSULE ORAL at 08:05

## 2025-05-26 RX ADMIN — METHOCARBAMOL 500 MG: 500 TABLET ORAL at 08:05

## 2025-05-26 RX ADMIN — CARBIDOPA AND LEVODOPA 1 TABLET: 25; 100 TABLET ORAL at 08:05

## 2025-05-26 NOTE — DISCHARGE SUMMARY
"ED Observation Unit  Discharge Summary        History of Present Illness:    68 yo female with a pMHx of Parkinson's disease, GERD, and arthritis presents with lower back pain. Patient states her pain started 3 days ago while she was sitting on the couch. She has radiation of pain into bilateral buttocks and hips that continues down both thighs and into both ankles. She is currently endorsing throbbing right thigh pain. Her pain waxes and wanes in intensity and is worse with ambulation, although, it often occurs at rest. She states that her legs will occasionally "lock up" and she will be unable to move them or ambulate due to severe pain. She states when the pain comes, it hurts to even touch her legs. She has not found relief with heat or gabapentin. She states she had an episode of the same symptoms in July 2024 that eventually resolved spontaneously. She struggles with constipation at baseline and denies any new changes in bowel movements. She denies traumatic injury. She denies fever, chills, headache, chest pain, shortness of breath, nausea, vomiting, abdominal pain, urinary retention, saddle anesthesia, or numbness/tingling.     I reviewed the ED Provider Note dated 5/25/25 prior to my evaluation of this patient.  I reviewed all labs and imaging performed in the Main ED, prior to patient being placed in Observation. Patient was placed in the ED Observation Unit for back pain      Observation Course:    Three days of low back pain radiating down both legs.  Admitted for MRI and potential neurosurgery consultation.  MRI L-spine Multilevel degenerative changes of the spine notable for moderate spinal canal stenosis at L4-L5 and moderate bilateral neural foraminal narrowing at L4-L5 and L5-S1.  No signs of cauda equina.  On reassessment this morning pain is significantly improved and she is ambulatory.  Stable for outpatient follow-up with back and spine clinic.  Will have her continue gabapentin at a lower dose " due to concern for hallucinations.  Will continue with multimodal pain management, Medrol Dosepak, naproxen, Lidoderm and Robaxin.      Final Diagnosis:  Sciatica    Discharge Condition: Good    Disposition: Home or Self Care     Time spent on the discharge of the patient including review of hospital course with the patient. reviewing discharge medications and arranging follow-up care 35 minutes.  Patient was seen and examined on the date of discharge and determined to be suitable for discharge.    Follow Up:  Future Appointments   Date Time Provider Department Center   6/2/2025  3:15 PM Sidra Kim, PT [facility]   6/3/2025  1:00 PM Danyell Rich, PT [facility]   6/4/2025  1:00 PM Sidra Kim, PT [facility]   6/5/2025  1:00 PM Danyell Rich, PT [facility]   6/6/2025 11:00 AM Genesis Obrien MD [facility]   6/16/2025  1:00 PM Sidra Kim, PT [facility]   6/17/2025  1:45 PM Steff Draper, PTA [facility]   6/18/2025  1:00 PM Aniya Bowden, PT [facility]   6/20/2025  8:45 AM Aniya Bowden, PT [facility]   6/23/2025  1:00 PM Sidra Kim, PT [facility]   6/24/2025  1:45 PM Steff Draper, PTA [facility]   6/25/2025  1:45 PM Sidra Kim, PT [facility]   6/27/2025 11:15 AM Steff Draper, PTA [facility]   6/30/2025 11:15 AM Aniya Bowden, PT [facility]   7/1/2025 11:15 AM Steff Draper, PTA [facility]   7/2/2025 11:15 AM Steff Draper, PTA [facility]   7/3/2025  1:45 PM Brenda Adler, PT [facility]       Back and spine clinic, neurosurgery

## 2025-05-26 NOTE — PROGRESS NOTES
"ED Observation Unit  Progress Note      HPI   70 yo female with a pMHx of Parkinson's disease, GERD, and arthritis presents with lower back pain. Patient states her pain started 3 days ago while she was sitting on the couch. She has radiation of pain into bilateral buttocks and hips that continues down both thighs and into both ankles. She is currently endorsing throbbing right thigh pain. Her pain waxes and wanes in intensity and is worse with ambulation, although, it often occurs at rest. She states that her legs will occasionally "lock up" and she will be unable to move them or ambulate due to severe pain. She states when the pain comes, it hurts to even touch her legs. She has not found relief with heat or gabapentin. She states she had an episode of the same symptoms in July 2024 that eventually resolved spontaneously. She struggles with constipation at baseline and denies any new changes in bowel movements. She denies traumatic injury. She denies fever, chills, headache, chest pain, shortness of breath, nausea, vomiting, abdominal pain, urinary retention, saddle anesthesia, or numbness/tingling."     I reviewed the ED Provider Note dated 5/25/25 prior to my evaluation of this patient.  I reviewed all labs and imaging performed in the Main ED, prior to patient being placed in Observation. Patient was placed in the ED Observation Unit for back pain     Interval History   Patient was admitted to the ED observation unit for lower back pain radiating down both legs.  Received MRI of the L-spine which show Multilevel degenerative changes of the spine notable for moderate spinal canal stenosis at L4-L5 and moderate bilateral neural foraminal narrowing at L4-L5 and L5-S1.  Received multimodal pain management overnight this morning she is feeling improved in his ambulatory.  No signs of cauda equina on imaging.  Stable for discharge home today with multimodal pain management and outpatient referral for back and spine " clinic and neurosurgery was placed.     Bilateral facet and spinous process edema at L4-L5 which could reflect Baastrup's disease.    PMHx   Past Medical History:   Diagnosis Date    Arthritis     Chest pain     GERD (gastroesophageal reflux disease)       Past Surgical History:   Procedure Laterality Date    ABDOMINOPLASTY      APPENDECTOMY  2010    BILATERAL SALPINGO-OOPHORECTOMY (BSO)  12/05/2019    Surgeon: Geri Azar MD -- [facility]    COLONOSCOPY N/A 08/22/2019    Procedure: COLONOSCOPY;  Surgeon: Rose Mary Ervin MD;  Location: [address]);  Service: Endoscopy;  Laterality: N/A;  no PM prep    CYSTOSCOPY N/A 12/05/2019    Surgeon: Geri Azar MD -- [facility]    KNEE SURGERY Right 12/2015    TKR    LAPAROSCOPIC TOTAL HYSTERECTOMY N/A 12/05/2019    Surgeon: Geri Azar MD -- [facility]    TUBAL LIGATION      age 30's        Family Hx   Family History   Problem Relation Name Age of Onset    Diabetes Mother      Hypertension Mother      Hypothyroidism Mother      Asthma Father age 52     Hypertension Sister Leisa     Cancer Sister Leisa         breast    Breast cancer Sister Leisa     Hypertension Brother      Hypertension Sister      Hypertension Sister      Hypertension Brother      Hypertension Son      Colon cancer Neg Hx      Ovarian cancer Neg Hx          Social Hx   Social History     Socioeconomic History    Marital status:     Number of children: 2   Occupational History    Occupation: RN   Tobacco Use    Smoking status: Never     Passive exposure: Never    Smokeless tobacco: Never    Tobacco comments:     RN at [employer]   Substance and Sexual Activity    Alcohol use: No    Drug use: No    Sexual activity: Yes     Partners: Male     Birth control/protection: None   Social History Narrative    ** Merged History Encounter **          Social Drivers of Health     Financial Resource Strain: Low Risk  (4/26/2024)    Overall Financial Resource Strain (CARDIA)     Difficulty of  Paying Living Expenses: Not very hard   Food Insecurity: No Food Insecurity (4/26/2024)    Hunger Vital Sign     Worried About Running Out of Food in the Last Year: Never true     Ran Out of Food in the Last Year: Never true   Transportation Needs: No Transportation Needs (4/26/2024)    PRAPARE - Transportation     Lack of Transportation (Medical): No     Lack of Transportation (Non-Medical): No   Physical Activity: Insufficiently Active (4/26/2024)    Exercise Vital Sign     Days of Exercise per Week: 3 days     Minutes of Exercise per Session: 40 min   Stress: Stress Concern Present (4/26/2024)    [employer] [place] [employer] - Occupational Stress Questionnaire     Feeling of Stress : To some extent   Housing Stability: Unknown (4/26/2024)    Housing Stability Vital Sign     Unable to Pay for Housing in the Last Year: No        Vital Signs   Vitals:    05/25/25 1725 05/25/25 2211 05/26/25 0310 05/26/25 0750   BP: (!) 145/80 (!) 122/57 132/65 (!) 147/77   BP Location:  Left arm  Left arm   Patient Position:  Lying  Lying   Pulse: 82 70 69 73   Resp: 18 18  18   Temp: 98.2 °F (36.8 °C) 98.7 °F (37.1 °C) 98 °F (36.7 °C) 98.7 °F (37.1 °C)   TempSrc: Oral Oral Oral Oral   SpO2: 100% 97% 100% 96%   Weight:       Height:            Review of Systems  ROS    Brief Physical Exam/Reassessment   Physical Exam  Vitals and nursing note reviewed.   Constitutional:       Appearance: Normal appearance.   HENT:      Head: Normocephalic and atraumatic.      Nose: Nose normal.   Eyes:      Conjunctiva/sclera: Conjunctivae normal.   Cardiovascular:      Rate and Rhythm: Normal rate.   Pulmonary:      Effort: Pulmonary effort is normal.      Breath sounds: Normal breath sounds.   Abdominal:      General: Abdomen is flat.      Tenderness: There is no abdominal tenderness.   Musculoskeletal:         General: Normal range of motion.   Skin:     General: Skin is warm and dry.      Capillary Refill: Capillary refill takes less  than 2 seconds.   Neurological:      General: No focal deficit present.      Mental Status: She is alert and oriented to person, place, and time.         Labs/Imaging   Labs Reviewed   COMPREHENSIVE METABOLIC PANEL - Abnormal       Result Value    Sodium 143      Potassium 3.8      Chloride 106      CO2 25      Glucose 106      BUN 15      Creatinine 1.0      Calcium 9.6      Protein Total 7.5      Albumin 3.5      Bilirubin Total 0.6      ALP 63      AST 26      ALT 5 (*)     Anion Gap 12      eGFR >60     CBC WITH DIFFERENTIAL - Abnormal    WBC 13.24 (*)     RBC 4.14      HGB 12.7      HCT 40.4      MCV 98      MCH 30.7      MCHC 31.4 (*)     RDW 13.9      Platelet Count 259      MPV 12.6      Nucleated RBC 0      Neut % 64.5      Lymph % 20.5      Mono % 13.3      Eos % 0.1      Basophil % 0.4      Imm Grans % 1.2 (*)     Neut # 8.55 (*)     Lymph # 2.71      Mono # 1.76 (*)     Eos # 0.01      Baso # 0.05      Imm Grans # 0.16 (*)    URINALYSIS, REFLEX TO URINE CULTURE - Abnormal    Color, UA Yellow      Appearance, UA Clear      pH, UA 6.0      Spec Grav UA >=1.030 (*)     Protein, UA 1+ (*)     Glucose, UA Negative      Ketones, UA 1+ (*)     Bilirubin, UA Negative      Blood, UA Negative      Nitrites, UA Negative      Urobilinogen, UA 2.0-3.0 (*)     Leukocyte Esterase, UA Negative     BASIC METABOLIC PANEL - Abnormal    Sodium 141      Potassium 3.7      Chloride 108      CO2 21 (*)     Glucose 85      BUN 20      Creatinine 1.1      Calcium 9.4      Anion Gap 12      eGFR 55 (*)    CBC WITH DIFFERENTIAL - Abnormal    WBC 8.08      RBC 3.71 (*)     HGB 11.4 (*)     HCT 37.2       (*)     MCH 30.7      MCHC 30.6 (*)     RDW 13.8      Platelet Count 252      MPV 12.0      Nucleated RBC 0      Neut % 51.6      Lymph % 33.2      Mono % 12.7      Eos % 1.5      Basophil % 0.6      Imm Grans % 0.4      Neut # 4.17      Lymph # 2.68      Mono # 1.03 (*)     Eos # 0.12      Baso # 0.05      Imm Grans # 0.03      URINALYSIS MICROSCOPIC - Abnormal    RBC, UA 9 (*)     WBC, UA 1      Bacteria, UA Rare      Squamous Epithelial Cells, UA 1      Hyaline Casts, UA 0      Microscopic Comment       CBC W/ AUTO DIFFERENTIAL    Narrative:     The following orders were created for panel order CBC auto differential.  Procedure                               Abnormality         Status                     ---------                               -----------         ------                     CBC with Differential[[id number]]       Abnormal            Final result                 Please view results for these tests on the individual orders.   CBC W/ AUTO DIFFERENTIAL    Narrative:     The following orders were created for panel order CBC with Automated Differential.  Procedure                               Abnormality         Status                     ---------                               -----------         ------                     CBC with Differential[[id number]]       Abnormal            Final result                 Please view results for these tests on the individual orders.   [facility] URINE HOLD      Imaging Results              MRI Lumbar Spine Without Contrast (Final result)  Result time 05/25/25 23:05:14      Final result by Jose Hdez MD (05/25/25 23:05:14)                   Impression:      Multilevel degenerative changes of the spine notable for moderate spinal canal stenosis at L4-L5 and moderate bilateral neural foraminal narrowing at L4-L5 and L5-S1.    Bilateral facet and spinous process edema at L4-L5 which could reflect Baastrup's disease.    Electronically signed by resident: Tej Abel  Date:    05/25/2025  Time:    22:40    Electronically signed by: Jose Hdez MD  Date:    05/25/2025  Time:    23:05               Narrative:    EXAMINATION:  MRI LUMBAR SPINE WITHOUT CONTRAST    CLINICAL HISTORY:  Lumbar radiculopathy, no red flags, no prior management    TECHNIQUE:  Multiplanar, multisequence MR  images were acquired from the thoracolumbar junction to the sacrum without contrast.    COMPARISON:  None.    FINDINGS:  Alignment: Grade 1 anterolisthesis of L4 on L5.    Vertebrae: No fracture.  Bilateral pedicle, facet and spinous process edema at L4 and L5.    Discs: Disc heights are maintained.    Cord: Conus terminates at L1 and appears unremarkable.  Cauda equina appears unremarkable.    Degenerative findings:    T12-L1: No spinal canal stenosis or neuroforaminal narrowing.    L1-L2: No spinal canal stenosis or neuroforaminal narrowing.    L2-L3: Mild bilateral facet arthropathy and ligamentum flavum hypertrophy contribute to mild spinal canal stenosis.  No significant neural foraminal narrowing.    L3-L4: Circumferential disc bulge, moderate bilateral facet arthropathy and ligamentum flavum hypertrophy contribute to moderate spinal canal stenosis, mild bilateral neural foraminal narrowing.    L4-L5: Circumferential disc bulge, moderate bilateral facet arthropathy and ligamentum flavum hypertrophy contribute to moderate spinal canal stenosis and moderate bilateral neural foraminal narrowing.  Bilateral facet edema.    L5-S1: Circumferential disc bulge, moderate bilateral facet arthropathy and ligamentum flavum hypertrophy contribute to moderate bilateral neural foraminal narrowing.  Bilateral facet edema.  No significant spinal canal stenosis.    Paraspinal muscles & soft tissues: Soft tissue edema about the posterior elements of L4 and L5.                        Preliminary result by Tej Abel MD (05/25/25 22:52:31)                   Impression:      Multilevel degenerative changes of the spine notable for moderate spinal canal stenosis at L4-L5 and moderate bilateral neural foraminal narrowing at L4-L5, L5-S1.    Bilateral facet and spinous process edema at L4-L5 which could reflect Baastrup's disease.    Electronically signed by resident: Tej Abel  Date:    05/25/2025  Time:    22:40                  Narrative:    EXAMINATION:  MRI LUMBAR SPINE WITHOUT CONTRAST    CLINICAL HISTORY:  Lumbar radiculopathy, no red flags, no prior management;    TECHNIQUE:  Multiplanar, multisequence MR images were acquired from the thoracolumbar junction to the sacrum without contrast.    COMPARISON:  None.    FINDINGS:  Alignment: Grade 1 anterolisthesis of L4 on L5.    Vertebrae: No fracture.  Bilateral pedicle, facet and spinous process edema at L4 and L5.    Discs: Disc heights are maintained.    Cord: Conus terminates at L1 and appears unremarkable.  Cauda equina appears unremarkable.    Degenerative findings:    T12-L1: No spinal canal stenosis or neuroforaminal narrowing.    L1-L2: No spinal canal stenosis or neuroforaminal narrowing.    L2-L3: Mild bilateral facet arthropathy and ligamentum flavum hypertrophy contribute to mild spinal canal stenosis.  No significant neural foraminal narrowing.    L3-L4: Circumferential disc bulge, moderate bilateral facet arthropathy and ligamentum flavum hypertrophy contribute to moderate spinal canal stenosis, mild bilateral neural foraminal narrowing.    L4-L5: Circumferential disc bulge, moderate bilateral facet arthropathy and ligamentum flavum hypertrophy contribute to moderate spinal canal stenosis and moderate bilateral neural foraminal narrowing.  Bilateral facet edema.    L5-S1: Circumferential disc bulge, moderate bilateral facet arthropathy and ligamentum flavum hypertrophy contribute to moderate bilateral neural foraminal narrowing.  Bilateral facet edema.  No significant spinal canal stenosis.    Paraspinal muscles & soft tissues: Soft tissue edema about the posterior elements of L4 and L5.                                       I reviewed all labs, imaging, EKGs.     Plan   Back pain  - MRI Lumbar spine with multilevel degenerative changes and circumferential disc bulging.  No cauda equina.  - MM pain regimen- robaxin, gabapentin (lowered home dose as possibly  had hallucinations) and oxy PRN  - pain is improved on reassessment this morning.  Plan for DC today with follow-up with the back and spine clinic.     Case was discussed with the ED provider, Lori Pack PA-C.

## 2025-05-26 NOTE — TELEPHONE ENCOUNTER
----- Message from Ellen sent at 5/23/2025  8:35 AM CDT -----  Regarding: Pt called states she has pain in her lower back/Hip wld like to speak with someone regarding a Rx for the Pain  Contact: [phone]  Name of Who is Calling:KERMIT LICONA [[id number]]  What is the request in detail:Pt called states she has pain in her lower back/Hip wld like to speak with someone regarding a Rx for the Pain. Please advise   Can the clinic reply by [facility]:no  What Number to Call Back if not in [facility]: Telephone Information:Mobile          [phone]

## 2025-05-26 NOTE — ED NOTES
Patient identifiers for Brittany Modi 69 y.o. female checked and correct.  Chief Complaint   Patient presents with    Back Pain     Lower back pain and going down to both ankles, denies bowel/bladder incontinence      Past Medical History:   Diagnosis Date    Arthritis     Chest pain     GERD (gastroesophageal reflux disease)      Allergies reported: Review of patient's allergies indicates:  No Known Allergies    Appearance: Pt awake, alert & oriented to person, place & time. Pt in no acute distress at present time. Pt is clean and well groomed with clothes appropriately fastened.   Skin: Skin warm, dry & intact. Color consistent with ethnicity. Mucous membranes moist. No breakdown or brusing noted.   Musculoskeletal: Patient needs assistance with walking and transferring from wheel chair to bed. She has minimal tremors in bilateral arms. Patient endorses severe lower back pains.  Respiratory: Respirations spontaneous, even, and non-labored. Visible chest rise noted. Airway is open and patent. No accessory muscle use noted.   Neurologic: Sensation is intact. Speech is clear and appropriate. Eyes open spontaneously, behavior appropriate to situation, follows commands, facial expression symmetrical, bilateral hand grasp equal and even, purposeful motor response noted.  Cardiac: All peripheral pulses present. No Bilateral lower extremity edema. Cap refill is <3 seconds.  Abdomen: Abdomen soft, non distended, non tender to palpation.   : Pt voids independently, denies dysuria, hematuria, frequency.

## 2025-05-27 NOTE — TELEPHONE ENCOUNTER
Patient was seen Mor [facility] ED for her back pain and bilateral sciatica on 5/25/25.   She was referred to [facility].   Also prescribed Robaxin, Medrol dose pack.     This message was handled on 5/23/25- patient was prescribed Gabapentin by Mercedes.

## 2025-05-28 ENCOUNTER — PATIENT OUTREACH (OUTPATIENT)
Dept: ADMINISTRATIVE | Facility: CLINIC | Age: 70
End: 2025-05-28
Payer: MEDICARE

## 2025-06-06 ENCOUNTER — DOCUMENTATION ONLY (OUTPATIENT)
Dept: REHABILITATION | Facility: HOSPITAL | Age: 70
End: 2025-06-06
Payer: MEDICARE

## 2025-06-07 ENCOUNTER — PATIENT MESSAGE (OUTPATIENT)
Dept: INTERNAL MEDICINE | Facility: CLINIC | Age: 70
End: 2025-06-07
Payer: MEDICARE

## 2025-06-09 ENCOUNTER — TELEPHONE (OUTPATIENT)
Dept: ADMINISTRATIVE | Facility: OTHER | Age: 70
End: 2025-06-09
Payer: MEDICARE

## 2025-06-10 ENCOUNTER — OFFICE VISIT (OUTPATIENT)
Dept: INTERNAL MEDICINE | Facility: CLINIC | Age: 70
End: 2025-06-10
Payer: MEDICARE

## 2025-06-10 VITALS
WEIGHT: 148.13 LBS | OXYGEN SATURATION: 98 % | HEIGHT: 65 IN | SYSTOLIC BLOOD PRESSURE: 104 MMHG | DIASTOLIC BLOOD PRESSURE: 82 MMHG | BODY MASS INDEX: 24.68 KG/M2 | HEART RATE: 98 BPM | RESPIRATION RATE: 18 BRPM | TEMPERATURE: 98 F

## 2025-06-10 DIAGNOSIS — M54.32 BILATERAL SCIATICA: Primary | ICD-10-CM

## 2025-06-10 DIAGNOSIS — M54.31 BILATERAL SCIATICA: Primary | ICD-10-CM

## 2025-06-10 PROCEDURE — 3079F DIAST BP 80-89 MM HG: CPT | Mod: CPTII,HCNC,S$GLB, | Performed by: FAMILY MEDICINE

## 2025-06-10 PROCEDURE — 1157F ADVNC CARE PLAN IN RCRD: CPT | Mod: CPTII,HCNC,S$GLB, | Performed by: FAMILY MEDICINE

## 2025-06-10 PROCEDURE — 1126F AMNT PAIN NOTED NONE PRSNT: CPT | Mod: CPTII,HCNC,S$GLB, | Performed by: FAMILY MEDICINE

## 2025-06-10 PROCEDURE — 1101F PT FALLS ASSESS-DOCD LE1/YR: CPT | Mod: CPTII,HCNC,S$GLB, | Performed by: FAMILY MEDICINE

## 2025-06-10 PROCEDURE — 3008F BODY MASS INDEX DOCD: CPT | Mod: CPTII,HCNC,S$GLB, | Performed by: FAMILY MEDICINE

## 2025-06-10 PROCEDURE — 99999 PR PBB SHADOW E&M-EST. PATIENT-LVL IV: CPT | Mod: PBBFAC,HCNC,, | Performed by: FAMILY MEDICINE

## 2025-06-10 PROCEDURE — 1159F MED LIST DOCD IN RCRD: CPT | Mod: CPTII,HCNC,S$GLB, | Performed by: FAMILY MEDICINE

## 2025-06-10 PROCEDURE — 3074F SYST BP LT 130 MM HG: CPT | Mod: CPTII,HCNC,S$GLB, | Performed by: FAMILY MEDICINE

## 2025-06-10 PROCEDURE — 99214 OFFICE O/P EST MOD 30 MIN: CPT | Mod: HCNC,S$GLB,, | Performed by: FAMILY MEDICINE

## 2025-06-10 PROCEDURE — 3288F FALL RISK ASSESSMENT DOCD: CPT | Mod: CPTII,HCNC,S$GLB, | Performed by: FAMILY MEDICINE

## 2025-06-10 PROCEDURE — 1160F RVW MEDS BY RX/DR IN RCRD: CPT | Mod: CPTII,HCNC,S$GLB, | Performed by: FAMILY MEDICINE

## 2025-06-10 RX ORDER — TIZANIDINE 4 MG/1
4 TABLET ORAL EVERY 6 HOURS PRN
Qty: 40 TABLET | Refills: 0 | Status: SHIPPED | OUTPATIENT
Start: 2025-06-10

## 2025-06-10 NOTE — PROGRESS NOTES
Subjective     Patient ID: Brittany Modi is a 69 y.o. female.    Chief Complaint: Follow-up (ED; back pain radiate down legs)  69-year-old  female with Parkinson's disease and arthritis presents to clinic today for an emergency room follow-up on May 25, 2025 secondary to lower back pain.  The patient complained of lower back pain with radiation to the bilateral buttocks which worsened with prolonged sitting.  She reported a sensation of her legs locking up.  She did obtain symptom relief after being given morphine.  MRI was performed and revealed multilevel degenerative changes with spinal canal stenosis at L4/5 and moderate bilateral neural foraminal narrowing at L4/5 and L5/S1.  She was also provided Flexeril and Robaxin but reports not needing any since discharge.  Follow-up  Associated symptoms include fatigue. Pertinent negatives include no abdominal pain, chest pain, chills, congestion, coughing, fever, headaches, myalgias, nausea, neck pain, rash, sore throat or vomiting.     Review of Systems   Constitutional:  Positive for fatigue. Negative for appetite change, chills and fever.   HENT:  Negative for nasal congestion, ear pain, hearing loss, postnasal drip, rhinorrhea, sinus pressure/congestion, sore throat and tinnitus.    Eyes:  Negative for redness, itching and visual disturbance.   Respiratory:  Negative for cough, chest tightness and shortness of breath.    Cardiovascular:  Negative for chest pain and palpitations.   Gastrointestinal:  Negative for abdominal pain, constipation, diarrhea, nausea and vomiting.   Genitourinary:  Negative for decreased urine volume, difficulty urinating, dysuria, frequency, hematuria and urgency.   Musculoskeletal:  Positive for back pain. Negative for myalgias, neck pain and neck stiffness.   Integumentary:  Negative for rash.   Neurological:  Negative for dizziness, light-headedness and headaches.   Psychiatric/Behavioral: Negative.             Objective     Physical Exam  Vitals and nursing note reviewed.   Constitutional:       General: She is not in acute distress.     Appearance: She is well-developed. She is not diaphoretic.   HENT:      Head: Normocephalic and atraumatic.      Right Ear: External ear normal.      Left Ear: External ear normal.      Nose: Nose normal.      Mouth/Throat:      Pharynx: No oropharyngeal exudate.   Eyes:      General: No scleral icterus.        Right eye: No discharge.         Left eye: No discharge.      Conjunctiva/sclera: Conjunctivae normal.      Pupils: Pupils are equal, round, and reactive to light.   Neck:      Thyroid: No thyromegaly.      Vascular: No JVD.      Trachea: No tracheal deviation.   Cardiovascular:      Rate and Rhythm: Normal rate and regular rhythm.      Heart sounds: Normal heart sounds. No murmur heard.     No friction rub. No gallop.   Pulmonary:      Effort: Pulmonary effort is normal. No respiratory distress.      Breath sounds: Normal breath sounds. No stridor. No wheezing or rales.   Abdominal:      General: Bowel sounds are normal. There is no distension.      Palpations: Abdomen is soft. There is no mass.      Tenderness: There is no abdominal tenderness. There is no guarding or rebound.   Musculoskeletal:         General: No tenderness. Normal range of motion.      Cervical back: Normal range of motion and neck supple.   Lymphadenopathy:      Cervical: No cervical adenopathy.   Skin:     General: Skin is warm and dry.      Coloration: Skin is not pale.      Findings: No erythema or rash.   Neurological:      Mental Status: She is alert and oriented to person, place, and time.   Psychiatric:         Behavior: Behavior normal.         Thought Content: Thought content normal.         Judgment: Judgment normal.            Assessment and Plan     1. Bilateral sciatica  -     Ambulatory Referral/Consult to Physical Therapy  -     tiZANidine (ZANAFLEX) 4 MG tablet; Take 1 tablet (4 mg total) by  mouth every 6 (six) hours as needed (Muscle strain).  Dispense: 40 tablet; Refill: 0        1. Refer to physical therapy for further evaluation and treatment of bilateral sciatica with the associated lumbar degenerative joint disease with canal and neural foraminal stenosis.  I have provided the patient a prescription of tizanidine 4 mg to use as needed for muscle strain or pain.  2. Return to clinic as needed if symptoms persist or worsen.               Follow up if symptoms worsen or fail to improve.       No

## 2025-06-19 ENCOUNTER — PATIENT MESSAGE (OUTPATIENT)
Dept: REHABILITATION | Facility: HOSPITAL | Age: 70
End: 2025-06-19
Payer: MEDICARE

## 2025-06-19 ENCOUNTER — PATIENT MESSAGE (OUTPATIENT)
Dept: INTERNAL MEDICINE | Facility: CLINIC | Age: 70
End: 2025-06-19
Payer: MEDICARE

## 2025-06-19 ENCOUNTER — DOCUMENTATION ONLY (OUTPATIENT)
Dept: REHABILITATION | Facility: HOSPITAL | Age: 70
End: 2025-06-19
Payer: MEDICARE

## 2025-06-19 DIAGNOSIS — Z12.31 ENCOUNTER FOR SCREENING MAMMOGRAM FOR MALIGNANT NEOPLASM OF BREAST: Primary | ICD-10-CM

## 2025-06-19 NOTE — PROGRESS NOTES
Ms. Xenia Modi reached out to PT via portal to inform that she does not have transportation for PT appointments. She is agreeable to formal discharge from neuro PT. No goals achieved, plan resolved.     Patient is officially discharged from neuro PT.     Aniya Bowden, PT, DPT, NCS

## 2025-06-20 ENCOUNTER — TELEPHONE (OUTPATIENT)
Dept: ADMINISTRATIVE | Facility: OTHER | Age: 70
End: 2025-06-20
Payer: MEDICARE

## 2025-06-20 ENCOUNTER — TELEPHONE (OUTPATIENT)
Facility: CLINIC | Age: 70
End: 2025-06-20
Payer: MEDICARE

## 2025-06-20 NOTE — TELEPHONE ENCOUNTER
Copied from CRM #[id number]. Topic: Appointments - Amb Referral  >> Jun 20, 2025 11:44 AM Christin wrote:  Pt called in to schedule an appt; no available appts in Epic. Pt is asking for a call back soon to schedule. Thanks.     Reason appt not schedule: 3 month f/u / no appt avail         Patient requesting call back or [facility] msg: Call Back

## 2025-06-24 ENCOUNTER — OFFICE VISIT (OUTPATIENT)
Dept: PAIN MEDICINE | Facility: CLINIC | Age: 70
End: 2025-06-24
Payer: MEDICARE

## 2025-06-24 ENCOUNTER — TELEPHONE (OUTPATIENT)
Dept: ADMINISTRATIVE | Facility: OTHER | Age: 70
End: 2025-06-24
Payer: MEDICARE

## 2025-06-24 VITALS
HEART RATE: 71 BPM | BODY MASS INDEX: 28.79 KG/M2 | HEIGHT: 65 IN | WEIGHT: 172.81 LBS | SYSTOLIC BLOOD PRESSURE: 170 MMHG | DIASTOLIC BLOOD PRESSURE: 102 MMHG

## 2025-06-24 DIAGNOSIS — M47.816 LUMBAR SPONDYLOSIS: Primary | ICD-10-CM

## 2025-06-24 DIAGNOSIS — M54.16 LUMBAR RADICULOPATHY, CHRONIC: ICD-10-CM

## 2025-06-24 DIAGNOSIS — M54.41 ACUTE BILATERAL LOW BACK PAIN WITH BILATERAL SCIATICA: ICD-10-CM

## 2025-06-24 DIAGNOSIS — M54.42 ACUTE BILATERAL LOW BACK PAIN WITH BILATERAL SCIATICA: ICD-10-CM

## 2025-06-24 PROCEDURE — 99999 PR PBB SHADOW E&M-EST. PATIENT-LVL III: CPT | Mod: PBBFAC,,, | Performed by: STUDENT IN AN ORGANIZED HEALTH CARE EDUCATION/TRAINING PROGRAM

## 2025-06-24 PROCEDURE — 1159F MED LIST DOCD IN RCRD: CPT | Mod: CPTII,S$GLB,, | Performed by: STUDENT IN AN ORGANIZED HEALTH CARE EDUCATION/TRAINING PROGRAM

## 2025-06-24 PROCEDURE — 1157F ADVNC CARE PLAN IN RCRD: CPT | Mod: CPTII,S$GLB,, | Performed by: STUDENT IN AN ORGANIZED HEALTH CARE EDUCATION/TRAINING PROGRAM

## 2025-06-24 PROCEDURE — 1125F AMNT PAIN NOTED PAIN PRSNT: CPT | Mod: CPTII,S$GLB,, | Performed by: STUDENT IN AN ORGANIZED HEALTH CARE EDUCATION/TRAINING PROGRAM

## 2025-06-24 PROCEDURE — 1101F PT FALLS ASSESS-DOCD LE1/YR: CPT | Mod: CPTII,S$GLB,, | Performed by: STUDENT IN AN ORGANIZED HEALTH CARE EDUCATION/TRAINING PROGRAM

## 2025-06-24 PROCEDURE — 3077F SYST BP >= 140 MM HG: CPT | Mod: CPTII,S$GLB,, | Performed by: STUDENT IN AN ORGANIZED HEALTH CARE EDUCATION/TRAINING PROGRAM

## 2025-06-24 PROCEDURE — 99204 OFFICE O/P NEW MOD 45 MIN: CPT | Mod: S$GLB,,, | Performed by: STUDENT IN AN ORGANIZED HEALTH CARE EDUCATION/TRAINING PROGRAM

## 2025-06-24 PROCEDURE — 3288F FALL RISK ASSESSMENT DOCD: CPT | Mod: CPTII,S$GLB,, | Performed by: STUDENT IN AN ORGANIZED HEALTH CARE EDUCATION/TRAINING PROGRAM

## 2025-06-24 PROCEDURE — 3008F BODY MASS INDEX DOCD: CPT | Mod: CPTII,S$GLB,, | Performed by: STUDENT IN AN ORGANIZED HEALTH CARE EDUCATION/TRAINING PROGRAM

## 2025-06-24 PROCEDURE — 3080F DIAST BP >= 90 MM HG: CPT | Mod: CPTII,S$GLB,, | Performed by: STUDENT IN AN ORGANIZED HEALTH CARE EDUCATION/TRAINING PROGRAM

## 2025-06-24 NOTE — PROGRESS NOTES
[facility]    Referred by: Mercedes Hameed   Reason for referral: Lumbar radiculopathy, chronic     CC:   Chief Complaint   Patient presents with    Low-back Pain    Leg Pain    Hip Pain         6/24/2025     9:26 AM   Last 3 PDI Scores   Pain Disability Index (PDI) 35       Subjective 06/24/2025:   Brittany Modi is a 69 y.o. female who presents for evaluation of 2 episodes of low back pain with bilateral sciatica.  Currently she has no pain.  First episode was in October, 2nd episode was about 1 month ago.  No inciting incident.  Patient states she has been sitting for quite some time when she got up and noted significant pain in the low back radiating down both legs.  Both episodes self resolved.  An MRI did show moderate central canal stenosis and moderate bilateral for neuroforaminal narrowing in the lower aspect of the lumbar spine.  Today she rates her pain 0/10.    Initial Pain Assessment:  Location: lower back   Onset (Approx Date Pain started): eight months ago  Current Pain Score: 0/10 - no pain; just stiffness  Daily Pain of Range: 0-10/10  Quality: Sharp and stabbing  Radiation: bilateral hip area and lower extremities  Worsened by: sitting  Improved by: heat and medications     Patient denies significant motor weakness.      Previous Interventions:  - none    Previous Therapies:  PT/OT: no   Chiropractor:   HEP:  Yes-water aerobics 3-4 times weekly  Relevant Surgery: no     Previous Medications:   - Tylenol or NSAIDS:  Naprosyn, Tylenol  - Muscle Relaxants: Tizanidine, Robaxin  - TCAs:   - SNRIs:   - Topicals:   - Anticonvulsants: Gabapentin   - Opioids:   - Adjuvants:     Current Pain Medications:  No medications currently, no pain currently     Anticoagulation:     Review of Systems:  ROS    GENERAL:  No weight loss, malaise or fevers.  HEENT:   No recent changes in vision or hearing  NECK:  No difficulty with swallowing. No stridor.    RESPIRATORY:  Negative for cough, wheezing or shortness of breath, patient denies any recent URI.  CARDIOVASCULAR:  Negative for chest pain, leg swelling or palpitations.  GI:  Negative for abdominal discomfort, blood in stools or black stools or change in bowel habits.  MUSCULOSKELETAL:  See HPI.  SKIN:  Negative for lesions, rash, and itching.  PSYCH:  No mood disorder or recent psychosocial stressors.    HEMATOLOGY/LYMPHOLOGY:  Negative for prolonged bleeding, bruising easily or swollen nodes.  Patient is not currently taking any anti-coagulants  NEURO:   No history of headaches, syncope, paralysis, seizures or tremors.  All other reviewed and negative other than HPI.    History:  Current medications, allergies, medical history, surgical history,   family history, and social history were reviewed in the chart as marked.    Full Medication List:  Current Medications[1]     Allergies:  Patient has no known allergies.     Medical History:   has a past medical history of Arthritis, Chest pain, and GERD (gastroesophageal reflux disease).    Surgical History:   has a past surgical history that includes Appendectomy (2010); Tubal ligation; Abdominoplasty; Knee surgery (Right, 12/2015); Colonoscopy (N/A, 08/22/2019); Laparoscopic total hysterectomy (N/A, 12/05/2019); Cystoscopy (N/A, 12/05/2019); and Bilateral salpingo-oophorectomy (BSO) (12/05/2019).    Family History:  family history includes Asthma in her father; Breast cancer in her sister; Cancer in her sister; Diabetes in her mother; Hypertension in her brother, brother, mother, sister, sister, sister, and son; Hypothyroidism in her mother.    Social History:   reports that she has never smoked. She has never been exposed to tobacco smoke. She has never used smokeless tobacco. She reports that she does not drink alcohol and does not use drugs.    Physical Exam:  Vitals:    06/24/25 0918   BP: (!) 170/102   Pulse: 71   Weight: 78.4 kg (172 lb 13.5 oz)   Height: 5'  "5" (1.651 m)   PainSc:   4   PainLoc: Back       GENERAL: Well appearing, in no acute distress, alert and oriented x3.  PSYCH:  Mood and affect appropriate.  SKIN: Skin color, texture, turgor normal, no rashes or lesions.  HEAD/FACE:  Normocephalic, atraumatic. Cranial nerves grossly intact.  NECK: Normal ROM. Supple.   CV: RRR with palpation of the radial artery.  PULM: No evidence of respiratory difficulty, symmetric chest rise.  GI:  Soft and non-distended.  MSK: Straight leg raising is  negative to radicular pain. No pain to palpation over the facet joints of the lumbar spine. No pain with lumbar facet loading. No pain over the SI joints. Sacral Thrust is negative.  No pain over the GTB bilaterally.  Normal range of motion of the lumbar spine without pain reproduction.  Peripheral joint ROM is full and pain free without obvious instability or laxity in all four extremities. No obvious deformities, edema, or skin discoloration.  No atrophy or tone abnormalities are noted.   NEURO: Bilateral upper and lower extremity coordination and strength is symmetric.  No loss of sensation is noted. No clonus. Esquivel negative.  MENTAL STATUS: A x O x 3, good concentration, speech is fluent and goal directed  MOTOR: 5/5 in all  muscle groups  GAIT: Brandi l. Ambulates unassisted.    Imaging:  LUMBAR MRI  Results for orders placed during the hospital encounter of 05/25/25    MRI Lumbar Spine Without Contrast    Narrative  EXAMINATION:  MRI LUMBAR SPINE WITHOUT CONTRAST    CLINICAL HISTORY:  Lumbar radiculopathy, no red flags, no prior management    TECHNIQUE:  Multiplanar, multisequence MR images were acquired from the thoracolumbar junction to the sacrum without contrast.    COMPARISON:  None.    FINDINGS:  Alignment: Grade 1 anterolisthesis of L4 on L5.    Vertebrae: No fracture.  Bilateral pedicle, facet and spinous process edema at L4 and L5.    Discs: Disc heights are maintained.    Cord: Conus terminates at L1 and appears " unremarkable.  Cauda equina appears unremarkable.    Degenerative findings:    T12-L1: No spinal canal stenosis or neuroforaminal narrowing.    L1-L2: No spinal canal stenosis or neuroforaminal narrowing.    L2-L3: Mild bilateral facet arthropathy and ligamentum flavum hypertrophy contribute to mild spinal canal stenosis.  No significant neural foraminal narrowing.    L3-L4: Circumferential disc bulge, moderate bilateral facet arthropathy and ligamentum flavum hypertrophy contribute to moderate spinal canal stenosis, mild bilateral neural foraminal narrowing.    L4-L5: Circumferential disc bulge, moderate bilateral facet arthropathy and ligamentum flavum hypertrophy contribute to moderate spinal canal stenosis and moderate bilateral neural foraminal narrowing.  Bilateral facet edema.    L5-S1: Circumferential disc bulge, moderate bilateral facet arthropathy and ligamentum flavum hypertrophy contribute to moderate bilateral neural foraminal narrowing.  Bilateral facet edema.  No significant spinal canal stenosis.    Paraspinal muscles & soft tissues: Soft tissue edema about the posterior elements of L4 and L5.    Impression  Multilevel degenerative changes of the spine notable for moderate spinal canal stenosis at L4-L5 and moderate bilateral neural foraminal narrowing at L4-L5 and L5-S1.    Bilateral facet and spinous process edema at L4-L5 which could reflect Baastrup's disease.    Electronically signed by resident: Tej Abel  Date:    05/25/2025  Time:    22:40    Electronically signed by: Jose Hdez MD  Date:    05/25/2025  Time:    23:05      Labs:  BMP  Lab Results   Component Value Date     05/26/2025    K 3.7 05/26/2025     05/26/2025    CO2 21 (L) 05/26/2025    BUN 20 05/26/2025    CREATININE 1.1 05/26/2025    CALCIUM 9.4 05/26/2025    ANIONGAP 12 05/26/2025    EGFRNORACEVR 55 (L) 05/26/2025     Lab Results   Component Value Date    ALT 5 (L) 05/25/2025    AST 26 05/25/2025    ALKPHOS  63 05/25/2025    BILITOT 0.6 05/25/2025     Lab Results   Component Value Date    WBC 8.08 05/26/2025    HGB 11.4 (L) 05/26/2025    HCT 37.2 05/26/2025     (H) 05/26/2025     05/26/2025           Assessment:  Problem List Items Addressed This Visit          Orthopedic    Back pain     Other Visit Diagnoses         Lumbar spondylosis    -  Primary      Lumbar radiculopathy, chronic                06/24/2025 - Brittany Modi is a 69 y.o. female who  has a past medical history of Arthritis, Chest pain, and GERD (gastroesophageal reflux disease).  By history and examination this patient has low back pain with bilateral radicular symptoms, which has since resolved.  The underlying cause is facet arthritis, degenerative disc disease, foraminal stenosis, and central canal stenosis.  Pathology is confirmed by imaging.  We discussed the underlying diagnoses and multiple treatment options including non-opioid medications, interventional procedures, physical therapy, home exercise, and core muscle enhancement.  The risks and benefits of each treatment option were discussed and all questions were answered.      Treatment Plan:  None indicated at this time.  Could consider an epidural steroid injection if her pain returns and does not improve with conservative measures.  Procedures:   PT/OT/HEP: I have stressed the importance of physical activity and a home exercise plan to help with pain and improve health.  Continue with home exercise and aquatic aerobics as tolerated.  I did provide her with the [facility] spine conditioning program as well.  Medications:    - she may continue with PRN Tylenol, Aleve, Robaxin   - gabapentin per Neurology   -  Reviewed and consistent with medication use as prescribed.  Imaging:  Lumbar MRI was reviewed with the patient and discuss detail.  Follow Up:  As needed.    Jovanna Meyers DO   Interventional Pain Medicine / Anesthesiology    Disclaimer: This note was partly generated  using dictation software which may occasionally result in transcription errors.         [1]   Current Outpatient Medications:     carbidopa-levodopa  mg (SINEMET)  mg per tablet, Take 1 tablet by mouth 3 (three) times daily., Disp: 270 tablet, Rfl: 3    gabapentin (NEURONTIN) 100 MG capsule, Take 1 capsule (100 mg total) by mouth 3 (three) times daily. (Patient not taking: Reported on 6/24/2025), Disp: 90 capsule, Rfl: 0    gabapentin (NEURONTIN) 300 MG capsule, Take 1 capsule (300 mg total) by mouth 3 (three) times daily., Disp: 90 capsule, Rfl: 11    magnesium hydroxide 400 mg/5 ml (MILK OF MAGNESIA) 400 mg/5 mL Susp, Take 30 mLs by mouth daily as needed. (Patient not taking: Reported on 6/10/2025), Disp: , Rfl:     rOPINIRole (REQUIP) 0.25 MG tablet, Take 1 tablet (0.25 mg total) by mouth 2 (two) times a day., Disp: 60 tablet, Rfl: 11    tiZANidine (ZANAFLEX) 4 MG tablet, Take 1 tablet (4 mg total) by mouth every 6 (six) hours as needed (Muscle strain). (Patient not taking: Reported on 6/24/2025), Disp: 40 tablet, Rfl: 0

## 2025-06-27 ENCOUNTER — PATIENT MESSAGE (OUTPATIENT)
Facility: CLINIC | Age: 70
End: 2025-06-27
Payer: MEDICARE

## 2025-06-27 DIAGNOSIS — R25.1 TREMOR: ICD-10-CM

## 2025-06-27 RX ORDER — CARBIDOPA AND LEVODOPA 25; 100 MG/1; MG/1
1 TABLET ORAL 3 TIMES DAILY
Qty: 270 TABLET | Refills: 3 | OUTPATIENT
Start: 2025-06-27

## 2025-06-27 RX ORDER — CARBIDOPA AND LEVODOPA 25; 100 MG/1; MG/1
1 TABLET ORAL 3 TIMES DAILY
Qty: 270 TABLET | Refills: 3 | Status: SHIPPED | OUTPATIENT
Start: 2025-06-27

## 2025-07-09 NOTE — PROGRESS NOTES
DATE: 7/29/2025  PATIENT: Brittany Modi    Supervising Physician: Moustapha Quintana M.D.    CHIEF COMPLAINT: neck and back pain    HISTORY:  Brittany Modi is a 70 y.o. female here for initial evaluation of low back pain and intermittent leg pain (Back - 5, Leg - 0). The pain has been present for 3 months. The patient describes the pain as stabbing.  The pain is worse with walking and improved by nothing. There is no associated numbness and tingling. There is no subjective weakness.   The patient also has complaints of neck pain (Neck - 5, Arm - 0).  The pain has been present for about 3 months. The patient describes the pain as stiffness. The pain is worse with neck rotation and improved by heat. There is no associated numbness and tingling. There is no subjective weakness. Prior treatments have included robaxin, gabapentin, but no PT, YUSEF or surgery.   The patient denies myelopathic symptoms such as handwriting changes or difficulty with buttons/coins/keys. Denies perineal paresthesias, bowel/bladder dysfunction.    PAST MEDICAL/SURGICAL HISTORY:  Past Medical History:   Diagnosis Date    Arthritis     Chest pain     GERD (gastroesophageal reflux disease)      Past Surgical History:   Procedure Laterality Date    ABDOMINOPLASTY      APPENDECTOMY  2010    BILATERAL SALPINGO-OOPHORECTOMY (BSO)  12/05/2019    Surgeon: Geri Azar MD -- [facility]    COLONOSCOPY N/A 08/22/2019    Procedure: COLONOSCOPY;  Surgeon: Rose Mary Ervin MD;  Location: [address]);  Service: Endoscopy;  Laterality: N/A;  no PM prep    CYSTOSCOPY N/A 12/05/2019    Surgeon: Geri Azar MD -- [facility]    KNEE SURGERY Right 12/2015    TKR    LAPAROSCOPIC TOTAL HYSTERECTOMY N/A 12/05/2019    Surgeon: Geri Azar MD -- [facility]    TUBAL LIGATION      age 30's       Medications:   Medications Ordered Prior to Encounter[1]    Social History: Social History[2]    REVIEW OF SYSTEMS:  Constitution: Negative. Negative  for chills, fever and night sweats.   Cardiovascular: Negative for chest pain and syncope.   Respiratory: Negative for cough and shortness of breath.   Gastrointestinal: See HPI. Negative for nausea/vomiting. Negative for abdominal pain.  Genitourinary: See HPI. Negative for discoloration or dysuria.  Skin: Negative for dry skin, itching and rash.   Hematologic/Lymphatic: Negative for bleeding problem. Does not bruise/bleed easily.   Musculoskeletal: Negative for falls and muscle weakness.   Neurological: See HPI. No seizures.   Endocrine: Negative for polydipsia, polyphagia and polyuria.   Allergic/Immunologic: Negative for hives and persistent infections.     EXAM:  Ht 5' (1.524 m)   Wt 78.3 kg (172 lb 9.9 oz)   BMI 33.71 kg/m²     PHYSICAL EXAMINATION:    General: The patient is a 70 y.o. female in no apparent distress, the patient is oriented to person, place and time.  Psych: Normal mood and affect  HEENT: Vision grossly intact, hearing intact to the spoken word.  Lungs: Respirations unlabored.  Gait: Normal station and gait, no difficulty with toe or heel walk.   Skin: Cervical skin and dorsal lumbar skin negative for rashes, lesions, hairy patches and surgical scars.    Range of motion: Cervical and lumbar range of motion is acceptable. There is mild tenderness to palpation of the paracervical muscles.  There is mild lumbar tenderness to palpation.  Spinal Balance: Global saggital and coronal spinal balance acceptable, no significant for scoliosis and kyphosis.  Musculoskeletal: No pain with the range of motion of the bilateral shoulders and elbows. Normal bulk and contour of the bilateral hands.  No pain with the range of motion of the bilateral hips. Mild bilateral trochanteric tenderness to palpation.  Vascular: Bilateral upper and lower extremities warm and well perfused, radial pulses 2+ bilaterally, dorsalis pedis pulses 2+ bilaterally.  Neurological: Normal strength and tone in all major motor groups  in the bilateral upper and lower extremities. Normal sensation to light touch in the C5-T1 and L2-S1 dermatomes bilaterally.  Deep tendon reflexes symmetric 2+ in the bilateral upper and lower extremities.  Negative Inverted Radial Reflex and Esquivel's bilaterally. Negative Babinski bilaterally. Negative straight leg raise bilaterally.     IMAGING:   Today I personally reviewed AP, Lat and Flex/Ex  upright C-spine films that demonstrate mild DDD.    Body mass index is 33.71 kg/m².    Hemoglobin A1C   Date Value Ref Range Status   07/19/2019 4.9 4.0 - 5.6 % Final     Comment:     ADA Screening Guidelines:  5.7-6.4%  Consistent with prediabetes  >or=6.5%  Consistent with diabetes  High levels of fetal hemoglobin interfere with the HbA1C  assay. Heterozygous hemoglobin variants (HbS, HgC, etc)do  not significantly interfere with this assay.   However, presence of multiple variants may affect accuracy.     05/16/2018 5.0 4.0 - 5.6 % Final     Comment:     According to ADA guidelines, hemoglobin A1c <7.0% represents  optimal control in non-pregnant diabetic patients. Different  metrics may apply to specific patient populations.   Standards of Medical Care in Diabetes-2016.  For the purpose of screening for the presence of diabetes:  <5.7%     Consistent with the absence of diabetes  5.7-6.4%  Consistent with increasing risk for diabetes   (prediabetes)  >or=6.5%  Consistent with diabetes  Currently, no consensus exists for use of hemoglobin A1c  for diagnosis of diabetes for children.  This Hemoglobin A1c assay has significant interference with fetal   hemoglobin   (HbF). The results are invalid for patients with abnormal amounts of   HbF,   including those with known Hereditary Persistence   of Fetal Hemoglobin. Heterozygous hemoglobin variants (HbAS, HbAC,   HbAD, HbAE, HbA2) do not significantly interfere with this assay;   however, presence of multiple variants in a sample may impact the %   interference.    "  01/29/2018 4.8 4.0 - 5.6 % Final     Comment:     According to ADA guidelines, hemoglobin A1c <7.0% represents  optimal control in non-pregnant diabetic patients. Different  metrics may apply to specific patient populations.   Standards of Medical Care in Diabetes-2016.  For the purpose of screening for the presence of diabetes:  <5.7%     Consistent with the absence of diabetes  5.7-6.4%  Consistent with increasing risk for diabetes   (prediabetes)  >or=6.5%  Consistent with diabetes  Currently, no consensus exists for use of hemoglobin A1c  for diagnosis of diabetes for children.  This Hemoglobin A1c assay has significant interference with fetal   hemoglobin   (HbF). The results are invalid for patients with abnormal amounts of   HbF,   including those with known Hereditary Persistence   of Fetal Hemoglobin. Heterozygous hemoglobin variants (HbAS, HbAC,   HbAD, HbAE, HbA2) do not significantly interfere with this assay;   however, presence of multiple variants in a sample may impact the %   interference.           ASSESSMENT/PLAN:    Brittany Martin" was seen today for low-back pain.    Diagnoses and all orders for this visit:    DDD (degenerative disc disease), cervical  -     Ambulatory referral/consult to [facility]; Future  -     MRI Cervical Spine Without Contrast; Future    Back pain  -     Ambulatory referral/consult to [facility]  -     Ambulatory referral/consult to [facility]; Future    Bilateral sciatica  -     Ambulatory referral/consult to [facility]        Today we discussed at length all of the different treatment options including anti-inflammatories, acetaminophen, rest, ice, heat, physical therapy including strengthening and stretching exercises, home exercises, ROM, aerobic conditioning, aqua therapy, other modalities including ultrasound, massage, and dry needling, epidural steroid injections and finally surgical intervention.    Referral to healthy back program " placed  Cervical MRI ordered  Follow up in 2 months         [1]   Current Outpatient Medications on File Prior to Visit   Medication Sig Dispense Refill    carbidopa-levodopa  mg (SINEMET)  mg per tablet Take 1 tablet by mouth 3 (three) times daily. 270 tablet 3    gabapentin (NEURONTIN) 300 MG capsule Take 1 capsule (300 mg total) by mouth 3 (three) times daily. 90 capsule 11    methocarbamoL (ROBAXIN) 500 MG Tab Take 1 tablet (500 mg total) by mouth 3 (three) times daily as needed (muscle spasms). 270 tablet 3    rOPINIRole (REQUIP) 0.25 MG tablet Take 1 tablet (0.25 mg total) by mouth 2 (two) times a day. 90 tablet 0    magnesium hydroxide 400 mg/5 ml (MILK OF MAGNESIA) 400 mg/5 mL Susp Take 30 mLs by mouth daily as needed. (Patient not taking: Reported on 6/10/2025)       No current facility-administered medications on file prior to visit.   [2]   Social History  Socioeconomic History    Marital status:     Number of children: 2   Occupational History    Occupation: RN   Tobacco Use    Smoking status: Never     Passive exposure: Never    Smokeless tobacco: Never    Tobacco comments:     RN at [employer]   Substance and Sexual Activity    Alcohol use: No    Drug use: No    Sexual activity: Yes     Partners: Male     Birth control/protection: None   Social History Narrative    ** Merged History Encounter **          Social Drivers of Health     Financial Resource Strain: Low Risk  (4/26/2024)    Overall Financial Resource Strain (CARDIA)     Difficulty of Paying Living Expenses: Not very hard   Food Insecurity: No Food Insecurity (4/26/2024)    Hunger Vital Sign     Worried About Running Out of Food in the Last Year: Never true     Ran Out of Food in the Last Year: Never true   Transportation Needs: No Transportation Needs (4/26/2024)    PRAPARE - Transportation     Lack of Transportation (Medical): No     Lack of Transportation (Non-Medical): No   Physical Activity: Insufficiently Active (4/26/2024)     Exercise Vital Sign     Days of Exercise per Week: 3 days     Minutes of Exercise per Session: 40 min   Stress: Stress Concern Present (4/26/2024)    [employer] [place] [employer] - Occupational Stress Questionnaire     Feeling of Stress : To some extent   Housing Stability: Unknown (4/26/2024)    Housing Stability Vital Sign     Unable to Pay for Housing in the Last Year: No

## 2025-07-15 DIAGNOSIS — M51.362 DEGENERATION OF INTERVERTEBRAL DISC OF LUMBAR REGION WITH DISCOGENIC BACK PAIN AND LOWER EXTREMITY PAIN: Primary | ICD-10-CM

## 2025-07-16 DIAGNOSIS — R25.1 TREMOR: ICD-10-CM

## 2025-07-17 RX ORDER — ROPINIROLE 0.25 MG/1
0.25 TABLET, FILM COATED ORAL 2 TIMES DAILY
Qty: 90 TABLET | Refills: 0 | Status: SHIPPED | OUTPATIENT
Start: 2025-07-17

## 2025-07-17 RX ORDER — METHOCARBAMOL 500 MG/1
TABLET, FILM COATED ORAL
Qty: 270 TABLET | Refills: 0 | OUTPATIENT
Start: 2025-07-17

## 2025-07-17 RX ORDER — CARBIDOPA AND LEVODOPA 25; 100 MG/1; MG/1
1 TABLET ORAL 3 TIMES DAILY
Qty: 270 TABLET | Refills: 3 | Status: SHIPPED | OUTPATIENT
Start: 2025-07-17

## 2025-07-23 ENCOUNTER — PATIENT MESSAGE (OUTPATIENT)
Facility: CLINIC | Age: 70
End: 2025-07-23
Payer: MEDICARE

## 2025-07-23 DIAGNOSIS — G20.B2 PARKINSON'S DISEASE WITH DYSKINESIA AND FLUCTUATING MANIFESTATIONS: Primary | ICD-10-CM

## 2025-07-24 ENCOUNTER — TELEPHONE (OUTPATIENT)
Dept: ORTHOPEDICS | Facility: CLINIC | Age: 70
End: 2025-07-24
Payer: MEDICARE

## 2025-07-24 NOTE — TELEPHONE ENCOUNTER
Copied from CRM #[id number]. Topic: General Inquiry - Patient Advice  >> Jul 24, 2025  2:34 PM Zee wrote:  Consult/Advisory    Name Of Caller:Xenia Modi        Contact Preference:[phone] (home) [phone] (work)     Nature of call:  Pt had a MRI done and is wondering if she stills needs a X-Ray for NP Solano please call. Pt would like a X-Ray ordered for Neck.  >> Jul 24, 2025  2:40 PM Med Assistant Casi wrote:

## 2025-07-25 RX ORDER — METHOCARBAMOL 500 MG/1
500 TABLET, FILM COATED ORAL 3 TIMES DAILY PRN
Qty: 270 TABLET | Refills: 3 | Status: SHIPPED | OUTPATIENT
Start: 2025-07-25

## 2025-07-28 ENCOUNTER — HOSPITAL ENCOUNTER (OUTPATIENT)
Dept: RADIOLOGY | Facility: HOSPITAL | Age: 70
Discharge: HOME | End: 2025-07-28
Attending: REGISTERED NURSE
Payer: MEDICARE

## 2025-07-28 DIAGNOSIS — M50.30 DDD (DEGENERATIVE DISC DISEASE), CERVICAL: ICD-10-CM

## 2025-07-28 DIAGNOSIS — M50.30 DDD (DEGENERATIVE DISC DISEASE), CERVICAL: Primary | ICD-10-CM

## 2025-07-28 PROCEDURE — 72050 X-RAY EXAM NECK SPINE 4/5VWS: CPT | Mod: 26,HCNC,, | Performed by: RADIOLOGY

## 2025-07-28 PROCEDURE — 72050 X-RAY EXAM NECK SPINE 4/5VWS: CPT | Mod: TC,HCNC,PO

## 2025-07-29 ENCOUNTER — OFFICE VISIT (OUTPATIENT)
Dept: ORTHOPEDICS | Facility: CLINIC | Age: 70
End: 2025-07-29
Payer: MEDICARE

## 2025-07-29 VITALS — BODY MASS INDEX: 33.89 KG/M2 | WEIGHT: 172.63 LBS | HEIGHT: 60 IN

## 2025-07-29 DIAGNOSIS — M50.30 DDD (DEGENERATIVE DISC DISEASE), CERVICAL: Primary | ICD-10-CM

## 2025-07-29 DIAGNOSIS — M54.32 BILATERAL SCIATICA: ICD-10-CM

## 2025-07-29 DIAGNOSIS — M54.31 BILATERAL SCIATICA: ICD-10-CM

## 2025-07-29 DIAGNOSIS — M54.9 BACK PAIN: ICD-10-CM

## 2025-07-29 PROCEDURE — 3288F FALL RISK ASSESSMENT DOCD: CPT | Mod: CPTII,S$GLB,, | Performed by: REGISTERED NURSE

## 2025-07-29 PROCEDURE — 1101F PT FALLS ASSESS-DOCD LE1/YR: CPT | Mod: CPTII,S$GLB,, | Performed by: REGISTERED NURSE

## 2025-07-29 PROCEDURE — 99213 OFFICE O/P EST LOW 20 MIN: CPT | Mod: S$GLB,,, | Performed by: REGISTERED NURSE

## 2025-07-29 PROCEDURE — 99999 PR PBB SHADOW E&M-EST. PATIENT-LVL III: CPT | Mod: PBBFAC,HCNC,, | Performed by: REGISTERED NURSE

## 2025-07-29 PROCEDURE — 1125F AMNT PAIN NOTED PAIN PRSNT: CPT | Mod: CPTII,S$GLB,, | Performed by: REGISTERED NURSE

## 2025-07-29 PROCEDURE — 3008F BODY MASS INDEX DOCD: CPT | Mod: CPTII,S$GLB,, | Performed by: REGISTERED NURSE

## 2025-07-29 PROCEDURE — 1159F MED LIST DOCD IN RCRD: CPT | Mod: CPTII,S$GLB,, | Performed by: REGISTERED NURSE

## 2025-07-29 PROCEDURE — 1157F ADVNC CARE PLAN IN RCRD: CPT | Mod: CPTII,S$GLB,, | Performed by: REGISTERED NURSE

## 2025-07-31 ENCOUNTER — PATIENT MESSAGE (OUTPATIENT)
Facility: CLINIC | Age: 70
End: 2025-07-31
Payer: MEDICARE

## 2025-08-04 ENCOUNTER — PATIENT MESSAGE (OUTPATIENT)
Dept: ORTHOPEDICS | Facility: CLINIC | Age: 70
End: 2025-08-04
Payer: MEDICARE

## 2025-08-19 ENCOUNTER — OFFICE VISIT (OUTPATIENT)
Dept: INTERNAL MEDICINE | Facility: CLINIC | Age: 70
End: 2025-08-19
Payer: MEDICARE

## 2025-08-19 ENCOUNTER — HOSPITAL ENCOUNTER (OUTPATIENT)
Dept: RADIOLOGY | Facility: HOSPITAL | Age: 70
Discharge: HOME | End: 2025-08-19
Attending: FAMILY MEDICINE
Payer: MEDICARE

## 2025-08-19 VITALS
BODY MASS INDEX: 28.66 KG/M2 | HEIGHT: 65 IN | HEART RATE: 70 BPM | RESPIRATION RATE: 18 BRPM | OXYGEN SATURATION: 98 % | DIASTOLIC BLOOD PRESSURE: 60 MMHG | SYSTOLIC BLOOD PRESSURE: 120 MMHG | WEIGHT: 172 LBS

## 2025-08-19 DIAGNOSIS — N18.31 STAGE 3A CHRONIC KIDNEY DISEASE: ICD-10-CM

## 2025-08-19 DIAGNOSIS — G20.C PARKINSONISM, UNSPECIFIED PARKINSONISM TYPE: ICD-10-CM

## 2025-08-19 DIAGNOSIS — M76.62 TENDONITIS, ACHILLES, LEFT: Primary | ICD-10-CM

## 2025-08-19 DIAGNOSIS — Z12.31 ENCOUNTER FOR SCREENING MAMMOGRAM FOR MALIGNANT NEOPLASM OF BREAST: ICD-10-CM

## 2025-08-19 DIAGNOSIS — R41.3 MEMORY CHANGES: ICD-10-CM

## 2025-08-19 PROCEDURE — 77067 SCR MAMMO BI INCL CAD: CPT | Mod: TC,HCNC,PO

## 2025-08-19 PROCEDURE — 99999 PR PBB SHADOW E&M-EST. PATIENT-LVL III: CPT | Mod: PBBFAC,HCNC,, | Performed by: NURSE PRACTITIONER

## 2025-08-19 RX ORDER — TRIAMCINOLONE ACETONIDE 40 MG/ML
40 INJECTION, SUSPENSION INTRA-ARTICULAR; INTRAMUSCULAR
Status: COMPLETED | OUTPATIENT
Start: 2025-08-19 | End: 2025-08-19

## 2025-08-19 RX ORDER — METHYLPREDNISOLONE 4 MG/1
TABLET ORAL
Qty: 21 EACH | Refills: 0 | Status: SHIPPED | OUTPATIENT
Start: 2025-08-19

## 2025-08-19 RX ADMIN — TRIAMCINOLONE ACETONIDE 40 MG: 40 INJECTION, SUSPENSION INTRA-ARTICULAR; INTRAMUSCULAR at 01:08

## 2025-08-22 ENCOUNTER — CLINICAL SUPPORT (OUTPATIENT)
Dept: REHABILITATION | Facility: HOSPITAL | Age: 70
End: 2025-08-22
Payer: MEDICARE

## 2025-08-22 DIAGNOSIS — R29.898 DECREASED STRENGTH OF TRUNK AND BACK: Primary | ICD-10-CM

## 2025-08-22 DIAGNOSIS — M25.69 DECREASED RANGE OF MOTION OF TRUNK AND BACK: ICD-10-CM

## 2025-08-22 PROCEDURE — 97110 THERAPEUTIC EXERCISES: CPT | Performed by: PHYSICAL MEDICINE & REHABILITATION

## 2025-08-22 PROCEDURE — 97162 PT EVAL MOD COMPLEX 30 MIN: CPT | Performed by: PHYSICAL MEDICINE & REHABILITATION

## 2025-08-29 RX ORDER — ROPINIROLE 0.25 MG/1
0.25 TABLET, FILM COATED ORAL 2 TIMES DAILY
Qty: 180 TABLET | Refills: 3 | Status: SHIPPED | OUTPATIENT
Start: 2025-08-29

## (undated) DEVICE — PAD ABD 8X10 STERILE

## (undated) DEVICE — GLOVE BIOGEL SKINSENSE PI 6.5

## (undated) DEVICE — SPONGE LAP 18X18 PREWASHED

## (undated) DEVICE — SEE MEDLINE ITEM 156930

## (undated) DEVICE — ELECTRODE NEEDLE 1IN

## (undated) DEVICE — KIT WING PAD POSITIONING

## (undated) DEVICE — TIP RUMI KOH-EFFICIENT 3.5

## (undated) DEVICE — SUT VICRYL PLUS ANTIBACT

## (undated) DEVICE — SOL 9P NACL IRR PIC IL

## (undated) DEVICE — SOL STRL WATER INJ 1000ML BG

## (undated) DEVICE — SEE MEDLINE ITEM 152622

## (undated) DEVICE — SUT VICRYL PLUS 0 CT1 36IN

## (undated) DEVICE — SOL CLEARIFY VISUALIZATION LAP

## (undated) DEVICE — SUT MCRYL PLUS 4-0 PS2 27IN

## (undated) DEVICE — SYR 10CC LUER LOCK

## (undated) DEVICE — ELECTRODE ELECSURG LAPSCP SPAT

## (undated) DEVICE — DRAPE STERI LONG

## (undated) DEVICE — PACK LAPAROSCOPY BAPTIST

## (undated) DEVICE — SEE MEDLINE ITEM 157110

## (undated) DEVICE — SEALER LIGASURE LAP 37CM 5MM

## (undated) DEVICE — SUT EASE CROSSBOW CLSR SYS

## (undated) DEVICE — CLOSURE SKIN STERI STRIP 1/2X4

## (undated) DEVICE — PORT ACCESS 8MM W/120MM LOW

## (undated) DEVICE — CANNULA ENDOPATH XCEL 5X100MM

## (undated) DEVICE — TIP RUMI KOH-EFFICIENT 4.0

## (undated) DEVICE — GLOVE BIOGEL SKINSENSE PI 7.0

## (undated) DEVICE — NDL INSUF ULTRA VERESS 120MM

## (undated) DEVICE — TROCAR ENDOPATH XCEL 5X100MM

## (undated) DEVICE — FOOTSWITCH SUCTION IRRIGATION

## (undated) DEVICE — TIP RUMI BLUE DISPOSABLE 5/BX

## (undated) DEVICE — ELECTRODE REM PLYHSV RETURN 9

## (undated) DEVICE — SET CYSTO IRRIGATION UNIV SPIK

## (undated) DEVICE — SCISSOR 5MMX35CM DIRECT DRIVE

## (undated) DEVICE — JELLY SURGILUBE 5GR

## (undated) DEVICE — SET TRI-LUMEN FILTERED TUBE

## (undated) DEVICE — IRRIGATOR ENDOSCOPY DISP.

## (undated) DEVICE — SUT VICRYL 2-0 36 CT-1

## (undated) DEVICE — SEE MEDLINE ITEM 153151